# Patient Record
Sex: FEMALE | Race: WHITE | Employment: UNEMPLOYED | URBAN - METROPOLITAN AREA
[De-identification: names, ages, dates, MRNs, and addresses within clinical notes are randomized per-mention and may not be internally consistent; named-entity substitution may affect disease eponyms.]

---

## 2018-05-26 PROBLEM — K52.9 COLITIS: Status: ACTIVE | Noted: 2018-05-26

## 2019-09-09 ENCOUNTER — HOSPITAL ENCOUNTER (EMERGENCY)
Age: 84
Discharge: HOME | End: 2019-09-09
Attending: EMERGENCY MEDICINE
Payer: MEDICARE

## 2019-09-09 ENCOUNTER — APPOINTMENT (OUTPATIENT)
Dept: CT IMAGING | Age: 84
End: 2019-09-09
Payer: MEDICARE

## 2019-09-09 VITALS
OXYGEN SATURATION: 95 % | WEIGHT: 128 LBS | HEART RATE: 70 BPM | TEMPERATURE: 97.8 F | DIASTOLIC BLOOD PRESSURE: 62 MMHG | HEIGHT: 60 IN | RESPIRATION RATE: 16 BRPM | BODY MASS INDEX: 25.13 KG/M2 | SYSTOLIC BLOOD PRESSURE: 145 MMHG

## 2019-09-09 DIAGNOSIS — R51.9 NONINTRACTABLE HEADACHE, UNSPECIFIED CHRONICITY PATTERN, UNSPECIFIED HEADACHE TYPE: Primary | ICD-10-CM

## 2019-09-09 DIAGNOSIS — N18.9 CHRONIC RENAL IMPAIRMENT, UNSPECIFIED CKD STAGE: ICD-10-CM

## 2019-09-09 LAB
A/G RATIO: 1.2 (ref 1.1–2.2)
ALBUMIN SERPL-MCNC: 3.8 G/DL (ref 3.4–5)
ALP BLD-CCNC: 76 U/L (ref 40–129)
ALT SERPL-CCNC: 22 U/L (ref 10–40)
ANION GAP SERPL CALCULATED.3IONS-SCNC: 12 MMOL/L (ref 3–16)
AST SERPL-CCNC: 24 U/L (ref 15–37)
BASOPHILS ABSOLUTE: 0.1 K/UL (ref 0–0.2)
BASOPHILS RELATIVE PERCENT: 0.6 %
BILIRUB SERPL-MCNC: 0.3 MG/DL (ref 0–1)
BILIRUBIN URINE: NEGATIVE
BLOOD, URINE: NEGATIVE
BUN BLDV-MCNC: 42 MG/DL (ref 7–20)
CALCIUM SERPL-MCNC: 8.6 MG/DL (ref 8.3–10.6)
CHLORIDE BLD-SCNC: 94 MMOL/L (ref 99–110)
CLARITY: CLEAR
CO2: 22 MMOL/L (ref 21–32)
COLOR: YELLOW
CREAT SERPL-MCNC: 1.4 MG/DL (ref 0.6–1.2)
EOSINOPHILS ABSOLUTE: 0.1 K/UL (ref 0–0.6)
EOSINOPHILS RELATIVE PERCENT: 0.6 %
EPITHELIAL CELLS, UA: NORMAL /HPF
GFR AFRICAN AMERICAN: 42
GFR NON-AFRICAN AMERICAN: 35
GLOBULIN: 3.2 G/DL
GLUCOSE BLD-MCNC: 122 MG/DL (ref 70–99)
GLUCOSE URINE: NEGATIVE MG/DL
HCT VFR BLD CALC: 35.5 % (ref 36–48)
HEMOGLOBIN: 12.1 G/DL (ref 12–16)
INR BLD: 0.99 (ref 0.86–1.14)
KETONES, URINE: NEGATIVE MG/DL
LEUKOCYTE ESTERASE, URINE: ABNORMAL
LYMPHOCYTES ABSOLUTE: 1.7 K/UL (ref 1–5.1)
LYMPHOCYTES RELATIVE PERCENT: 17.3 %
MCH RBC QN AUTO: 32 PG (ref 26–34)
MCHC RBC AUTO-ENTMCNC: 34 G/DL (ref 31–36)
MCV RBC AUTO: 94.2 FL (ref 80–100)
MICROSCOPIC EXAMINATION: YES
MONOCYTES ABSOLUTE: 0.4 K/UL (ref 0–1.3)
MONOCYTES RELATIVE PERCENT: 4.5 %
NEUTROPHILS ABSOLUTE: 7.4 K/UL (ref 1.7–7.7)
NEUTROPHILS RELATIVE PERCENT: 77 %
NITRITE, URINE: NEGATIVE
PDW BLD-RTO: 12.7 % (ref 12.4–15.4)
PH UA: 5.5 (ref 5–8)
PLATELET # BLD: 169 K/UL (ref 135–450)
PMV BLD AUTO: 8 FL (ref 5–10.5)
POTASSIUM SERPL-SCNC: 4.9 MMOL/L (ref 3.5–5.1)
PROTEIN UA: NEGATIVE MG/DL
PROTHROMBIN TIME: 11.3 SEC (ref 9.8–13)
RBC # BLD: 3.77 M/UL (ref 4–5.2)
RBC UA: NORMAL /HPF (ref 0–2)
SODIUM BLD-SCNC: 128 MMOL/L (ref 136–145)
SPECIFIC GRAVITY UA: 1.01 (ref 1–1.03)
TOTAL PROTEIN: 7 G/DL (ref 6.4–8.2)
URINE TYPE: ABNORMAL
UROBILINOGEN, URINE: 0.2 E.U./DL
WBC # BLD: 9.7 K/UL (ref 4–11)
WBC UA: NORMAL /HPF (ref 0–5)

## 2019-09-09 PROCEDURE — 81001 URINALYSIS AUTO W/SCOPE: CPT

## 2019-09-09 PROCEDURE — 6360000002 HC RX W HCPCS: Performed by: EMERGENCY MEDICINE

## 2019-09-09 PROCEDURE — 99284 EMERGENCY DEPT VISIT MOD MDM: CPT

## 2019-09-09 PROCEDURE — 96374 THER/PROPH/DIAG INJ IV PUSH: CPT

## 2019-09-09 PROCEDURE — 96361 HYDRATE IV INFUSION ADD-ON: CPT

## 2019-09-09 PROCEDURE — 80053 COMPREHEN METABOLIC PANEL: CPT

## 2019-09-09 PROCEDURE — 2580000003 HC RX 258: Performed by: EMERGENCY MEDICINE

## 2019-09-09 PROCEDURE — 96375 TX/PRO/DX INJ NEW DRUG ADDON: CPT

## 2019-09-09 PROCEDURE — 70450 CT HEAD/BRAIN W/O DYE: CPT

## 2019-09-09 PROCEDURE — 85025 COMPLETE CBC W/AUTO DIFF WBC: CPT

## 2019-09-09 PROCEDURE — 85610 PROTHROMBIN TIME: CPT

## 2019-09-09 RX ORDER — METOCLOPRAMIDE HYDROCHLORIDE 5 MG/ML
5 INJECTION INTRAMUSCULAR; INTRAVENOUS ONCE
Status: COMPLETED | OUTPATIENT
Start: 2019-09-09 | End: 2019-09-09

## 2019-09-09 RX ORDER — 0.9 % SODIUM CHLORIDE 0.9 %
1000 INTRAVENOUS SOLUTION INTRAVENOUS ONCE
Status: COMPLETED | OUTPATIENT
Start: 2019-09-09 | End: 2019-09-09

## 2019-09-09 RX ORDER — BROMFENAC SODIUM 0.7 MG/ML
SOLUTION/ DROPS OPHTHALMIC
Refills: 4 | COMMUNITY
Start: 2019-08-30 | End: 2019-10-30

## 2019-09-09 RX ADMIN — HYDROMORPHONE HYDROCHLORIDE 0.5 MG: 1 INJECTION, SOLUTION INTRAMUSCULAR; INTRAVENOUS; SUBCUTANEOUS at 15:04

## 2019-09-09 RX ADMIN — SODIUM CHLORIDE 1000 ML: 9 INJECTION, SOLUTION INTRAVENOUS at 15:04

## 2019-09-09 RX ADMIN — METOCLOPRAMIDE 5 MG: 5 INJECTION, SOLUTION INTRAMUSCULAR; INTRAVENOUS at 15:04

## 2019-09-09 ASSESSMENT — PAIN DESCRIPTION - PAIN TYPE
TYPE: ACUTE PAIN
TYPE: ACUTE PAIN

## 2019-09-09 ASSESSMENT — PAIN DESCRIPTION - DESCRIPTORS: DESCRIPTORS: PRESSURE

## 2019-09-09 ASSESSMENT — PAIN DESCRIPTION - FREQUENCY: FREQUENCY: CONTINUOUS

## 2019-09-09 ASSESSMENT — PAIN SCALES - GENERAL
PAINLEVEL_OUTOF10: 1
PAINLEVEL_OUTOF10: 10

## 2019-09-09 ASSESSMENT — PAIN DESCRIPTION - LOCATION
LOCATION: HEAD
LOCATION: HEAD

## 2019-09-09 NOTE — ED PROVIDER NOTES
GASTROINTESTINAL ENDOSCOPY  02/11/1998    chronic gastritis    UPPER GASTROINTESTINAL ENDOSCOPY  09/10/1999    chronic gastritis    UPPER GASTROINTESTINAL ENDOSCOPY  07/17/2001    chronic gastritis w intestinal metaplasia    UPPER GASTROINTESTINAL ENDOSCOPY  02/03/2004    chronic gastritis w intestinal metaplasia    UPPER GASTROINTESTINAL ENDOSCOPY  11/29/2007    gastritis     History reviewed. No pertinent family history. Social History     Socioeconomic History    Marital status:      Spouse name: Not on file    Number of children: Not on file    Years of education: Not on file    Highest education level: Not on file   Occupational History    Not on file   Social Needs    Financial resource strain: Not on file    Food insecurity:     Worry: Not on file     Inability: Not on file    Transportation needs:     Medical: Not on file     Non-medical: Not on file   Tobacco Use    Smoking status: Never Smoker    Smokeless tobacco: Never Used   Substance and Sexual Activity    Alcohol use: No    Drug use: No    Sexual activity: Never   Lifestyle    Physical activity:     Days per week: Not on file     Minutes per session: Not on file    Stress: Not on file   Relationships    Social connections:     Talks on phone: Not on file     Gets together: Not on file     Attends [employer]ical service: Not on file     Active member of club or organization: Not on file     Attends meetings of clubs or organizations: Not on file     Relationship status: Not on file    Intimate partner violence:     Fear of current or ex partner: Not on file     Emotionally abused: Not on file     Physically abused: Not on file     Forced sexual activity: Not on file   Other Topics Concern    Not on file   Social History Narrative    Not on file     No current facility-administered medications for this encounter.       Current Outpatient Medications   Medication Sig Dispense Refill    atenolol (TENORMIN) 25 MG tablet Take 1 cerebral atrophy with evidence of chronic periventricular small vessel ischemic disease. ED COURSE/MDM  Patient seen and evaluated. Old records reviewed. Labs and imaging reviewed and results discussed with patient. This is a 20-year-old female presenting to the ER with a headache. She says she has had a headache now for 3 weeks. I believe the likelihood of meningitis would be extremely low with 3 weeks of a headache. There is no evidence of intracranial hemorrhage on head CT. She has been seen by ENT and her primary doctor. She did obtain significant relief here in the ER with medications and is feeling better and wants to go home. CLINICAL IMPRESSION  1. Nonintractable headache, unspecified chronicity pattern, unspecified headache type    2. Chronic renal impairment, unspecified CKD stage        Blood pressure (!) 145/62, pulse 70, temperature 97.8 °F (36.6 °C), temperature source Oral, resp. rate 16, height 5' (1.524 m), weight 128 lb (58.1 kg), SpO2 95 %. DISPOSITION  Rosalina Dawn was discharged to home in stable condition.          Penny Coburn DO  09/09/19 7032

## 2019-10-30 ENCOUNTER — APPOINTMENT (OUTPATIENT)
Dept: GENERAL RADIOLOGY | Age: 84
DRG: 193 | End: 2019-10-30
Payer: MEDICARE

## 2019-10-30 ENCOUNTER — HOSPITAL ENCOUNTER (INPATIENT)
Age: 84
LOS: 7 days | Discharge: HOME | DRG: 193 | End: 2019-11-06
Attending: EMERGENCY MEDICINE | Admitting: INTERNAL MEDICINE
Payer: MEDICARE

## 2019-10-30 DIAGNOSIS — N18.9 CHRONIC KIDNEY DISEASE, UNSPECIFIED CKD STAGE: ICD-10-CM

## 2019-10-30 DIAGNOSIS — R09.02 HYPOXIA: ICD-10-CM

## 2019-10-30 DIAGNOSIS — J18.9 COMMUNITY ACQUIRED PNEUMONIA OF LEFT LOWER LOBE OF LUNG: Primary | ICD-10-CM

## 2019-10-30 PROBLEM — J96.01 ACUTE RESPIRATORY FAILURE WITH HYPOXIA (HCC): Status: ACTIVE | Noted: 2019-10-30

## 2019-10-30 LAB
A/G RATIO: 1.3 (ref 1.1–2.2)
ALBUMIN SERPL-MCNC: 4.2 G/DL (ref 3.4–5)
ALP BLD-CCNC: 87 U/L (ref 40–129)
ALT SERPL-CCNC: 24 U/L (ref 10–40)
ANION GAP SERPL CALCULATED.3IONS-SCNC: 15 MMOL/L (ref 3–16)
AST SERPL-CCNC: 38 U/L (ref 15–37)
BASE EXCESS VENOUS: -1.3 MMOL/L (ref -3–3)
BASOPHILS ABSOLUTE: 0.1 K/UL (ref 0–0.2)
BASOPHILS RELATIVE PERCENT: 0.5 %
BILIRUB SERPL-MCNC: 0.5 MG/DL (ref 0–1)
BUN BLDV-MCNC: 43 MG/DL (ref 7–20)
CALCIUM SERPL-MCNC: 9.1 MG/DL (ref 8.3–10.6)
CARBOXYHEMOGLOBIN: 1.3 % (ref 0–1.5)
CHLORIDE BLD-SCNC: 100 MMOL/L (ref 99–110)
CO2: 23 MMOL/L (ref 21–32)
CREAT SERPL-MCNC: 1.4 MG/DL (ref 0.6–1.2)
EOSINOPHILS ABSOLUTE: 0 K/UL (ref 0–0.6)
EOSINOPHILS RELATIVE PERCENT: 0 %
GFR AFRICAN AMERICAN: 42
GFR NON-AFRICAN AMERICAN: 35
GLOBULIN: 3.3 G/DL
GLUCOSE BLD-MCNC: 131 MG/DL (ref 70–99)
HCO3 VENOUS: 22.8 MMOL/L (ref 23–29)
HCT VFR BLD CALC: 34.5 % (ref 36–48)
HEMOGLOBIN: 11.8 G/DL (ref 12–16)
LACTIC ACID: 1.4 MMOL/L (ref 0.4–2)
LIPASE: 15 U/L (ref 13–60)
LYMPHOCYTES ABSOLUTE: 1 K/UL (ref 1–5.1)
LYMPHOCYTES RELATIVE PERCENT: 8.6 %
MCH RBC QN AUTO: 32.1 PG (ref 26–34)
MCHC RBC AUTO-ENTMCNC: 34.1 G/DL (ref 31–36)
MCV RBC AUTO: 94 FL (ref 80–100)
METHEMOGLOBIN VENOUS: 0.4 %
MONOCYTES ABSOLUTE: 0.6 K/UL (ref 0–1.3)
MONOCYTES RELATIVE PERCENT: 5.2 %
NEUTROPHILS ABSOLUTE: 9.9 K/UL (ref 1.7–7.7)
NEUTROPHILS RELATIVE PERCENT: 85.7 %
O2 CONTENT, VEN: 17 VOL %
O2 SAT, VEN: 97 %
O2 THERAPY: ABNORMAL
PCO2, VEN: 36 MMHG (ref 40–50)
PDW BLD-RTO: 13.1 % (ref 12.4–15.4)
PH VENOUS: 7.42 (ref 7.35–7.45)
PLATELET # BLD: 174 K/UL (ref 135–450)
PMV BLD AUTO: 8.6 FL (ref 5–10.5)
PO2, VEN: 102.4 MMHG (ref 25–40)
POTASSIUM SERPL-SCNC: 4.7 MMOL/L (ref 3.5–5.1)
PRO-BNP: 362 PG/ML (ref 0–449)
RAPID INFLUENZA  B AGN: NEGATIVE
RAPID INFLUENZA A AGN: NEGATIVE
RBC # BLD: 3.67 M/UL (ref 4–5.2)
SODIUM BLD-SCNC: 138 MMOL/L (ref 136–145)
TCO2 CALC VENOUS: 24 MMOL/L
TOTAL PROTEIN: 7.5 G/DL (ref 6.4–8.2)
TROPONIN: <0.01 NG/ML
WBC # BLD: 11.5 K/UL (ref 4–11)

## 2019-10-30 PROCEDURE — 85025 COMPLETE CBC W/AUTO DIFF WBC: CPT

## 2019-10-30 PROCEDURE — 87040 BLOOD CULTURE FOR BACTERIA: CPT

## 2019-10-30 PROCEDURE — 83880 ASSAY OF NATRIURETIC PEPTIDE: CPT

## 2019-10-30 PROCEDURE — 83605 ASSAY OF LACTIC ACID: CPT

## 2019-10-30 PROCEDURE — 6370000000 HC RX 637 (ALT 250 FOR IP): Performed by: PHYSICIAN ASSISTANT

## 2019-10-30 PROCEDURE — 6360000002 HC RX W HCPCS: Performed by: PHYSICIAN ASSISTANT

## 2019-10-30 PROCEDURE — 87804 INFLUENZA ASSAY W/OPTIC: CPT

## 2019-10-30 PROCEDURE — 94640 AIRWAY INHALATION TREATMENT: CPT

## 2019-10-30 PROCEDURE — 99284 EMERGENCY DEPT VISIT MOD MDM: CPT

## 2019-10-30 PROCEDURE — 1200000000 HC SEMI PRIVATE

## 2019-10-30 PROCEDURE — 93005 ELECTROCARDIOGRAM TRACING: CPT | Performed by: PHYSICIAN ASSISTANT

## 2019-10-30 PROCEDURE — 94150 VITAL CAPACITY TEST: CPT

## 2019-10-30 PROCEDURE — 6360000002 HC RX W HCPCS: Performed by: INTERNAL MEDICINE

## 2019-10-30 PROCEDURE — 84484 ASSAY OF TROPONIN QUANT: CPT

## 2019-10-30 PROCEDURE — 96365 THER/PROPH/DIAG IV INF INIT: CPT

## 2019-10-30 PROCEDURE — 2700000000 HC OXYGEN THERAPY PER DAY

## 2019-10-30 PROCEDURE — 6370000000 HC RX 637 (ALT 250 FOR IP): Performed by: INTERNAL MEDICINE

## 2019-10-30 PROCEDURE — 2580000003 HC RX 258: Performed by: INTERNAL MEDICINE

## 2019-10-30 PROCEDURE — 94761 N-INVAS EAR/PLS OXIMETRY MLT: CPT

## 2019-10-30 PROCEDURE — 83690 ASSAY OF LIPASE: CPT

## 2019-10-30 PROCEDURE — 71046 X-RAY EXAM CHEST 2 VIEWS: CPT

## 2019-10-30 PROCEDURE — 80053 COMPREHEN METABOLIC PANEL: CPT

## 2019-10-30 PROCEDURE — 2580000003 HC RX 258: Performed by: PHYSICIAN ASSISTANT

## 2019-10-30 PROCEDURE — 82803 BLOOD GASES ANY COMBINATION: CPT

## 2019-10-30 RX ORDER — MECLIZINE HCL 12.5 MG/1
25 TABLET ORAL 3 TIMES DAILY PRN
Status: DISCONTINUED | OUTPATIENT
Start: 2019-10-30 | End: 2019-11-06 | Stop reason: HOSPADM

## 2019-10-30 RX ORDER — ONDANSETRON 2 MG/ML
4 INJECTION INTRAMUSCULAR; INTRAVENOUS EVERY 6 HOURS PRN
Status: DISCONTINUED | OUTPATIENT
Start: 2019-10-30 | End: 2019-11-06 | Stop reason: HOSPADM

## 2019-10-30 RX ORDER — AMLODIPINE BESYLATE 5 MG/1
10 TABLET ORAL DAILY
Status: DISCONTINUED | OUTPATIENT
Start: 2019-10-30 | End: 2019-11-06 | Stop reason: HOSPADM

## 2019-10-30 RX ORDER — LEVOTHYROXINE SODIUM 0.1 MG/1
100 TABLET ORAL DAILY
Status: DISCONTINUED | OUTPATIENT
Start: 2019-10-30 | End: 2019-11-06 | Stop reason: HOSPADM

## 2019-10-30 RX ORDER — SIMVASTATIN 10 MG
20 TABLET ORAL NIGHTLY
Status: DISCONTINUED | OUTPATIENT
Start: 2019-10-30 | End: 2019-11-06 | Stop reason: HOSPADM

## 2019-10-30 RX ORDER — ASPIRIN 81 MG/1
81 TABLET ORAL DAILY
Status: DISCONTINUED | OUTPATIENT
Start: 2019-10-30 | End: 2019-11-06 | Stop reason: HOSPADM

## 2019-10-30 RX ORDER — SODIUM CHLORIDE 0.9 % (FLUSH) 0.9 %
10 SYRINGE (ML) INJECTION EVERY 12 HOURS SCHEDULED
Status: DISCONTINUED | OUTPATIENT
Start: 2019-10-30 | End: 2019-11-05

## 2019-10-30 RX ORDER — IPRATROPIUM BROMIDE AND ALBUTEROL SULFATE 2.5; .5 MG/3ML; MG/3ML
1 SOLUTION RESPIRATORY (INHALATION) ONCE
Status: COMPLETED | OUTPATIENT
Start: 2019-10-30 | End: 2019-10-30

## 2019-10-30 RX ORDER — ATENOLOL 25 MG/1
25 TABLET ORAL DAILY
Status: DISCONTINUED | OUTPATIENT
Start: 2019-10-30 | End: 2019-11-06 | Stop reason: HOSPADM

## 2019-10-30 RX ORDER — FLUTICASONE PROPIONATE 50 MCG
1 SPRAY, SUSPENSION (ML) NASAL DAILY
Status: DISCONTINUED | OUTPATIENT
Start: 2019-10-30 | End: 2019-11-06 | Stop reason: HOSPADM

## 2019-10-30 RX ORDER — ALBUTEROL SULFATE 2.5 MG/3ML
2.5 SOLUTION RESPIRATORY (INHALATION) EVERY 4 HOURS PRN
Status: DISCONTINUED | OUTPATIENT
Start: 2019-10-30 | End: 2019-11-01

## 2019-10-30 RX ORDER — ALBUTEROL SULFATE 2.5 MG/3ML
2.5 SOLUTION RESPIRATORY (INHALATION)
Status: DISCONTINUED | OUTPATIENT
Start: 2019-10-30 | End: 2019-10-30

## 2019-10-30 RX ORDER — PANTOPRAZOLE SODIUM 40 MG/1
40 TABLET, DELAYED RELEASE ORAL
Status: DISCONTINUED | OUTPATIENT
Start: 2019-10-31 | End: 2019-11-06 | Stop reason: HOSPADM

## 2019-10-30 RX ORDER — ACETAMINOPHEN 325 MG/1
650 TABLET ORAL ONCE
Status: COMPLETED | OUTPATIENT
Start: 2019-10-30 | End: 2019-10-30

## 2019-10-30 RX ORDER — SODIUM CHLORIDE 0.9 % (FLUSH) 0.9 %
10 SYRINGE (ML) INJECTION PRN
Status: DISCONTINUED | OUTPATIENT
Start: 2019-10-30 | End: 2019-11-06 | Stop reason: HOSPADM

## 2019-10-30 RX ORDER — 0.9 % SODIUM CHLORIDE 0.9 %
1000 INTRAVENOUS SOLUTION INTRAVENOUS ONCE
Status: COMPLETED | OUTPATIENT
Start: 2019-10-30 | End: 2019-10-30

## 2019-10-30 RX ADMIN — CEFTRIAXONE SODIUM 1 G: 1 INJECTION, POWDER, FOR SOLUTION INTRAMUSCULAR; INTRAVENOUS at 14:20

## 2019-10-30 RX ADMIN — SIMVASTATIN 20 MG: 10 TABLET, FILM COATED ORAL at 20:52

## 2019-10-30 RX ADMIN — SODIUM CHLORIDE 1000 ML: 9 INJECTION, SOLUTION INTRAVENOUS at 12:06

## 2019-10-30 RX ADMIN — ATENOLOL 25 MG: 25 TABLET ORAL at 16:17

## 2019-10-30 RX ADMIN — ACETAMINOPHEN 650 MG: 325 TABLET ORAL at 11:35

## 2019-10-30 RX ADMIN — AMLODIPINE BESYLATE 10 MG: 5 TABLET ORAL at 16:17

## 2019-10-30 RX ADMIN — ASPIRIN 81 MG: 81 TABLET ORAL at 16:17

## 2019-10-30 RX ADMIN — Medication 10 ML: at 20:53

## 2019-10-30 RX ADMIN — AZITHROMYCIN MONOHYDRATE 500 MG: 500 INJECTION, POWDER, LYOPHILIZED, FOR SOLUTION INTRAVENOUS at 16:39

## 2019-10-30 RX ADMIN — IPRATROPIUM BROMIDE AND ALBUTEROL SULFATE 1 AMPULE: .5; 3 SOLUTION RESPIRATORY (INHALATION) at 11:18

## 2019-10-30 RX ADMIN — VANCOMYCIN HYDROCHLORIDE 1000 MG: 1 INJECTION, POWDER, LYOPHILIZED, FOR SOLUTION INTRAVENOUS at 12:06

## 2019-10-30 ASSESSMENT — PAIN SCALES - GENERAL
PAINLEVEL_OUTOF10: 0

## 2019-10-31 LAB
ANION GAP SERPL CALCULATED.3IONS-SCNC: 12 MMOL/L (ref 3–16)
BASOPHILS ABSOLUTE: 0.1 K/UL (ref 0–0.2)
BASOPHILS RELATIVE PERCENT: 0.9 %
BILIRUBIN URINE: NEGATIVE
BLOOD, URINE: NEGATIVE
BUN BLDV-MCNC: 29 MG/DL (ref 7–20)
CALCIUM SERPL-MCNC: 8.8 MG/DL (ref 8.3–10.6)
CHLORIDE BLD-SCNC: 102 MMOL/L (ref 99–110)
CLARITY: CLEAR
CO2: 26 MMOL/L (ref 21–32)
COLOR: NORMAL
CREAT SERPL-MCNC: 1.3 MG/DL (ref 0.6–1.2)
EKG ATRIAL RATE: 78 BPM
EKG DIAGNOSIS: NORMAL
EKG P AXIS: 41 DEGREES
EKG P-R INTERVAL: 156 MS
EKG Q-T INTERVAL: 360 MS
EKG QRS DURATION: 74 MS
EKG QTC CALCULATION (BAZETT): 410 MS
EKG R AXIS: 17 DEGREES
EKG T AXIS: 62 DEGREES
EKG VENTRICULAR RATE: 78 BPM
EOSINOPHILS ABSOLUTE: 0.2 K/UL (ref 0–0.6)
EOSINOPHILS RELATIVE PERCENT: 2.8 %
GFR AFRICAN AMERICAN: 46
GFR NON-AFRICAN AMERICAN: 38
GLUCOSE BLD-MCNC: 100 MG/DL (ref 70–99)
GLUCOSE URINE: NEGATIVE MG/DL
HCT VFR BLD CALC: 35.3 % (ref 36–48)
HEMOGLOBIN: 12 G/DL (ref 12–16)
KETONES, URINE: NEGATIVE MG/DL
LEUKOCYTE ESTERASE, URINE: NEGATIVE
LYMPHOCYTES ABSOLUTE: 1.1 K/UL (ref 1–5.1)
LYMPHOCYTES RELATIVE PERCENT: 15.4 %
MCH RBC QN AUTO: 32.1 PG (ref 26–34)
MCHC RBC AUTO-ENTMCNC: 34.1 G/DL (ref 31–36)
MCV RBC AUTO: 94.1 FL (ref 80–100)
MICROSCOPIC EXAMINATION: NORMAL
MONOCYTES ABSOLUTE: 0.4 K/UL (ref 0–1.3)
MONOCYTES RELATIVE PERCENT: 6.3 %
NEUTROPHILS ABSOLUTE: 5.3 K/UL (ref 1.7–7.7)
NEUTROPHILS RELATIVE PERCENT: 74.6 %
NITRITE, URINE: NEGATIVE
PDW BLD-RTO: 13.4 % (ref 12.4–15.4)
PH UA: 6.5 (ref 5–8)
PLATELET # BLD: 191 K/UL (ref 135–450)
PMV BLD AUTO: 8.7 FL (ref 5–10.5)
POTASSIUM REFLEX MAGNESIUM: 4.2 MMOL/L (ref 3.5–5.1)
PROTEIN UA: NEGATIVE MG/DL
RBC # BLD: 3.75 M/UL (ref 4–5.2)
SODIUM BLD-SCNC: 140 MMOL/L (ref 136–145)
SPECIFIC GRAVITY UA: 1.01 (ref 1–1.03)
URINE TYPE: NORMAL
UROBILINOGEN, URINE: 0.2 E.U./DL
WBC # BLD: 7.1 K/UL (ref 4–11)

## 2019-10-31 PROCEDURE — 80048 BASIC METABOLIC PNL TOTAL CA: CPT

## 2019-10-31 PROCEDURE — 6360000002 HC RX W HCPCS: Performed by: INTERNAL MEDICINE

## 2019-10-31 PROCEDURE — 6370000000 HC RX 637 (ALT 250 FOR IP): Performed by: INTERNAL MEDICINE

## 2019-10-31 PROCEDURE — 97161 PT EVAL LOW COMPLEX 20 MIN: CPT

## 2019-10-31 PROCEDURE — 2580000003 HC RX 258: Performed by: INTERNAL MEDICINE

## 2019-10-31 PROCEDURE — 97166 OT EVAL MOD COMPLEX 45 MIN: CPT

## 2019-10-31 PROCEDURE — 81003 URINALYSIS AUTO W/O SCOPE: CPT

## 2019-10-31 PROCEDURE — 36415 COLL VENOUS BLD VENIPUNCTURE: CPT

## 2019-10-31 PROCEDURE — 97116 GAIT TRAINING THERAPY: CPT

## 2019-10-31 PROCEDURE — 85025 COMPLETE CBC W/AUTO DIFF WBC: CPT

## 2019-10-31 PROCEDURE — 1200000000 HC SEMI PRIVATE

## 2019-10-31 PROCEDURE — 97530 THERAPEUTIC ACTIVITIES: CPT

## 2019-10-31 PROCEDURE — 93010 ELECTROCARDIOGRAM REPORT: CPT | Performed by: INTERNAL MEDICINE

## 2019-10-31 PROCEDURE — 97535 SELF CARE MNGMENT TRAINING: CPT

## 2019-10-31 RX ORDER — GUAIFENESIN/DEXTROMETHORPHAN 100-10MG/5
5 SYRUP ORAL EVERY 4 HOURS PRN
Status: DISCONTINUED | OUTPATIENT
Start: 2019-10-31 | End: 2019-11-06 | Stop reason: HOSPADM

## 2019-10-31 RX ADMIN — GUAIFENESIN AND DEXTROMETHORPHAN 5 ML: 100; 10 SYRUP ORAL at 15:45

## 2019-10-31 RX ADMIN — PANTOPRAZOLE SODIUM 40 MG: 40 TABLET, DELAYED RELEASE ORAL at 06:11

## 2019-10-31 RX ADMIN — SIMVASTATIN 20 MG: 10 TABLET, FILM COATED ORAL at 21:08

## 2019-10-31 RX ADMIN — ATENOLOL 25 MG: 25 TABLET ORAL at 08:56

## 2019-10-31 RX ADMIN — ENOXAPARIN SODIUM 40 MG: 40 INJECTION SUBCUTANEOUS at 08:56

## 2019-10-31 RX ADMIN — CEFTRIAXONE SODIUM 1 G: 1 INJECTION, POWDER, FOR SOLUTION INTRAMUSCULAR; INTRAVENOUS at 14:43

## 2019-10-31 RX ADMIN — Medication 10 ML: at 08:57

## 2019-10-31 RX ADMIN — AMLODIPINE BESYLATE 10 MG: 5 TABLET ORAL at 08:56

## 2019-10-31 RX ADMIN — AZITHROMYCIN MONOHYDRATE 500 MG: 500 INJECTION, POWDER, LYOPHILIZED, FOR SOLUTION INTRAVENOUS at 15:45

## 2019-10-31 RX ADMIN — ASPIRIN 81 MG: 81 TABLET ORAL at 08:57

## 2019-10-31 RX ADMIN — LEVOTHYROXINE SODIUM 100 MCG: 100 TABLET ORAL at 06:11

## 2019-10-31 RX ADMIN — Medication 10 ML: at 21:11

## 2019-10-31 ASSESSMENT — PAIN SCALES - GENERAL
PAINLEVEL_OUTOF10: 0

## 2019-11-01 PROCEDURE — 6360000002 HC RX W HCPCS: Performed by: INTERNAL MEDICINE

## 2019-11-01 PROCEDURE — 94640 AIRWAY INHALATION TREATMENT: CPT

## 2019-11-01 PROCEDURE — 1200000000 HC SEMI PRIVATE

## 2019-11-01 PROCEDURE — 6370000000 HC RX 637 (ALT 250 FOR IP): Performed by: INTERNAL MEDICINE

## 2019-11-01 PROCEDURE — 2580000003 HC RX 258: Performed by: INTERNAL MEDICINE

## 2019-11-01 RX ORDER — ALBUTEROL SULFATE 2.5 MG/3ML
2.5 SOLUTION RESPIRATORY (INHALATION) EVERY 4 HOURS PRN
Status: DISCONTINUED | OUTPATIENT
Start: 2019-11-01 | End: 2019-11-05

## 2019-11-01 RX ORDER — ALBUTEROL SULFATE 2.5 MG/3ML
2.5 SOLUTION RESPIRATORY (INHALATION) 4 TIMES DAILY
Status: DISCONTINUED | OUTPATIENT
Start: 2019-11-01 | End: 2019-11-01

## 2019-11-01 RX ADMIN — ASPIRIN 81 MG: 81 TABLET ORAL at 08:42

## 2019-11-01 RX ADMIN — SIMVASTATIN 20 MG: 10 TABLET, FILM COATED ORAL at 22:17

## 2019-11-01 RX ADMIN — Medication 10 ML: at 22:17

## 2019-11-01 RX ADMIN — PANTOPRAZOLE SODIUM 40 MG: 40 TABLET, DELAYED RELEASE ORAL at 06:12

## 2019-11-01 RX ADMIN — GUAIFENESIN AND DEXTROMETHORPHAN 5 ML: 100; 10 SYRUP ORAL at 08:42

## 2019-11-01 RX ADMIN — GUAIFENESIN AND DEXTROMETHORPHAN 5 ML: 100; 10 SYRUP ORAL at 14:03

## 2019-11-01 RX ADMIN — AMLODIPINE BESYLATE 10 MG: 5 TABLET ORAL at 08:42

## 2019-11-01 RX ADMIN — ATENOLOL 25 MG: 25 TABLET ORAL at 08:42

## 2019-11-01 RX ADMIN — ALBUTEROL SULFATE 2.5 MG: 2.5 SOLUTION RESPIRATORY (INHALATION) at 14:46

## 2019-11-01 RX ADMIN — FLUTICASONE PROPIONATE 1 SPRAY: 50 SPRAY, METERED NASAL at 09:49

## 2019-11-01 RX ADMIN — Medication 10 ML: at 08:42

## 2019-11-01 RX ADMIN — ENOXAPARIN SODIUM 30 MG: 30 INJECTION SUBCUTANEOUS at 08:42

## 2019-11-01 RX ADMIN — LEVOTHYROXINE SODIUM 100 MCG: 100 TABLET ORAL at 06:12

## 2019-11-01 RX ADMIN — AZITHROMYCIN MONOHYDRATE 500 MG: 500 INJECTION, POWDER, LYOPHILIZED, FOR SOLUTION INTRAVENOUS at 15:31

## 2019-11-01 RX ADMIN — GUAIFENESIN AND DEXTROMETHORPHAN 5 ML: 100; 10 SYRUP ORAL at 22:17

## 2019-11-01 RX ADMIN — CEFTRIAXONE SODIUM 1 G: 1 INJECTION, POWDER, FOR SOLUTION INTRAMUSCULAR; INTRAVENOUS at 13:56

## 2019-11-01 ASSESSMENT — PAIN SCALES - GENERAL
PAINLEVEL_OUTOF10: 0

## 2019-11-02 ENCOUNTER — APPOINTMENT (OUTPATIENT)
Dept: CT IMAGING | Age: 84
DRG: 193 | End: 2019-11-02
Payer: MEDICARE

## 2019-11-02 PROCEDURE — 99223 1ST HOSP IP/OBS HIGH 75: CPT | Performed by: INTERNAL MEDICINE

## 2019-11-02 PROCEDURE — 6370000000 HC RX 637 (ALT 250 FOR IP): Performed by: INTERNAL MEDICINE

## 2019-11-02 PROCEDURE — 71250 CT THORAX DX C-: CPT

## 2019-11-02 PROCEDURE — 94640 AIRWAY INHALATION TREATMENT: CPT

## 2019-11-02 PROCEDURE — 2580000003 HC RX 258: Performed by: INTERNAL MEDICINE

## 2019-11-02 PROCEDURE — 97116 GAIT TRAINING THERAPY: CPT

## 2019-11-02 PROCEDURE — 1200000000 HC SEMI PRIVATE

## 2019-11-02 PROCEDURE — 97162 PT EVAL MOD COMPLEX 30 MIN: CPT

## 2019-11-02 PROCEDURE — 6360000002 HC RX W HCPCS: Performed by: INTERNAL MEDICINE

## 2019-11-02 RX ORDER — IPRATROPIUM BROMIDE AND ALBUTEROL SULFATE 2.5; .5 MG/3ML; MG/3ML
1 SOLUTION RESPIRATORY (INHALATION) 4 TIMES DAILY
Status: DISCONTINUED | OUTPATIENT
Start: 2019-11-02 | End: 2019-11-05

## 2019-11-02 RX ADMIN — IPRATROPIUM BROMIDE AND ALBUTEROL SULFATE 1 AMPULE: .5; 3 SOLUTION RESPIRATORY (INHALATION) at 19:13

## 2019-11-02 RX ADMIN — ATENOLOL 25 MG: 25 TABLET ORAL at 10:05

## 2019-11-02 RX ADMIN — AZITHROMYCIN MONOHYDRATE 500 MG: 500 INJECTION, POWDER, LYOPHILIZED, FOR SOLUTION INTRAVENOUS at 16:11

## 2019-11-02 RX ADMIN — GUAIFENESIN AND DEXTROMETHORPHAN 5 ML: 100; 10 SYRUP ORAL at 11:16

## 2019-11-02 RX ADMIN — ENOXAPARIN SODIUM 30 MG: 30 INJECTION SUBCUTANEOUS at 10:05

## 2019-11-02 RX ADMIN — GUAIFENESIN AND DEXTROMETHORPHAN 5 ML: 100; 10 SYRUP ORAL at 21:27

## 2019-11-02 RX ADMIN — PANTOPRAZOLE SODIUM 40 MG: 40 TABLET, DELAYED RELEASE ORAL at 06:07

## 2019-11-02 RX ADMIN — CEFTRIAXONE SODIUM 1 G: 1 INJECTION, POWDER, FOR SOLUTION INTRAMUSCULAR; INTRAVENOUS at 13:37

## 2019-11-02 RX ADMIN — ASPIRIN 81 MG: 81 TABLET ORAL at 10:05

## 2019-11-02 RX ADMIN — FLUTICASONE PROPIONATE 1 SPRAY: 50 SPRAY, METERED NASAL at 10:05

## 2019-11-02 RX ADMIN — AMLODIPINE BESYLATE 10 MG: 5 TABLET ORAL at 10:05

## 2019-11-02 RX ADMIN — SIMVASTATIN 20 MG: 10 TABLET, FILM COATED ORAL at 21:27

## 2019-11-02 RX ADMIN — LEVOTHYROXINE SODIUM 100 MCG: 100 TABLET ORAL at 06:07

## 2019-11-02 RX ADMIN — GUAIFENESIN AND DEXTROMETHORPHAN 5 ML: 100; 10 SYRUP ORAL at 17:18

## 2019-11-02 RX ADMIN — Medication 10 ML: at 21:27

## 2019-11-02 RX ADMIN — Medication 10 ML: at 10:05

## 2019-11-02 ASSESSMENT — PAIN SCALES - GENERAL
PAINLEVEL_OUTOF10: 0

## 2019-11-02 ASSESSMENT — ENCOUNTER SYMPTOMS
EYE ITCHING: 0
DIARRHEA: 0
EYE PAIN: 0
CHOKING: 0
CONSTIPATION: 0
ABDOMINAL PAIN: 0
COUGH: 1
EYE DISCHARGE: 0
STRIDOR: 0
VOICE CHANGE: 0
SORE THROAT: 0
CHEST TIGHTNESS: 0

## 2019-11-03 LAB
ANION GAP SERPL CALCULATED.3IONS-SCNC: 13 MMOL/L (ref 3–16)
BASOPHILS ABSOLUTE: 0 K/UL (ref 0–0.2)
BASOPHILS RELATIVE PERCENT: 0.8 %
BUN BLDV-MCNC: 19 MG/DL (ref 7–20)
CALCIUM SERPL-MCNC: 8.1 MG/DL (ref 8.3–10.6)
CHLORIDE BLD-SCNC: 101 MMOL/L (ref 99–110)
CO2: 22 MMOL/L (ref 21–32)
CREAT SERPL-MCNC: 1.2 MG/DL (ref 0.6–1.2)
EOSINOPHILS ABSOLUTE: 0.1 K/UL (ref 0–0.6)
EOSINOPHILS RELATIVE PERCENT: 2.3 %
GFR AFRICAN AMERICAN: 51
GFR NON-AFRICAN AMERICAN: 42
GLUCOSE BLD-MCNC: 133 MG/DL (ref 70–99)
GLUCOSE BLD-MCNC: 216 MG/DL (ref 70–99)
HCT VFR BLD CALC: 32.8 % (ref 36–48)
HEMOGLOBIN: 11.2 G/DL (ref 12–16)
LYMPHOCYTES ABSOLUTE: 0.9 K/UL (ref 1–5.1)
LYMPHOCYTES RELATIVE PERCENT: 15 %
MCH RBC QN AUTO: 32.3 PG (ref 26–34)
MCHC RBC AUTO-ENTMCNC: 34.1 G/DL (ref 31–36)
MCV RBC AUTO: 94.7 FL (ref 80–100)
MONOCYTES ABSOLUTE: 0.6 K/UL (ref 0–1.3)
MONOCYTES RELATIVE PERCENT: 10.2 %
NEUTROPHILS ABSOLUTE: 4.4 K/UL (ref 1.7–7.7)
NEUTROPHILS RELATIVE PERCENT: 71.7 %
PDW BLD-RTO: 12.9 % (ref 12.4–15.4)
PERFORMED ON: ABNORMAL
PLATELET # BLD: 183 K/UL (ref 135–450)
PMV BLD AUTO: 8.3 FL (ref 5–10.5)
POTASSIUM SERPL-SCNC: 3.7 MMOL/L (ref 3.5–5.1)
RBC # BLD: 3.46 M/UL (ref 4–5.2)
SODIUM BLD-SCNC: 136 MMOL/L (ref 136–145)
WBC # BLD: 6.1 K/UL (ref 4–11)

## 2019-11-03 PROCEDURE — 94640 AIRWAY INHALATION TREATMENT: CPT

## 2019-11-03 PROCEDURE — 6370000000 HC RX 637 (ALT 250 FOR IP): Performed by: NURSE PRACTITIONER

## 2019-11-03 PROCEDURE — 6370000000 HC RX 637 (ALT 250 FOR IP): Performed by: INTERNAL MEDICINE

## 2019-11-03 PROCEDURE — 85025 COMPLETE CBC W/AUTO DIFF WBC: CPT

## 2019-11-03 PROCEDURE — 80048 BASIC METABOLIC PNL TOTAL CA: CPT

## 2019-11-03 PROCEDURE — 2580000003 HC RX 258: Performed by: INTERNAL MEDICINE

## 2019-11-03 PROCEDURE — 6360000002 HC RX W HCPCS: Performed by: INTERNAL MEDICINE

## 2019-11-03 PROCEDURE — 1200000000 HC SEMI PRIVATE

## 2019-11-03 PROCEDURE — 36415 COLL VENOUS BLD VENIPUNCTURE: CPT

## 2019-11-03 PROCEDURE — 99233 SBSQ HOSP IP/OBS HIGH 50: CPT | Performed by: INTERNAL MEDICINE

## 2019-11-03 RX ORDER — SODIUM CHLORIDE FOR INHALATION 0.9 %
3 VIAL, NEBULIZER (ML) INHALATION 3 TIMES DAILY
Status: ACTIVE | OUTPATIENT
Start: 2019-11-03 | End: 2019-11-04

## 2019-11-03 RX ORDER — ACETAMINOPHEN 325 MG/1
650 TABLET ORAL EVERY 4 HOURS PRN
Status: DISCONTINUED | OUTPATIENT
Start: 2019-11-03 | End: 2019-11-06 | Stop reason: HOSPADM

## 2019-11-03 RX ORDER — LIDOCAINE 4 G/G
1 PATCH TOPICAL DAILY
Status: DISCONTINUED | OUTPATIENT
Start: 2019-11-03 | End: 2019-11-06 | Stop reason: HOSPADM

## 2019-11-03 RX ADMIN — ISODIUM CHLORIDE 3 ML: 0.03 SOLUTION RESPIRATORY (INHALATION) at 19:25

## 2019-11-03 RX ADMIN — AZITHROMYCIN MONOHYDRATE 500 MG: 500 INJECTION, POWDER, LYOPHILIZED, FOR SOLUTION INTRAVENOUS at 15:39

## 2019-11-03 RX ADMIN — Medication 10 ML: at 08:59

## 2019-11-03 RX ADMIN — IPRATROPIUM BROMIDE AND ALBUTEROL SULFATE 1 AMPULE: .5; 3 SOLUTION RESPIRATORY (INHALATION) at 07:28

## 2019-11-03 RX ADMIN — ATENOLOL 25 MG: 25 TABLET ORAL at 08:59

## 2019-11-03 RX ADMIN — GUAIFENESIN AND DEXTROMETHORPHAN 5 ML: 100; 10 SYRUP ORAL at 10:55

## 2019-11-03 RX ADMIN — ACETAMINOPHEN 650 MG: 325 TABLET ORAL at 22:14

## 2019-11-03 RX ADMIN — PANTOPRAZOLE SODIUM 40 MG: 40 TABLET, DELAYED RELEASE ORAL at 06:50

## 2019-11-03 RX ADMIN — GUAIFENESIN AND DEXTROMETHORPHAN 5 ML: 100; 10 SYRUP ORAL at 21:48

## 2019-11-03 RX ADMIN — SIMVASTATIN 20 MG: 10 TABLET, FILM COATED ORAL at 21:47

## 2019-11-03 RX ADMIN — LEVOTHYROXINE SODIUM 100 MCG: 100 TABLET ORAL at 06:50

## 2019-11-03 RX ADMIN — Medication 10 ML: at 21:47

## 2019-11-03 RX ADMIN — AMLODIPINE BESYLATE 10 MG: 5 TABLET ORAL at 08:59

## 2019-11-03 RX ADMIN — ASPIRIN 81 MG: 81 TABLET ORAL at 08:59

## 2019-11-03 RX ADMIN — ENOXAPARIN SODIUM 30 MG: 30 INJECTION SUBCUTANEOUS at 08:59

## 2019-11-03 RX ADMIN — IPRATROPIUM BROMIDE AND ALBUTEROL SULFATE 1 AMPULE: .5; 3 SOLUTION RESPIRATORY (INHALATION) at 11:43

## 2019-11-03 RX ADMIN — IPRATROPIUM BROMIDE AND ALBUTEROL SULFATE 1 AMPULE: .5; 3 SOLUTION RESPIRATORY (INHALATION) at 15:19

## 2019-11-03 RX ADMIN — IPRATROPIUM BROMIDE AND ALBUTEROL SULFATE 1 AMPULE: .5; 3 SOLUTION RESPIRATORY (INHALATION) at 19:25

## 2019-11-03 RX ADMIN — CEFTRIAXONE SODIUM 1 G: 1 INJECTION, POWDER, FOR SOLUTION INTRAMUSCULAR; INTRAVENOUS at 14:21

## 2019-11-03 ASSESSMENT — PAIN SCALES - GENERAL
PAINLEVEL_OUTOF10: 0
PAINLEVEL_OUTOF10: 10
PAINLEVEL_OUTOF10: 0
PAINLEVEL_OUTOF10: 4
PAINLEVEL_OUTOF10: 0

## 2019-11-03 ASSESSMENT — PAIN DESCRIPTION - ORIENTATION
ORIENTATION: LOWER
ORIENTATION: LOWER

## 2019-11-03 ASSESSMENT — PAIN - FUNCTIONAL ASSESSMENT
PAIN_FUNCTIONAL_ASSESSMENT: ACTIVITIES ARE NOT PREVENTED
PAIN_FUNCTIONAL_ASSESSMENT: ACTIVITIES ARE NOT PREVENTED

## 2019-11-03 ASSESSMENT — PAIN DESCRIPTION - PAIN TYPE
TYPE: ACUTE PAIN
TYPE: ACUTE PAIN

## 2019-11-03 ASSESSMENT — PAIN DESCRIPTION - FREQUENCY
FREQUENCY: CONTINUOUS
FREQUENCY: CONTINUOUS

## 2019-11-03 ASSESSMENT — PAIN DESCRIPTION - LOCATION
LOCATION: BACK
LOCATION: BACK

## 2019-11-03 ASSESSMENT — PAIN DESCRIPTION - PROGRESSION
CLINICAL_PROGRESSION: GRADUALLY IMPROVING
CLINICAL_PROGRESSION: NOT CHANGED

## 2019-11-03 ASSESSMENT — PAIN DESCRIPTION - DESCRIPTORS
DESCRIPTORS: ACHING
DESCRIPTORS: DISCOMFORT;ACHING

## 2019-11-03 ASSESSMENT — PAIN DESCRIPTION - ONSET
ONSET: ON-GOING
ONSET: ON-GOING

## 2019-11-04 PROBLEM — R09.89 CHEST CONGESTION: Status: ACTIVE | Noted: 2019-11-04

## 2019-11-04 PROBLEM — I51.89 DIASTOLIC DYSFUNCTION: Status: ACTIVE | Noted: 2019-11-04

## 2019-11-04 PROBLEM — J98.4 RESTRICTIVE LUNG DISEASE: Status: ACTIVE | Noted: 2019-11-04

## 2019-11-04 PROBLEM — K44.9 HH (HIATUS HERNIA): Status: ACTIVE | Noted: 2019-11-04

## 2019-11-04 PROBLEM — R91.8 PULMONARY INFILTRATES: Status: ACTIVE | Noted: 2019-11-04

## 2019-11-04 LAB
BLOOD CULTURE, ROUTINE: NORMAL
CULTURE, BLOOD 2: NORMAL

## 2019-11-04 PROCEDURE — 31645 BRNCHSC W/THER ASPIR 1ST: CPT | Performed by: INTERNAL MEDICINE

## 2019-11-04 PROCEDURE — 99233 SBSQ HOSP IP/OBS HIGH 50: CPT | Performed by: INTERNAL MEDICINE

## 2019-11-04 PROCEDURE — 87070 CULTURE OTHR SPECIMN AEROBIC: CPT

## 2019-11-04 PROCEDURE — 6370000000 HC RX 637 (ALT 250 FOR IP): Performed by: INTERNAL MEDICINE

## 2019-11-04 PROCEDURE — 87116 MYCOBACTERIA CULTURE: CPT

## 2019-11-04 PROCEDURE — 87102 FUNGUS ISOLATION CULTURE: CPT

## 2019-11-04 PROCEDURE — 87015 SPECIMEN INFECT AGNT CONCNTJ: CPT

## 2019-11-04 PROCEDURE — 99152 MOD SED SAME PHYS/QHP 5/>YRS: CPT | Performed by: INTERNAL MEDICINE

## 2019-11-04 PROCEDURE — 94761 N-INVAS EAR/PLS OXIMETRY MLT: CPT

## 2019-11-04 PROCEDURE — 7100000011 HC PHASE II RECOVERY - ADDTL 15 MIN: Performed by: INTERNAL MEDICINE

## 2019-11-04 PROCEDURE — 3609010900 HC BRONCHOSCOPY THERAPUTIC ASPIRATION INITIAL: Performed by: INTERNAL MEDICINE

## 2019-11-04 PROCEDURE — 1200000000 HC SEMI PRIVATE

## 2019-11-04 PROCEDURE — 0BC58ZZ EXTIRPATION OF MATTER FROM RIGHT MIDDLE LOBE BRONCHUS, VIA NATURAL OR ARTIFICIAL OPENING ENDOSCOPIC: ICD-10-PCS | Performed by: INTERNAL MEDICINE

## 2019-11-04 PROCEDURE — 6370000000 HC RX 637 (ALT 250 FOR IP): Performed by: NURSE PRACTITIONER

## 2019-11-04 PROCEDURE — 87206 SMEAR FLUORESCENT/ACID STAI: CPT

## 2019-11-04 PROCEDURE — 2580000003 HC RX 258

## 2019-11-04 PROCEDURE — 6360000002 HC RX W HCPCS: Performed by: INTERNAL MEDICINE

## 2019-11-04 PROCEDURE — 2709999900 HC NON-CHARGEABLE SUPPLY: Performed by: INTERNAL MEDICINE

## 2019-11-04 PROCEDURE — 31624 DX BRONCHOSCOPE/LAVAGE: CPT | Performed by: INTERNAL MEDICINE

## 2019-11-04 PROCEDURE — 87205 SMEAR GRAM STAIN: CPT

## 2019-11-04 PROCEDURE — 2580000003 HC RX 258: Performed by: INTERNAL MEDICINE

## 2019-11-04 PROCEDURE — 0B9J8ZX DRAINAGE OF LEFT LOWER LUNG LOBE, VIA NATURAL OR ARTIFICIAL OPENING ENDOSCOPIC, DIAGNOSTIC: ICD-10-PCS | Performed by: INTERNAL MEDICINE

## 2019-11-04 PROCEDURE — 88305 TISSUE EXAM BY PATHOLOGIST: CPT

## 2019-11-04 PROCEDURE — 2700000000 HC OXYGEN THERAPY PER DAY

## 2019-11-04 PROCEDURE — 87631 RESP VIRUS 3-5 TARGETS: CPT

## 2019-11-04 PROCEDURE — 88112 CYTOPATH CELL ENHANCE TECH: CPT

## 2019-11-04 PROCEDURE — 94640 AIRWAY INHALATION TREATMENT: CPT

## 2019-11-04 PROCEDURE — 0BC68ZZ EXTIRPATION OF MATTER FROM RIGHT LOWER LOBE BRONCHUS, VIA NATURAL OR ARTIFICIAL OPENING ENDOSCOPIC: ICD-10-PCS | Performed by: INTERNAL MEDICINE

## 2019-11-04 PROCEDURE — 7100000010 HC PHASE II RECOVERY - FIRST 15 MIN: Performed by: INTERNAL MEDICINE

## 2019-11-04 PROCEDURE — 3609010800 HC BRONCHOSCOPY ALVEOLAR LAVAGE: Performed by: INTERNAL MEDICINE

## 2019-11-04 RX ORDER — ACETYLCYSTEINE 200 MG/ML
SOLUTION ORAL; RESPIRATORY (INHALATION) PRN
Status: DISCONTINUED | OUTPATIENT
Start: 2019-11-04 | End: 2019-11-04 | Stop reason: HOSPADM

## 2019-11-04 RX ORDER — FENTANYL CITRATE 50 UG/ML
INJECTION, SOLUTION INTRAMUSCULAR; INTRAVENOUS PRN
Status: DISCONTINUED | OUTPATIENT
Start: 2019-11-04 | End: 2019-11-04 | Stop reason: HOSPADM

## 2019-11-04 RX ORDER — SODIUM CHLORIDE FOR INHALATION 3 %
3 VIAL, NEBULIZER (ML) INHALATION 2 TIMES DAILY
Status: DISCONTINUED | OUTPATIENT
Start: 2019-11-04 | End: 2019-11-05

## 2019-11-04 RX ORDER — MIDAZOLAM HYDROCHLORIDE 5 MG/ML
INJECTION INTRAMUSCULAR; INTRAVENOUS PRN
Status: DISCONTINUED | OUTPATIENT
Start: 2019-11-04 | End: 2019-11-04 | Stop reason: HOSPADM

## 2019-11-04 RX ADMIN — AMLODIPINE BESYLATE 10 MG: 5 TABLET ORAL at 08:18

## 2019-11-04 RX ADMIN — CEFTRIAXONE SODIUM 1 G: 1 INJECTION, POWDER, FOR SOLUTION INTRAMUSCULAR; INTRAVENOUS at 14:48

## 2019-11-04 RX ADMIN — IPRATROPIUM BROMIDE AND ALBUTEROL SULFATE 1 AMPULE: .5; 3 SOLUTION RESPIRATORY (INHALATION) at 19:21

## 2019-11-04 RX ADMIN — ISODIUM CHLORIDE 3 ML: 0.03 SOLUTION RESPIRATORY (INHALATION) at 07:48

## 2019-11-04 RX ADMIN — ACETAMINOPHEN 650 MG: 325 TABLET ORAL at 08:18

## 2019-11-04 RX ADMIN — PANTOPRAZOLE SODIUM 40 MG: 40 TABLET, DELAYED RELEASE ORAL at 06:59

## 2019-11-04 RX ADMIN — AZITHROMYCIN MONOHYDRATE 500 MG: 500 INJECTION, POWDER, LYOPHILIZED, FOR SOLUTION INTRAVENOUS at 17:20

## 2019-11-04 RX ADMIN — ATENOLOL 25 MG: 25 TABLET ORAL at 08:18

## 2019-11-04 RX ADMIN — SIMVASTATIN 20 MG: 10 TABLET, FILM COATED ORAL at 20:52

## 2019-11-04 RX ADMIN — SODIUM CHLORIDE SOLN NEBU 3% 3 ML: 3 NEBU SOLN at 19:21

## 2019-11-04 RX ADMIN — Medication 10 ML: at 20:52

## 2019-11-04 RX ADMIN — IPRATROPIUM BROMIDE AND ALBUTEROL SULFATE 1 AMPULE: .5; 3 SOLUTION RESPIRATORY (INHALATION) at 07:47

## 2019-11-04 RX ADMIN — LEVOTHYROXINE SODIUM 100 MCG: 100 TABLET ORAL at 06:59

## 2019-11-04 RX ADMIN — Medication 10 ML: at 08:18

## 2019-11-04 ASSESSMENT — PAIN SCALES - GENERAL
PAINLEVEL_OUTOF10: 3
PAINLEVEL_OUTOF10: 0
PAINLEVEL_OUTOF10: 3
PAINLEVEL_OUTOF10: 0
PAINLEVEL_OUTOF10: 3
PAINLEVEL_OUTOF10: 0
PAINLEVEL_OUTOF10: 0

## 2019-11-04 ASSESSMENT — PAIN DESCRIPTION - LOCATION
LOCATION: BACK
LOCATION: BACK

## 2019-11-04 ASSESSMENT — PAIN DESCRIPTION - ORIENTATION
ORIENTATION: LOWER
ORIENTATION: LOWER

## 2019-11-04 ASSESSMENT — PAIN DESCRIPTION - FREQUENCY: FREQUENCY: CONTINUOUS

## 2019-11-04 ASSESSMENT — PAIN DESCRIPTION - DESCRIPTORS
DESCRIPTORS: ACHING
DESCRIPTORS: ACHING

## 2019-11-04 ASSESSMENT — PAIN DESCRIPTION - PROGRESSION
CLINICAL_PROGRESSION: NOT CHANGED
CLINICAL_PROGRESSION: RESOLVED
CLINICAL_PROGRESSION: RESOLVED

## 2019-11-04 ASSESSMENT — PAIN DESCRIPTION - PAIN TYPE
TYPE: ACUTE PAIN
TYPE: ACUTE PAIN

## 2019-11-04 ASSESSMENT — PAIN DESCRIPTION - ONSET: ONSET: ON-GOING

## 2019-11-05 PROCEDURE — 99232 SBSQ HOSP IP/OBS MODERATE 35: CPT | Performed by: INTERNAL MEDICINE

## 2019-11-05 PROCEDURE — 94640 AIRWAY INHALATION TREATMENT: CPT

## 2019-11-05 PROCEDURE — 97116 GAIT TRAINING THERAPY: CPT

## 2019-11-05 PROCEDURE — 6370000000 HC RX 637 (ALT 250 FOR IP): Performed by: INTERNAL MEDICINE

## 2019-11-05 PROCEDURE — 6360000002 HC RX W HCPCS: Performed by: INTERNAL MEDICINE

## 2019-11-05 PROCEDURE — 1200000000 HC SEMI PRIVATE

## 2019-11-05 PROCEDURE — 2580000003 HC RX 258: Performed by: INTERNAL MEDICINE

## 2019-11-05 RX ORDER — CEFUROXIME AXETIL 250 MG/1
500 TABLET ORAL EVERY 12 HOURS SCHEDULED
Status: DISCONTINUED | OUTPATIENT
Start: 2019-11-05 | End: 2019-11-06 | Stop reason: HOSPADM

## 2019-11-05 RX ORDER — AZITHROMYCIN 250 MG/1
250 TABLET, FILM COATED ORAL DAILY
Status: DISCONTINUED | OUTPATIENT
Start: 2019-11-05 | End: 2019-11-06 | Stop reason: HOSPADM

## 2019-11-05 RX ORDER — IPRATROPIUM BROMIDE AND ALBUTEROL SULFATE 2.5; .5 MG/3ML; MG/3ML
1 SOLUTION RESPIRATORY (INHALATION) EVERY 4 HOURS PRN
Status: DISCONTINUED | OUTPATIENT
Start: 2019-11-05 | End: 2019-11-06 | Stop reason: HOSPADM

## 2019-11-05 RX ADMIN — ATENOLOL 25 MG: 25 TABLET ORAL at 10:24

## 2019-11-05 RX ADMIN — AZITHROMYCIN 250 MG: 250 TABLET, FILM COATED ORAL at 16:42

## 2019-11-05 RX ADMIN — CEFUROXIME AXETIL 500 MG: 250 TABLET ORAL at 22:34

## 2019-11-05 RX ADMIN — PANTOPRAZOLE SODIUM 40 MG: 40 TABLET, DELAYED RELEASE ORAL at 06:53

## 2019-11-05 RX ADMIN — Medication 10 ML: at 10:24

## 2019-11-05 RX ADMIN — ENOXAPARIN SODIUM 30 MG: 30 INJECTION SUBCUTANEOUS at 10:24

## 2019-11-05 RX ADMIN — IPRATROPIUM BROMIDE AND ALBUTEROL SULFATE 1 AMPULE: .5; 3 SOLUTION RESPIRATORY (INHALATION) at 15:50

## 2019-11-05 RX ADMIN — IPRATROPIUM BROMIDE AND ALBUTEROL SULFATE 1 AMPULE: .5; 3 SOLUTION RESPIRATORY (INHALATION) at 11:54

## 2019-11-05 RX ADMIN — SIMVASTATIN 20 MG: 10 TABLET, FILM COATED ORAL at 22:34

## 2019-11-05 RX ADMIN — GUAIFENESIN AND DEXTROMETHORPHAN 5 ML: 100; 10 SYRUP ORAL at 22:37

## 2019-11-05 RX ADMIN — AMLODIPINE BESYLATE 10 MG: 5 TABLET ORAL at 10:24

## 2019-11-05 RX ADMIN — GUAIFENESIN AND DEXTROMETHORPHAN 5 ML: 100; 10 SYRUP ORAL at 10:24

## 2019-11-05 RX ADMIN — ASPIRIN 81 MG: 81 TABLET ORAL at 10:24

## 2019-11-05 RX ADMIN — LEVOTHYROXINE SODIUM 100 MCG: 100 TABLET ORAL at 06:53

## 2019-11-05 ASSESSMENT — PAIN SCALES - GENERAL
PAINLEVEL_OUTOF10: 0

## 2019-11-06 VITALS
RESPIRATION RATE: 20 BRPM | OXYGEN SATURATION: 91 % | DIASTOLIC BLOOD PRESSURE: 84 MMHG | WEIGHT: 128 LBS | TEMPERATURE: 99 F | SYSTOLIC BLOOD PRESSURE: 159 MMHG | HEART RATE: 75 BPM | BODY MASS INDEX: 25.13 KG/M2 | HEIGHT: 60 IN

## 2019-11-06 LAB — MISCELLANEOUS LAB TEST ORDER: NORMAL

## 2019-11-06 PROCEDURE — 6370000000 HC RX 637 (ALT 250 FOR IP): Performed by: INTERNAL MEDICINE

## 2019-11-06 PROCEDURE — 99232 SBSQ HOSP IP/OBS MODERATE 35: CPT | Performed by: INTERNAL MEDICINE

## 2019-11-06 RX ORDER — CEFUROXIME AXETIL 500 MG/1
500 TABLET ORAL EVERY 12 HOURS SCHEDULED
Qty: 14 TABLET | Refills: 0 | Status: SHIPPED | OUTPATIENT
Start: 2019-11-06 | End: 2019-11-13

## 2019-11-06 RX ORDER — AZITHROMYCIN 250 MG/1
250 TABLET, FILM COATED ORAL DAILY
Qty: 3 TABLET | Refills: 0 | Status: SHIPPED | OUTPATIENT
Start: 2019-11-07 | End: 2019-11-10

## 2019-11-06 RX ADMIN — LEVOTHYROXINE SODIUM 100 MCG: 100 TABLET ORAL at 06:43

## 2019-11-06 RX ADMIN — ATENOLOL 25 MG: 25 TABLET ORAL at 10:35

## 2019-11-06 RX ADMIN — AMLODIPINE BESYLATE 10 MG: 5 TABLET ORAL at 10:35

## 2019-11-06 RX ADMIN — ASPIRIN 81 MG: 81 TABLET ORAL at 10:35

## 2019-11-06 RX ADMIN — PANTOPRAZOLE SODIUM 40 MG: 40 TABLET, DELAYED RELEASE ORAL at 06:43

## 2019-11-06 RX ADMIN — CEFUROXIME AXETIL 500 MG: 250 TABLET ORAL at 10:35

## 2019-11-06 RX ADMIN — AZITHROMYCIN 250 MG: 250 TABLET, FILM COATED ORAL at 10:35

## 2019-11-07 LAB
CULTURE, RESPIRATORY: NORMAL
GRAM STAIN RESULT: NORMAL

## 2019-11-08 ENCOUNTER — CARE COORDINATION (OUTPATIENT)
Dept: CASE MANAGEMENT | Age: 84
End: 2019-11-08

## 2019-12-09 LAB
FUNGUS (MYCOLOGY) CULTURE: NORMAL
FUNGUS STAIN: NORMAL

## 2019-12-24 LAB
AFB CULTURE (MYCOBACTERIA): NORMAL
AFB SMEAR: NORMAL

## 2020-01-30 ENCOUNTER — CARE COORDINATION (OUTPATIENT)
Dept: CASE MANAGEMENT | Age: 85
End: 2020-01-30

## 2020-01-30 NOTE — CARE COORDINATION
[facility] Transitions Follow Up Call    2020    Patient: Anatoly Augustine  Patient : 1926   MRN: <[id number]>  Reason for Admission:   Discharge Date: 19 RARS: Readmission Risk Score: 15         Spoke with: Attempted to contact patient for follow up BPCI-A transition call. No answer - unable to leave message. This is the Final B[facility] Call (Patient has been followed for 90 days). Care Transitions Subsequent and Final Call    Subsequent and Final Calls  Care Transitions Interventions                          Other Interventions: Follow Up  No future appointments.     López Fink LPN

## 2021-07-13 ENCOUNTER — APPOINTMENT (OUTPATIENT)
Dept: GENERAL RADIOLOGY | Age: 86
End: 2021-07-13
Payer: MEDICARE

## 2021-07-13 ENCOUNTER — APPOINTMENT (OUTPATIENT)
Dept: CT IMAGING | Age: 86
End: 2021-07-13
Payer: MEDICARE

## 2021-07-13 ENCOUNTER — HOSPITAL ENCOUNTER (EMERGENCY)
Age: 86
Discharge: HOME | End: 2021-07-13
Payer: MEDICARE

## 2021-07-13 VITALS
WEIGHT: 128 LBS | RESPIRATION RATE: 18 BRPM | HEART RATE: 80 BPM | TEMPERATURE: 98.6 F | OXYGEN SATURATION: 93 % | SYSTOLIC BLOOD PRESSURE: 134 MMHG | DIASTOLIC BLOOD PRESSURE: 67 MMHG | HEIGHT: 60 IN | BODY MASS INDEX: 25.13 KG/M2

## 2021-07-13 DIAGNOSIS — R19.7 NAUSEA VOMITING AND DIARRHEA: Primary | ICD-10-CM

## 2021-07-13 DIAGNOSIS — R11.2 NAUSEA VOMITING AND DIARRHEA: Primary | ICD-10-CM

## 2021-07-13 DIAGNOSIS — N28.9 RENAL INSUFFICIENCY: ICD-10-CM

## 2021-07-13 DIAGNOSIS — K52.9 ENTERITIS: ICD-10-CM

## 2021-07-13 LAB
A/G RATIO: 1 (ref 1.1–2.2)
ALBUMIN SERPL-MCNC: 3.9 G/DL (ref 3.4–5)
ALP BLD-CCNC: 98 U/L (ref 40–129)
ALT SERPL-CCNC: 18 U/L (ref 10–40)
ANION GAP SERPL CALCULATED.3IONS-SCNC: 17 MMOL/L (ref 3–16)
AST SERPL-CCNC: 35 U/L (ref 15–37)
BASOPHILS ABSOLUTE: 0 K/UL (ref 0–0.2)
BASOPHILS RELATIVE PERCENT: 0.2 %
BILIRUB SERPL-MCNC: 0.3 MG/DL (ref 0–1)
BILIRUBIN URINE: NEGATIVE
BLOOD, URINE: ABNORMAL
BUN BLDV-MCNC: 39 MG/DL (ref 7–20)
CALCIUM SERPL-MCNC: 8.9 MG/DL (ref 8.3–10.6)
CHLORIDE BLD-SCNC: 99 MMOL/L (ref 99–110)
CLARITY: CLEAR
CO2: 17 MMOL/L (ref 21–32)
COLOR: YELLOW
CREAT SERPL-MCNC: 1.6 MG/DL (ref 0.6–1.2)
EKG ATRIAL RATE: 78 BPM
EKG DIAGNOSIS: NORMAL
EKG P AXIS: -3 DEGREES
EKG P-R INTERVAL: 144 MS
EKG Q-T INTERVAL: 364 MS
EKG QRS DURATION: 76 MS
EKG QTC CALCULATION (BAZETT): 414 MS
EKG R AXIS: 9 DEGREES
EKG T AXIS: 69 DEGREES
EKG VENTRICULAR RATE: 78 BPM
EOSINOPHILS ABSOLUTE: 0 K/UL (ref 0–0.6)
EOSINOPHILS RELATIVE PERCENT: 0.2 %
EPITHELIAL CELLS, UA: ABNORMAL /HPF (ref 0–5)
GFR AFRICAN AMERICAN: 36
GFR NON-AFRICAN AMERICAN: 30
GLOBULIN: 3.8 G/DL
GLUCOSE BLD-MCNC: 136 MG/DL (ref 70–99)
GLUCOSE URINE: NEGATIVE MG/DL
HCT VFR BLD CALC: 37.1 % (ref 36–48)
HEMOGLOBIN: 12.8 G/DL (ref 12–16)
KETONES, URINE: NEGATIVE MG/DL
LEUKOCYTE ESTERASE, URINE: NEGATIVE
LIPASE: 39 U/L (ref 13–60)
LYMPHOCYTES ABSOLUTE: 0.3 K/UL (ref 1–5.1)
LYMPHOCYTES RELATIVE PERCENT: 5 %
MCH RBC QN AUTO: 31.8 PG (ref 26–34)
MCHC RBC AUTO-ENTMCNC: 34.6 G/DL (ref 31–36)
MCV RBC AUTO: 91.8 FL (ref 80–100)
MICROSCOPIC EXAMINATION: YES
MONOCYTES ABSOLUTE: 0.6 K/UL (ref 0–1.3)
MONOCYTES RELATIVE PERCENT: 9 %
NEUTROPHILS ABSOLUTE: 5.7 K/UL (ref 1.7–7.7)
NEUTROPHILS RELATIVE PERCENT: 85.6 %
NITRITE, URINE: NEGATIVE
PDW BLD-RTO: 13.1 % (ref 12.4–15.4)
PH UA: 5.5 (ref 5–8)
PLATELET # BLD: 191 K/UL (ref 135–450)
PMV BLD AUTO: 8.6 FL (ref 5–10.5)
POTASSIUM SERPL-SCNC: 5.3 MMOL/L (ref 3.5–5.1)
PRO-BNP: 508 PG/ML (ref 0–449)
PROTEIN UA: NEGATIVE MG/DL
RBC # BLD: 4.04 M/UL (ref 4–5.2)
RBC UA: ABNORMAL /HPF (ref 0–4)
SODIUM BLD-SCNC: 133 MMOL/L (ref 136–145)
SPECIFIC GRAVITY UA: 1.02 (ref 1–1.03)
TOTAL PROTEIN: 7.7 G/DL (ref 6.4–8.2)
TROPONIN: <0.01 NG/ML
URINE TYPE: ABNORMAL
UROBILINOGEN, URINE: 0.2 E.U./DL
WBC # BLD: 6.7 K/UL (ref 4–11)
WBC UA: ABNORMAL /HPF (ref 0–5)

## 2021-07-13 PROCEDURE — 93010 ELECTROCARDIOGRAM REPORT: CPT | Performed by: INTERNAL MEDICINE

## 2021-07-13 PROCEDURE — 99285 EMERGENCY DEPT VISIT HI MDM: CPT

## 2021-07-13 PROCEDURE — 85025 COMPLETE CBC W/AUTO DIFF WBC: CPT

## 2021-07-13 PROCEDURE — 6360000002 HC RX W HCPCS: Performed by: PHYSICIAN ASSISTANT

## 2021-07-13 PROCEDURE — 84484 ASSAY OF TROPONIN QUANT: CPT

## 2021-07-13 PROCEDURE — 96361 HYDRATE IV INFUSION ADD-ON: CPT

## 2021-07-13 PROCEDURE — 80053 COMPREHEN METABOLIC PANEL: CPT

## 2021-07-13 PROCEDURE — 71045 X-RAY EXAM CHEST 1 VIEW: CPT

## 2021-07-13 PROCEDURE — 81001 URINALYSIS AUTO W/SCOPE: CPT

## 2021-07-13 PROCEDURE — 93005 ELECTROCARDIOGRAM TRACING: CPT | Performed by: PHYSICIAN ASSISTANT

## 2021-07-13 PROCEDURE — 83690 ASSAY OF LIPASE: CPT

## 2021-07-13 PROCEDURE — 74176 CT ABD & PELVIS W/O CONTRAST: CPT

## 2021-07-13 PROCEDURE — 2580000003 HC RX 258: Performed by: PHYSICIAN ASSISTANT

## 2021-07-13 PROCEDURE — 83880 ASSAY OF NATRIURETIC PEPTIDE: CPT

## 2021-07-13 PROCEDURE — 96374 THER/PROPH/DIAG INJ IV PUSH: CPT

## 2021-07-13 RX ORDER — ONDANSETRON 4 MG/1
4 TABLET, ORALLY DISINTEGRATING ORAL EVERY 8 HOURS PRN
Qty: 8 TABLET | Refills: 0 | Status: SHIPPED | OUTPATIENT
Start: 2021-07-13

## 2021-07-13 RX ORDER — ONDANSETRON 2 MG/ML
4 INJECTION INTRAMUSCULAR; INTRAVENOUS ONCE
Status: COMPLETED | OUTPATIENT
Start: 2021-07-13 | End: 2021-07-13

## 2021-07-13 RX ORDER — SACCHAROMYCES BOULARDII 250 MG
250 CAPSULE ORAL 2 TIMES DAILY
Qty: 14 CAPSULE | Refills: 0 | Status: SHIPPED | OUTPATIENT
Start: 2021-07-13 | End: 2021-07-20

## 2021-07-13 RX ORDER — 0.9 % SODIUM CHLORIDE 0.9 %
1000 INTRAVENOUS SOLUTION INTRAVENOUS ONCE
Status: COMPLETED | OUTPATIENT
Start: 2021-07-13 | End: 2021-07-13

## 2021-07-13 RX ADMIN — ONDANSETRON 4 MG: 2 INJECTION INTRAMUSCULAR; INTRAVENOUS at 17:07

## 2021-07-13 RX ADMIN — SODIUM CHLORIDE 1000 ML: 9 INJECTION, SOLUTION INTRAVENOUS at 17:07

## 2021-07-13 NOTE — ED NOTES
Patient identified as a positive fall risk on the ED triage fall screening. Patient placed in fall precautions which includes:  yellow fall risk bracelet on wrist,patient wearing shoes, \"Be Safe\" sign placed on patient's door, and bed alarm placed under patient/alarm turned on. Patient instructed on importance of not getting out of bed or ambulating without assistance for safety.              Brooks Patel RN  07/13/21 1900

## 2021-07-13 NOTE — ED NOTES
Bed: G-01  Expected date:   Expected time:   Means of arrival: [facility]  Comments:  [address], RN  07/13/21 4774

## 2021-07-13 NOTE — ED TRIAGE NOTES
Patient flagged as a fall risk and will be placed in fall precautions upon placement in ED room Given her sxs of palpitations, tachycardia, and CP  -f/u CTA chest Given her sxs of palpitations, tachycardia, and CP  -CTA chest. negative for PE

## 2021-07-13 NOTE — ED PROVIDER NOTES
[facility]  ED  EMERGENCY DEPARTMENT ENCOUNTER        Pt Name: Danish Franco  MRN: [id number]  [place] 4/21/1926  Date of evaluation: 7/13/2021  Provider: Nita Kong PA-C  PCP: Moody Higuera MD  Note Started: 4:51 PM EDT       GEGE. I have evaluated this patient. My supervising physician was available for consultation. Jenna Galvez MD      CHIEF COMPLAINT       Chief Complaint   Patient presents with    Emesis     per squad report patient c/o emesis and diarrhea that started after eating x 1 hour ago.  Diarrhea       HISTORY OF PRESENT ILLNESS   (Location, Timing/Onset, Context/Setting, Quality, Duration, Modifying Factors, Severity, Associated Signs and Symptoms)  Note limiting factors. Chief Complaint: Nausea, vomiting, diarrhea    Danish Franco is a 80 y.o. female who presents with complaint of mild abdominal discomfort with associated nausea, vomiting, diarrhea. She indicates to me that she had some pimento cheese and crackers which she is seen previously along with some chicken rice soup about noon. At 1 PM she began to experience some intestinal cramping with associated nausea. She has had vomiting and diarrhea she states several times. No blood or mucus noted in the emesis or the diarrhea. She indicates she has not had previous similar. She has had previous cholecystectomy. She previously treated with Pepto-Bismol which resulted in emesis. She indicates no urinary complaints. She reports no chest pain or shortness of breath. The patient with history of HLD, HTN, right renal neoplasm with partial nephrectomy. She has had previous EGD and colonoscopy. She does have history of glaucoma, GERD, osteoporosis, IBS. Current medications include atenolol 25, amlodipine 10, simvastatin 20, omeprazole 20, levothyroxine 100 and aspirin 81 mg daily. The patient does live in [address] [place] [address] with her daughter. She indicates no obvious ill exposure.   Daughter in good health. Nursing Notes were all reviewed and agreed with or any disagreements were addressed in the HPI. REVIEW OF SYSTEMS    (2-9 systems for level 4, 10 or more for level 5)     Review of Systems    Positives and Pertinent negatives as per HPI. Except as noted above in the ROS, all other systems were reviewed and negative.        PAST MEDICAL HISTORY     Past Medical History:   Diagnosis Date    Amaurosis fugax of left eye 3/15/2011    Benign neoplasm     colon    Cancer of kidney ([address] 11/03/2010    Right    Colonic polyp 1999    Cyst of ovary, left 9/2/2012    Dehydration 2/9/2011    Diverticulosis of colon 1999    Dyslipidemia 2008    GERD (gastroesophageal reflux disease)     Glaucoma     Hyperlipidemia     Hypertension 1960    IBS (irritable bowel syndrome)     Osteoporosis     Other specified gastritis without mention of hemorrhage 1998    Pericardial effusion 8/23/2011         SURGICAL HISTORY     Past Surgical History:   Procedure Laterality Date    BRONCHOSCOPY N/A 11/04/2019    BRONCHOSCOPY N/A 11/4/2019    BRONCHOSCOPY ALVEOLAR LAVAGE performed by Fady William MD at [address]  11/4/2019    BRONCHOSCOPY THERAPUTIC ASPIRATION INITIAL [facility]  02/11/[facility]    COLONOSCOPY  08/24/2009    TA & Diverticulosis    COLONOSCOPY  07/17/2001    diverticulosis    COLONOSCOPY  08/29/2006    diverticulosis    PARTIAL NEPHRECTOMY  11/03/2010    Right    UPPER GASTROINTESTINAL ENDOSCOPY  02/11/1998    chronic gastritis    UPPER GASTROINTESTINAL ENDOSCOPY  09/10/1999    chronic gastritis    UPPER GASTROINTESTINAL ENDOSCOPY  07/17/2001    chronic gastritis w intestinal metaplasia    UPPER GASTROINTESTINAL ENDOSCOPY  02/03/2004    chronic gastritis w intestinal metaplasia    UPPER GASTROINTESTINAL ENDOSCOPY  11/29/2007    gastritis         CURRENTMEDICATIONS       Discharge Medication List as of 7/13/2021  8:31 PM      CONTINUE these medications which have NOT CHANGED    Details   atenolol (TENORMIN) 25 MG tablet Take 1 tablet by mouth daily. , Disp-30 tablet, R-3      amLODIPine (NORVASC) 10 MG tablet Take 10 mg by mouth daily. simvastatin (ZOCOR) 20 MG tablet Take 20 mg by mouth nightly. NASAL SALINE NA by Nasal route as needed       meclizine (ANTIVERT) 25 MG tablet Take 25 mg by mouth 3 times daily as needed. fluticasone (FLONASE) 50 MCG/ACT nasal spray 1 spray by Nasal route as needed       omeprazole (PRILOSEC) 20 MG capsule Take 20 mg by mouth daily. levothyroxine (SYNTHROID) 100 MCG tablet Take 100 mcg by mouth daily. aspirin 81 MG EC tablet Take 81 mg by mouth daily. ALLERGIES     Avelox [moxifloxacin hydrochloride], Benadryl [diphenhydramine hcl], Biaxin [clarithromycin], Cefuroxime, Codeine, Doxycycline, Morphine, Norgesic [orphenadrine-aspirin-caffeine], Opium, Penicillins, Promethazine, Propoxyphene, Sulfa antibiotics, Tetracyclines & related, and Tramadol    FAMILYHISTORY     History reviewed. No pertinent family history. SOCIAL HISTORY       Social History     Tobacco Use    Smoking status: Never Smoker    Smokeless tobacco: Never Used   Vaping Use    Vaping Use: Never assessed   Substance Use Topics    Alcohol use: No    Drug use: No       SCREENINGS             PHYSICAL EXAM    (up to 7 for level 4, 8 or more for level 5)     ED Triage Vitals [07/13/21 1533]   BP Temp Temp src Pulse Resp SpO2 Height Weight   -- -- -- -- -- -- 5' (1.524 m) 128 lb (58.1 kg)       Physical Exam  Vitals and nursing note reviewed. Constitutional:       Appearance: Normal appearance. She is well-developed and normal weight. HENT:      Head: Normocephalic and atraumatic. Right Ear: External ear normal.      Left Ear: External ear normal.      Mouth/Throat:      Mouth: Mucous membranes are moist.      Pharynx: Oropharynx is clear.    Eyes: URINALYSIS - Abnormal; Notable for the following components:    Blood, Urine SMALL (*)     All other components within normal limits    Narrative:     Performed at:  [address], [facility] [employer]   Phone [phone]   BRAIN NATRIURETIC PEPTIDE - Abnormal; Notable for the following components:    Pro- (*)     All other components within normal limits    Narrative:     Performed at:  [place] [address] [employer]   Phone [phone]   MICROSCOPIC URINALYSIS - Abnormal; Notable for the following components:    Epithelial Cells, UA 11-20 (*)     All other components within normal limits    Narrative:     Performed at:  [address], [facility] [employer]   Phone [phone]   LIPASE    Narrative:     Performed at:  [place] [address] [employer]   Phone [phone]   TROPONIN    Narrative:     Performed at:  [place] [address] [employer]   Phone [phone]       When ordered only abnormal lab results are displayed. All other labs were within normal range or not returned as of this dictation. EKG: When ordered, EKG's are interpreted by the Emergency Department Physician in the absence of a cardiologist.  Please see their note for interpretation of EKG. RADIOLOGY:   Non-plain film images such as CT, Ultrasound and MRI are read by the radiologist. Plain radiographic images are visualized and preliminarily interpreted by the ED Provider with the below findings:        Interpretation per the Radiologist below, if available at the time of this note:    CT ABDOMEN PELVIS WO CONTRAST Additional Contrast? None   Final Result   Minimal dilation of proximal small bowel loops, without focal area of   transition.   Enteritis is considered most likely. Small to moderate-sized pericardial effusion, increased compared with 2018. XR CHEST PORTABLE   Final Result   No acute cardiopulmonary disease. Cardiomegaly without overt failure. No results found. PROCEDURES   Unless otherwise noted below, none     Procedures    CRITICAL CARE TIME   N/A    CONSULTS:  None      EMERGENCY DEPARTMENT COURSE and DIFFERENTIAL DIAGNOSIS/MDM:   Vitals:    Vitals:    07/13/21 1657 07/13/21 1751 07/13/21 1917 07/13/21 2038   BP: (!) 151/69 (!) 147/73 (!) 142/71 134/67   Pulse: 76 78 83 80   Resp: 16 20 18 18   Temp:   98.8 °F (37.1 °C) 98.6 °F (37 °C)   TempSrc:   Oral Oral   SpO2: 94% 94% 93% 93%   Weight:       Height:           Patient was given the following medications:  Medications   0.9 % sodium chloride bolus (0 mLs Intravenous Stopped 7/13/21 2041)   ondansetron (ZOFRAN) injection 4 mg (4 mg Intravenous Given 7/13/21 1707)           Patient presenting with her daughter with complaint of nausea, vomiting and diarrhea. This began at 1 PM this afternoon. She had no emesis here in the emergency room nor any diarrhea in the emergency room. I did obtain CT scan showing evidence of an enteritis. I believe this patient has a gastroenterocolitis. Antibiotics not indicated. Laboratory studies taken showing no evidence UTI. WBC 6.7 hemoglobin 12.8. The patient CMP shows a BUN 39, creatinine 1.6 and GFR 30. This is slightly worse than previous studies. She did receive 1 L of fluid. Recommend strong oral hydration. I have also gave patient outpatient order for BMP. She is to obtain a study on Thursday. She is to see her healthcare provider on Friday for reevaluation. The patient and daughter both expressed understanding of diagnosis and the treatment plan. ED treatment 1 L saline and Zofran 4 mg IV x1. I discharge I did prescribe for the patient Zofran and Florastor. FINAL IMPRESSION      1. Nausea vomiting and diarrhea    2. Enteritis    3. Renal insufficiency          DISPOSITION/PLAN   DISPOSITION Decision To Discharge 07/13/2021 08:29:09 PM      PATIENT REFERRED TO:  No follow-up provider specified. DISCHARGE MEDICATIONS:  Discharge Medication List as of 7/13/2021  8:31 PM      START taking these medications    Details   saccharomyces boulardii (FLORASTOR) 250 MG capsule Take 1 capsule by mouth 2 times daily for 7 days, Disp-14 capsule, R-0Print      ondansetron (ZOFRAN ODT) 4 MG disintegrating tablet Take 1 tablet by mouth every 8 hours as needed for Nausea or Vomiting, Disp-8 tablet, R-0Print             DISCONTINUED MEDICATIONS:  Discharge Medication List as of 7/13/2021  8:31 PM                 (Please note that portions of this note were completed with a voice recognition program.  Efforts were made to edit the dictations but occasionally words are mis-transcribed. )    Rachel Farris PA-C (electronically signed)           Rachel Farris PA-C  07/14/21 0002

## 2021-07-14 NOTE — ED PROVIDER NOTES
Reviewed ECG completed for Jackqulyn Bloch. I did not see this patient and was not involved in their care. ECG  The Ekg interpreted by me shows  normal sinus rhythm with a rate of 78  Axis is   Normal  QTc is  within an acceptable range  Intervals and Durations are unremarkable.       ST Segments: no acute change  No significant change from prior EKG dated 10/30/19        Yoselin Zaldivar MD  07/13/21 4989

## 2021-07-14 NOTE — ED NOTES
Removed PIV, reviewed discharge information including prescriptions.   Pt left with all personal belongings in wheelchair     Johanna Masters RN  07/13/21 2047

## 2021-11-01 ENCOUNTER — APPOINTMENT (OUTPATIENT)
Dept: GENERAL RADIOLOGY | Age: 86
End: 2021-11-01
Payer: MEDICARE

## 2021-11-01 ENCOUNTER — HOSPITAL ENCOUNTER (EMERGENCY)
Age: 86
Discharge: HOME | End: 2021-11-01
Payer: MEDICARE

## 2021-11-01 VITALS
HEIGHT: 60 IN | OXYGEN SATURATION: 96 % | WEIGHT: 128 LBS | TEMPERATURE: 98.1 F | DIASTOLIC BLOOD PRESSURE: 64 MMHG | BODY MASS INDEX: 25.13 KG/M2 | HEART RATE: 74 BPM | SYSTOLIC BLOOD PRESSURE: 150 MMHG | RESPIRATION RATE: 20 BRPM

## 2021-11-01 DIAGNOSIS — J18.9 PNEUMONIA OF LEFT LUNG DUE TO INFECTIOUS ORGANISM, UNSPECIFIED PART OF LUNG: Primary | ICD-10-CM

## 2021-11-01 DIAGNOSIS — R68.83 CHILLS: ICD-10-CM

## 2021-11-01 LAB
A/G RATIO: 1.2 (ref 1.1–2.2)
ALBUMIN SERPL-MCNC: 4.3 G/DL (ref 3.4–5)
ALP BLD-CCNC: 100 U/L (ref 40–129)
ALT SERPL-CCNC: 16 U/L (ref 10–40)
ANION GAP SERPL CALCULATED.3IONS-SCNC: 13 MMOL/L (ref 3–16)
AST SERPL-CCNC: 21 U/L (ref 15–37)
BASOPHILS ABSOLUTE: 0.1 K/UL (ref 0–0.2)
BASOPHILS RELATIVE PERCENT: 1 %
BILIRUB SERPL-MCNC: 0.4 MG/DL (ref 0–1)
BUN BLDV-MCNC: 29 MG/DL (ref 7–20)
CALCIUM SERPL-MCNC: 10.4 MG/DL (ref 8.3–10.6)
CHLORIDE BLD-SCNC: 95 MMOL/L (ref 99–110)
CO2: 27 MMOL/L (ref 21–32)
CREAT SERPL-MCNC: 1.3 MG/DL (ref 0.6–1.2)
EOSINOPHILS ABSOLUTE: 0 K/UL (ref 0–0.6)
EOSINOPHILS RELATIVE PERCENT: 0.7 %
GFR AFRICAN AMERICAN: 46
GFR NON-AFRICAN AMERICAN: 38
GLUCOSE BLD-MCNC: 113 MG/DL (ref 70–99)
HCT VFR BLD CALC: 37.8 % (ref 36–48)
HEMOGLOBIN: 13 G/DL (ref 12–16)
LYMPHOCYTES ABSOLUTE: 0.9 K/UL (ref 1–5.1)
LYMPHOCYTES RELATIVE PERCENT: 17.2 %
MCH RBC QN AUTO: 31.8 PG (ref 26–34)
MCHC RBC AUTO-ENTMCNC: 34.3 G/DL (ref 31–36)
MCV RBC AUTO: 92.8 FL (ref 80–100)
MONOCYTES ABSOLUTE: 0.5 K/UL (ref 0–1.3)
MONOCYTES RELATIVE PERCENT: 9.1 %
NEUTROPHILS ABSOLUTE: 3.9 K/UL (ref 1.7–7.7)
NEUTROPHILS RELATIVE PERCENT: 72 %
PDW BLD-RTO: 13.4 % (ref 12.4–15.4)
PLATELET # BLD: 189 K/UL (ref 135–450)
PMV BLD AUTO: 7.9 FL (ref 5–10.5)
POTASSIUM REFLEX MAGNESIUM: 4.3 MMOL/L (ref 3.5–5.1)
RAPID INFLUENZA  B AGN: NEGATIVE
RAPID INFLUENZA A AGN: NEGATIVE
RBC # BLD: 4.08 M/UL (ref 4–5.2)
SARS-COV-2, NAAT: NOT DETECTED
SODIUM BLD-SCNC: 135 MMOL/L (ref 136–145)
TOTAL PROTEIN: 7.8 G/DL (ref 6.4–8.2)
WBC # BLD: 5.4 K/UL (ref 4–11)

## 2021-11-01 PROCEDURE — 6370000000 HC RX 637 (ALT 250 FOR IP): Performed by: PHYSICIAN ASSISTANT

## 2021-11-01 PROCEDURE — 87635 SARS-COV-2 COVID-19 AMP PRB: CPT

## 2021-11-01 PROCEDURE — 80053 COMPREHEN METABOLIC PANEL: CPT

## 2021-11-01 PROCEDURE — 87804 INFLUENZA ASSAY W/OPTIC: CPT

## 2021-11-01 PROCEDURE — 85025 COMPLETE CBC W/AUTO DIFF WBC: CPT

## 2021-11-01 PROCEDURE — 71045 X-RAY EXAM CHEST 1 VIEW: CPT

## 2021-11-01 PROCEDURE — 99284 EMERGENCY DEPT VISIT MOD MDM: CPT

## 2021-11-01 RX ORDER — LEVOFLOXACIN 750 MG/1
750 TABLET ORAL ONCE
Status: DISCONTINUED | OUTPATIENT
Start: 2021-11-01 | End: 2021-11-01

## 2021-11-01 RX ORDER — LEVOFLOXACIN 750 MG/1
750 TABLET ORAL DAILY
Qty: 5 TABLET | Refills: 0 | Status: SHIPPED | OUTPATIENT
Start: 2021-11-01 | End: 2021-11-01 | Stop reason: CLARIF

## 2021-11-01 RX ORDER — AZITHROMYCIN 250 MG/1
TABLET, FILM COATED ORAL
Qty: 1 PACKET | Refills: 0 | Status: ON HOLD | OUTPATIENT
Start: 2021-11-01 | End: 2021-11-05 | Stop reason: HOSPADM

## 2021-11-01 RX ORDER — AZITHROMYCIN 250 MG/1
500 TABLET, FILM COATED ORAL ONCE
Status: COMPLETED | OUTPATIENT
Start: 2021-11-01 | End: 2021-11-01

## 2021-11-01 RX ORDER — ACETAMINOPHEN 500 MG
1000 TABLET ORAL ONCE
Status: COMPLETED | OUTPATIENT
Start: 2021-11-01 | End: 2021-11-01

## 2021-11-01 RX ADMIN — ACETAMINOPHEN 1000 MG: 500 TABLET ORAL at 10:42

## 2021-11-01 RX ADMIN — AZITHROMYCIN 500 MG: 250 TABLET, FILM COATED ORAL at 11:38

## 2021-11-01 ASSESSMENT — PAIN SCALES - GENERAL
PAINLEVEL_OUTOF10: 0
PAINLEVEL_OUTOF10: 0

## 2021-11-01 ASSESSMENT — ENCOUNTER SYMPTOMS
BACK PAIN: 0
ABDOMINAL PAIN: 0
EYES NEGATIVE: 1
VOMITING: 0
COUGH: 1
COLOR CHANGE: 0
NAUSEA: 0
SHORTNESS OF BREATH: 0

## 2021-11-01 NOTE — ED PROVIDER NOTES
[address] [facility]  ED  EMERGENCY DEPARTMENT ENCOUNTER        Pt Name: Deborah Walton  MRN: [id number]  [place] 4/21/1926  Date of evaluation: 11/1/2021  Provider: Nikki Olivo PA-C  PCP: Tay Gibbons MD  ED Attending: Dilip Stewart MD      This patient was not seen by the attending provider      History provided by the patient    CHIEF COMPLAINT:     Chief Complaint   Patient presents with    Cough     pt c/o cough and chills x 1 week. was tested for covid with a negative result. HISTORY OF PRESENT ILLNESS:      Deborah Walton is a 80 y.o. female who arrives to the ED by private vehicle. The patient lives with her daughter and her daughter brought her in. Patient states she has been sick for approximately 1 week. She reports fevers, chills and a dry cough. She denies being in any pain. She denies being short of breath. She was tested for Covid which was negative. She is vaccinated x2. Patient is here expressing concern for possible pneumonia. She cannot identify exacerbating or alleviating factors to her symptoms. Nursing Notes were reviewed     REVIEW OF SYSTEMS:     Review of Systems   Constitutional: Positive for activity change, appetite change, chills, fatigue and fever. HENT: Positive for congestion. Eyes: Negative. Respiratory: Positive for cough. Negative for shortness of breath. Cardiovascular: Negative for chest pain. Gastrointestinal: Negative for abdominal pain, nausea and vomiting. Genitourinary: Negative. Musculoskeletal: Negative for back pain, gait problem and neck pain. Skin: Negative for color change. Neurological: Negative for dizziness and headaches. All other systems reviewed and are negative. Except as noted above in the ROS, all other systems were reviewed and negative.          PAST MEDICAL HISTORY:     Past Medical History:   Diagnosis Date    Amaurosis fugax of left eye 3/15/2011    Benign neoplasm     colon    Cancer of kidney ([address] 11/03/2010    Right    Colonic polyp 1999    Cyst of ovary, left 9/2/2012    Dehydration 2/9/2011    Diverticulosis of colon 1999    Dyslipidemia 2008    GERD (gastroesophageal reflux disease)     Glaucoma     Hyperlipidemia     Hypertension 1960    IBS (irritable bowel syndrome)     Osteoporosis     Other specified gastritis without mention of hemorrhage 1998    Pericardial effusion 8/23/2011         SURGICAL HISTORY:      Past Surgical History:   Procedure Laterality Date    BRONCHOSCOPY N/A 11/04/2019    BRONCHOSCOPY N/A 11/4/2019    BRONCHOSCOPY ALVEOLAR LAVAGE performed by Del Styles MD at [address]  11/4/2019    BRONCHOSCOPY THERAPUTIC ASPIRATION INITIAL performed by Del Styles MD at [address], LAPAROSCOPIC  02/11/1998    COLONOSCOPY  08/24/2009    TA & Diverticulosis    COLONOSCOPY  07/17/2001    diverticulosis    COLONOSCOPY  08/29/2006    diverticulosis    PARTIAL NEPHRECTOMY  11/03/2010    Right    UPPER GASTROINTESTINAL ENDOSCOPY  02/11/1998    chronic gastritis    UPPER GASTROINTESTINAL ENDOSCOPY  09/10/1999    chronic gastritis    UPPER GASTROINTESTINAL ENDOSCOPY  07/17/2001    chronic gastritis w intestinal metaplasia    UPPER GASTROINTESTINAL ENDOSCOPY  02/03/2004    chronic gastritis w intestinal metaplasia    UPPER GASTROINTESTINAL ENDOSCOPY  11/29/2007    gastritis         CURRENT MEDICATIONS:       Discharge Medication List as of 11/1/2021 11:34 AM      CONTINUE these medications which have NOT CHANGED    Details   ondansetron (ZOFRAN ODT) 4 MG disintegrating tablet Take 1 tablet by mouth every 8 hours as needed for Nausea or Vomiting, Disp-8 tablet, R-0Print      atenolol (TENORMIN) 25 MG tablet Take 1 tablet by mouth daily. , Disp-30 tablet, R-3      amLODIPine (NORVASC) 10 MG tablet Take 10 mg by mouth daily. simvastatin (ZOCOR) 20 MG tablet Take 20 mg by mouth nightly.       NASAL SALINE NA by Nasal route as needed       meclizine (ANTIVERT) 25 MG tablet Take 25 mg by mouth 3 times daily as needed. fluticasone (FLONASE) 50 MCG/ACT nasal spray 1 spray by Nasal route as needed       omeprazole (PRILOSEC) 20 MG capsule Take 20 mg by mouth daily. levothyroxine (SYNTHROID) 100 MCG tablet Take 100 mcg by mouth daily. aspirin 81 MG EC tablet Take 81 mg by mouth daily. ALLERGIES:    Avelox [moxifloxacin hydrochloride], Benadryl [diphenhydramine hcl], Biaxin [clarithromycin], Cefuroxime, Codeine, Doxycycline, Morphine, Norgesic [orphenadrine-aspirin-caffeine], Opium, Penicillins, Promethazine, Propoxyphene, Sulfa antibiotics, Tetracyclines & related, and Tramadol    FAMILY HISTORY:     History reviewed. No pertinent family history. SOCIAL HISTORY:       Social History     Socioeconomic History    Marital status:      Spouse name: None    Number of children: None    Years of education: None    Highest education level: None   Occupational History    None   Tobacco Use    Smoking status: Never Smoker    Smokeless tobacco: Never Used   Vaping Use    Vaping Use: Never assessed   Substance and Sexual Activity    Alcohol use: No    Drug use: No    Sexual activity: Never   Other Topics Concern    None   Social History Narrative    None     Social Determinants of Health     Financial Resource Strain:     Difficulty of Paying Living Expenses:    Food Insecurity:     Worried About Running Out of Food in the Last Year:     Ran Out of Food in the Last Year:    Transportation Needs:     Lack of Transportation (Medical):      Lack of Transportation (Non-Medical):    Physical Activity:     Days of Exercise per Week:     Minutes of Exercise per Session:    Stress:     Feeling of Stress :    Social Connections:     Frequency of Communication with Friends and Family:     Frequency of Social Gatherings with Friends and Family:     Attends Taoist Services:     Active Member of Clubs or Organizations:     Attends Club or Organization Meetings:     Marital Status:    Intimate Partner Violence:     Fear of Current or Ex-Partner:     Emotionally Abused:     Physically Abused:     Sexually Abused:        SCREENINGS:             PHYSICAL EXAM:       ED Triage Vitals   BP Temp Temp Source Pulse Resp SpO2 Height Weight   11/01/21 1013 11/01/21 1013 11/01/21 1013 11/01/21 1013 11/01/21 1013 11/01/21 1013 11/01/21 1010 11/01/21 1010   (!) 150/64 98.1 °F (36.7 °C) Oral 74 20 96 % 5' (1.524 m) 128 lb (58.1 kg)       Physical Exam    CONSTITUTIONAL: Awake and alert. Cooperative. Well-developed. Well-nourished. Non-toxic. No acute distress. HENT: Normocephalic. Atraumatic. External ears normal, without discharge. No nasal discharge. Oropharynx clear. Mucous membranes moist.  EYES: Conjunctiva non-injected. No scleral icterus. PERRL. EOM's grossly intact. NECK: Supple. Normal ROM. CARDIOVASCULAR: RRR. No Murmer. Intact distal pulses. PULMONARY/CHEST WALL: Effort normal. No tachypnea. Lungs clear to ausculation. ABDOMEN: Normal BS. Soft. Nondistended. No tenderness to palpate. No guarding. /ANORECTAL: Not assessed  MUSKULOSKELETAL: Normal ROM. No acute deformities. No edema. No tenderness to palpate. SKIN: Warm and dry. No rash. NEUROLOGICAL: Alert and oriented x 3. GCS 15. CN II-XII grossly intact. Strength is 5/5 in all extremities and sensation is intact. Normal gait.    PSYCHIATRIC: Normal affect        DIAGNOSTICRESULTS:     LABS:    Results for orders placed or performed during the hospital encounter of 11/01/21   SARS-CoV-2 NAAT (Rapid)    Specimen: Nasopharyngeal Swab; Throat   Result Value Ref Range    SARS-CoV-2, NAAT Not Detected Not Detected   Rapid influenza A/B antigens    Specimen: Nasopharyngeal; Nose   Result Value Ref Range    Rapid Influenza A Ag Negative Negative    Rapid Influenza B Ag Negative Negative   CBC Auto Differential   Result Value Ref stable throughout her time here. She looks well. Again she received Tylenol orally and based on her work-up I will start her on an antibiotic given the read of possible left basilar opacity. She unfortunately has approximately 15 drug allergies including allergies to nearly every class of antibiotics. I reviewed records and when she was hospitalized about 2 years ago with pneumonia, she was given Zithromax at that time. For that reason, this is what we will order today. She received a 500 mg dose orally in the ED and was observed for short period of time. She had no adverse reaction. She will be discharged on Zithromax. I recommended she call her PCP back. Apparently she called them earlier today, it sounded like they were going to have availability to see her this afternoon, but she came here instead. I recommended she follow-up with primary care before the end of the weekend did tell her to come back if her symptoms worsened. No indication for admission at this time. I estimate there is LOW risk for SEPSIS, ACUTE RESPIRATORY FAILURE, PNEUMOTHORAX, PULMONARY EMBOLISM, ACUTE CORONARY SYNDROME, OR THORACIC AORTIC DISSECTION, thus I consider the discharge disposition reasonable. Kandice Cleary and I have discussed the diagnosis and risks, and we agree with discharging home to follow-up with their primary doctor. We also discussed returning to the Emergency Department immediately if new or worsening symptoms occur. We have discussed the symptoms which are most concerning (e.g., bloody sputum, fever, worsening pain or shortness of breath, vomiting) that necessitate immediate return. FINAL IMPRESSION:      1. Pneumonia of left lung due to infectious organism, unspecified part of lung    2.  Chills          DISPOSITION/PLAN:   DISPOSITION     DISCHARGE    PATIENT REFERRED TO:  Tere Aldrich MD  [address] [place] [id number]  [phone]    Call today  Schedule follow-up for later this

## 2021-11-04 ENCOUNTER — APPOINTMENT (OUTPATIENT)
Dept: GENERAL RADIOLOGY | Age: 86
End: 2021-11-04
Payer: MEDICARE

## 2021-11-04 ENCOUNTER — HOSPITAL ENCOUNTER (OUTPATIENT)
Age: 86
Setting detail: OBSERVATION
Discharge: HOME | End: 2021-11-05
Attending: EMERGENCY MEDICINE | Admitting: INTERNAL MEDICINE
Payer: MEDICARE

## 2021-11-04 ENCOUNTER — APPOINTMENT (OUTPATIENT)
Dept: CT IMAGING | Age: 86
End: 2021-11-04
Payer: MEDICARE

## 2021-11-04 DIAGNOSIS — R68.83 CHILLS: ICD-10-CM

## 2021-11-04 DIAGNOSIS — R53.1 GENERALIZED WEAKNESS: Primary | ICD-10-CM

## 2021-11-04 DIAGNOSIS — E87.1 HYPONATREMIA: ICD-10-CM

## 2021-11-04 DIAGNOSIS — D64.9 ANEMIA, UNSPECIFIED TYPE: ICD-10-CM

## 2021-11-04 DIAGNOSIS — N30.00 ACUTE CYSTITIS WITHOUT HEMATURIA: ICD-10-CM

## 2021-11-04 DIAGNOSIS — B37.31 YEAST INFECTION OF THE VAGINA: ICD-10-CM

## 2021-11-04 LAB
A/G RATIO: 1.3 (ref 1.1–2.2)
ALBUMIN SERPL-MCNC: 4.2 G/DL (ref 3.4–5)
ALP BLD-CCNC: 93 U/L (ref 40–129)
ALT SERPL-CCNC: 14 U/L (ref 10–40)
ANION GAP SERPL CALCULATED.3IONS-SCNC: 12 MMOL/L (ref 3–16)
AST SERPL-CCNC: 22 U/L (ref 15–37)
BACTERIA: ABNORMAL /HPF
BASOPHILS ABSOLUTE: 0.1 K/UL (ref 0–0.2)
BASOPHILS RELATIVE PERCENT: 1.2 %
BILIRUB SERPL-MCNC: <0.2 MG/DL (ref 0–1)
BILIRUBIN URINE: NEGATIVE
BLOOD, URINE: NEGATIVE
BUN BLDV-MCNC: 25 MG/DL (ref 7–20)
CALCIUM SERPL-MCNC: 8.9 MG/DL (ref 8.3–10.6)
CHLORIDE BLD-SCNC: 94 MMOL/L (ref 99–110)
CLARITY: CLEAR
CO2: 24 MMOL/L (ref 21–32)
COLOR: ABNORMAL
CREAT SERPL-MCNC: 1.1 MG/DL (ref 0.6–1.2)
EOSINOPHILS ABSOLUTE: 0.1 K/UL (ref 0–0.6)
EOSINOPHILS RELATIVE PERCENT: 1.3 %
EPITHELIAL CELLS, UA: ABNORMAL /HPF (ref 0–5)
GFR AFRICAN AMERICAN: 56
GFR NON-AFRICAN AMERICAN: 46
GLUCOSE BLD-MCNC: 106 MG/DL (ref 70–99)
GLUCOSE URINE: NEGATIVE MG/DL
HCT VFR BLD CALC: 28.2 % (ref 36–48)
HEMOGLOBIN: 10 G/DL (ref 12–16)
KETONES, URINE: NEGATIVE MG/DL
LACTIC ACID: 1.4 MMOL/L (ref 0.4–2)
LEUKOCYTE ESTERASE, URINE: ABNORMAL
LYMPHOCYTES ABSOLUTE: 0.9 K/UL (ref 1–5.1)
LYMPHOCYTES RELATIVE PERCENT: 19 %
MCH RBC QN AUTO: 32.5 PG (ref 26–34)
MCHC RBC AUTO-ENTMCNC: 35.4 G/DL (ref 31–36)
MCV RBC AUTO: 91.9 FL (ref 80–100)
MICROSCOPIC EXAMINATION: YES
MONOCYTES ABSOLUTE: 0.4 K/UL (ref 0–1.3)
MONOCYTES RELATIVE PERCENT: 8.1 %
NEUTROPHILS ABSOLUTE: 3.3 K/UL (ref 1.7–7.7)
NEUTROPHILS RELATIVE PERCENT: 70.4 %
NITRITE, URINE: NEGATIVE
OCCULT BLOOD SCREENING: NORMAL
PDW BLD-RTO: 13.1 % (ref 12.4–15.4)
PH UA: 6 (ref 5–8)
PLATELET # BLD: 161 K/UL (ref 135–450)
PMV BLD AUTO: 8 FL (ref 5–10.5)
POTASSIUM REFLEX MAGNESIUM: 3.7 MMOL/L (ref 3.5–5.1)
PROTEIN UA: NEGATIVE MG/DL
RAPID INFLUENZA  B AGN: NEGATIVE
RAPID INFLUENZA A AGN: NEGATIVE
RBC # BLD: 3.07 M/UL (ref 4–5.2)
RBC UA: ABNORMAL /HPF (ref 0–4)
SARS-COV-2, NAAT: NOT DETECTED
SODIUM BLD-SCNC: 130 MMOL/L (ref 136–145)
SPECIFIC GRAVITY UA: 1.01 (ref 1–1.03)
TOTAL PROTEIN: 7.4 G/DL (ref 6.4–8.2)
TROPONIN: <0.01 NG/ML
URINE REFLEX TO CULTURE: ABNORMAL
URINE TYPE: ABNORMAL
UROBILINOGEN, URINE: 0.2 E.U./DL
WBC # BLD: 4.6 K/UL (ref 4–11)
WBC UA: ABNORMAL /HPF (ref 0–5)
YEAST: PRESENT /HPF

## 2021-11-04 PROCEDURE — 87040 BLOOD CULTURE FOR BACTERIA: CPT

## 2021-11-04 PROCEDURE — 2580000003 HC RX 258: Performed by: NURSE PRACTITIONER

## 2021-11-04 PROCEDURE — 99285 EMERGENCY DEPT VISIT HI MDM: CPT

## 2021-11-04 PROCEDURE — 87804 INFLUENZA ASSAY W/OPTIC: CPT

## 2021-11-04 PROCEDURE — 87635 SARS-COV-2 COVID-19 AMP PRB: CPT

## 2021-11-04 PROCEDURE — 80053 COMPREHEN METABOLIC PANEL: CPT

## 2021-11-04 PROCEDURE — 81001 URINALYSIS AUTO W/SCOPE: CPT

## 2021-11-04 PROCEDURE — 82270 OCCULT BLOOD FECES: CPT

## 2021-11-04 PROCEDURE — 6370000000 HC RX 637 (ALT 250 FOR IP): Performed by: NURSE PRACTITIONER

## 2021-11-04 PROCEDURE — 83605 ASSAY OF LACTIC ACID: CPT

## 2021-11-04 PROCEDURE — 6360000004 HC RX CONTRAST MEDICATION: Performed by: NURSE PRACTITIONER

## 2021-11-04 PROCEDURE — 36415 COLL VENOUS BLD VENIPUNCTURE: CPT

## 2021-11-04 PROCEDURE — 85025 COMPLETE CBC W/AUTO DIFF WBC: CPT

## 2021-11-04 PROCEDURE — 96360 HYDRATION IV INFUSION INIT: CPT

## 2021-11-04 PROCEDURE — 71045 X-RAY EXAM CHEST 1 VIEW: CPT

## 2021-11-04 PROCEDURE — 93005 ELECTROCARDIOGRAM TRACING: CPT | Performed by: NURSE PRACTITIONER

## 2021-11-04 PROCEDURE — 84484 ASSAY OF TROPONIN QUANT: CPT

## 2021-11-04 PROCEDURE — 84145 PROCALCITONIN (PCT): CPT

## 2021-11-04 PROCEDURE — 71260 CT THORAX DX C+: CPT

## 2021-11-04 PROCEDURE — 87086 URINE CULTURE/COLONY COUNT: CPT

## 2021-11-04 RX ORDER — 0.9 % SODIUM CHLORIDE 0.9 %
1000 INTRAVENOUS SOLUTION INTRAVENOUS ONCE
Status: COMPLETED | OUTPATIENT
Start: 2021-11-04 | End: 2021-11-04

## 2021-11-04 RX ORDER — FLUCONAZOLE 100 MG/1
200 TABLET ORAL ONCE
Status: COMPLETED | OUTPATIENT
Start: 2021-11-04 | End: 2021-11-04

## 2021-11-04 RX ORDER — ACETAMINOPHEN 500 MG
1000 TABLET ORAL ONCE
Status: COMPLETED | OUTPATIENT
Start: 2021-11-04 | End: 2021-11-04

## 2021-11-04 RX ADMIN — FLUCONAZOLE 200 MG: 100 TABLET ORAL at 21:05

## 2021-11-04 RX ADMIN — IOPAMIDOL 75 ML: 755 INJECTION, SOLUTION INTRAVENOUS at 19:19

## 2021-11-04 RX ADMIN — ACETAMINOPHEN 1000 MG: 500 TABLET ORAL at 18:22

## 2021-11-04 RX ADMIN — SODIUM CHLORIDE 1000 ML: 9 INJECTION, SOLUTION INTRAVENOUS at 18:22

## 2021-11-04 ASSESSMENT — PAIN SCALES - GENERAL
PAINLEVEL_OUTOF10: 4
PAINLEVEL_OUTOF10: 3

## 2021-11-04 NOTE — ED PROVIDER NOTES
[facility] Emergency Department    CHIEF COMPLAINT  Chills (x 3 weeks, pt evaluated monday, has pneumonia, is not feeling better)      HISTORY OF PRESENT ILLNESS  Debbi Bhandari is a 80 y.o. female who presents to the ED complaining of generalized weakness, fatigue, body aches, chills, cough for approximately 1 week. Patient reports she was evaluated in the emergency department on Monday and diagnosed with pneumonia she has been taking azithromycin as prescribed and reports that she is not feeling any better. Patient reports today she was so weak and \"shaky\" she was scared that she might \"pass out. \"  Patient denies dizziness, syncope, palpitations, numbness, tingling, extremity weakness, abdominal pain, nausea, vomiting, diarrhea, urinary complaints, flank pain, leg swelling or calf pain. Patient did take Tylenol earlier today for her symptoms with little to no relief. Patient denies measurable fever at home. No other complaints, modifying factors or associated symptoms. Nursing notes reviewed.    Past Medical History:   Diagnosis Date    Amaurosis fugax of left eye 3/15/2011    Benign neoplasm     colon    Cancer of kidney ([facility] [address] 11/03/2010    Right    Colonic polyp 1999    Cyst of ovary, left 9/2/2012    Dehydration 2/9/2011    Diverticulosis of colon 1999    Dyslipidemia 2008    GERD (gastroesophageal reflux disease)     Glaucoma     Hyperlipidemia     Hypertension 1960    IBS (irritable bowel syndrome)     Osteoporosis     Other specified gastritis without mention of hemorrhage 1998    Pericardial effusion 8/23/2011     Past Surgical History:   Procedure Laterality Date    BRONCHOSCOPY N/A 11/04/2019    BRONCHOSCOPY N/A 11/4/2019    BRONCHOSCOPY ALVEOLAR LAVAGE performed by Karlos Kay MD at [address] [place] [address]  11/4/2019    BRONCHOSCOPY THERAPUTIC ASPIRATION INITIAL performed by Karlos Kay MD at [facility] CHOLECYSTECTOMY, LAPAROSCOPIC  02/11/1998    COLONOSCOPY  08/24/2009    TA & Diverticulosis    COLONOSCOPY  07/17/2001    diverticulosis    COLONOSCOPY  08/29/2006    diverticulosis    PARTIAL NEPHRECTOMY  11/03/2010    Right    UPPER GASTROINTESTINAL ENDOSCOPY  02/11/1998    chronic gastritis    UPPER GASTROINTESTINAL ENDOSCOPY  09/10/1999    chronic gastritis    UPPER GASTROINTESTINAL ENDOSCOPY  07/17/2001    chronic gastritis w intestinal metaplasia    UPPER GASTROINTESTINAL ENDOSCOPY  02/03/2004    chronic gastritis w intestinal metaplasia    UPPER GASTROINTESTINAL ENDOSCOPY  11/29/2007    gastritis     No family history on file. Social History     Socioeconomic History    Marital status:      Spouse name: Not on file    Number of children: Not on file    Years of education: Not on file    Highest education level: Not on file   Occupational History    Not on file   Tobacco Use    Smoking status: Never Smoker    Smokeless tobacco: Never Used   Vaping Use    Vaping Use: Never assessed   Substance and Sexual Activity    Alcohol use: No    Drug use: No    Sexual activity: Never   Other Topics Concern    Not on file   Social History Narrative    Not on file     Social Determinants of Health     Financial Resource Strain:     Difficulty of Paying Living Expenses:    Food Insecurity:     Worried About Running Out of Food in the Last Year:     [address] in the Last Year:    Transportation Needs:     Lack of Transportation (Medical):      Lack of Transportation (Non-Medical):    Physical Activity:     Days of Exercise per Week:     Minutes of Exercise per Session:    Stress:     Feeling of Stress :    Social Connections:     Frequency of Communication with Friends and Family:     Frequency of Social Gatherings with Friends and Family:     Attends Muslim Services:     Active Member of Clubs or Organizations:     Attends Club or Organization Meetings:     Marital Status: Intimate Partner Violence:     Fear of Current or Ex-Partner:     Emotionally Abused:     Physically Abused:     Sexually Abused:      Current Facility-Administered Medications   Medication Dose Route Frequency Provider Last Rate Last Admin    0.9 % sodium chloride bolus  1,000 mL IntraVENous Once GERRY Adan CNP 1,000 mL/hr at 11/04/21 1822 1,000 mL at 11/04/21 1822     Current Outpatient Medications   Medication Sig Dispense Refill    azithromycin (ZITHROMAX Z-HEMANT) 250 MG tablet See admin instructions 1 packet 0    ondansetron (ZOFRAN ODT) 4 MG disintegrating tablet Take 1 tablet by mouth every 8 hours as needed for Nausea or Vomiting 8 tablet 0    atenolol (TENORMIN) 25 MG tablet Take 1 tablet by mouth daily. 30 tablet 3    amLODIPine (NORVASC) 10 MG tablet Take 10 mg by mouth daily.  simvastatin (ZOCOR) 20 MG tablet Take 20 mg by mouth nightly.  NASAL SALINE NA by Nasal route as needed       meclizine (ANTIVERT) 25 MG tablet Take 25 mg by mouth 3 times daily as needed.  fluticasone (FLONASE) 50 MCG/ACT nasal spray 1 spray by Nasal route as needed       omeprazole (PRILOSEC) 20 MG capsule Take 20 mg by mouth daily.  levothyroxine (SYNTHROID) 100 MCG tablet Take 100 mcg by mouth daily.  aspirin 81 MG EC tablet Take 81 mg by mouth daily.        Allergies   Allergen Reactions    Avelox [Moxifloxacin Hydrochloride]     Benadryl [Diphenhydramine Hcl]     Biaxin [Clarithromycin]     Cefuroxime     Codeine     Doxycycline     Morphine     Norgesic [Orphenadrine-Aspirin-Caffeine]     Opium     Penicillins     Promethazine     Propoxyphene     Sulfa Antibiotics     Tetracyclines & Related     Tramadol        REVIEW OF SYSTEMS  10 systems reviewed, pertinent positives per HPI otherwise noted to be negative    PHYSICAL EXAM  BP (!) 159/88   Pulse 75   Temp 97.8 °F (36.6 °C) (Oral)   Resp 17   Wt 126 lb (57.2 kg)   SpO2 95%   BMI 24.61 kg/m²   GENERAL APPEARANCE: Awake and alert. Cooperative. No acute distress. Vital signs are stable. Well appearing and non toxic. HEAD: Normocephalic. Atraumatic. EYES: PERRL. EOM's grossly intact. ENT: Mucous membranes are moist.   NECK: Supple. Normal ROM. HEART: RRR. Distal pulses are equal and intact. Cap refill less than 2 seconds. LUNGS: Respirations unlabored. Lung sounds rhonchorous in the left lung fields. No expiratory wheezing or stridor. Good air exchange. Speaking comfortably in full sentences. ABDOMEN: Soft. Non-distended. Non-tender. No guarding or rebound. No rigidity. Bowel sounds are present. Negative shane's. Negative McBurney's point. Negative CVA tenderness. EXTREMITIES: No peripheral edema. Moves all extremities equally. All extremities neurovascularly intact. SKIN: Warm and dry. No acute rashes. NEUROLOGICAL: Alert and oriented. No gross facial drooping. Strength 5/5, sensation intact. PSYCHIATRIC: Normal mood and affect. SCREENINGS       RADIOLOGY  XR CHEST PORTABLE    Result Date: 11/4/2021  EXAMINATION: ONE XRAY VIEW OF THE CHEST 11/4/2021 5:51 pm COMPARISON: 11/01/2021 HISTORY: ORDERING SYSTEM PROVIDED HISTORY: pneumonia, symptoms unchaged after abx started monday TECHNOLOGIST PROVIDED HISTORY: Reason for exam:->pneumonia, symptoms unchaged after abx started monday Reason for Exam: f/u pnemonia Acuity: Acute Type of Exam: Initial FINDINGS: The patient is rotated. The cardiac silhouette is enlarged. Pulmonary vessels are normal in caliber. There is scalloping of the right hemidiaphragm. Calcified granuloma present. There is mild fibrosis in the upper lungs. There is mild linear/patchy opacity in the left base. Bilateral moderate osteoarthritis of the shoulders. Mild left basilar atelectasis versus airspace disease, not significantly changed.      CT CHEST ABDOMEN PELVIS W CONTRAST    Result Date: 11/4/2021  EXAMINATION: CT OF THE CHEST, ABDOMEN, AND PELVIS WITH CONTRAST 11/4/2021 6:51 pm TECHNIQUE: CT of the chest, abdomen and pelvis was performed with the administration of intravenous contrast. Multiplanar reformatted images are provided for review. Dose modulation, iterative reconstruction, and/or weight based adjustment of the mA/kV was utilized to reduce the radiation dose to as low as reasonably achievable. COMPARISON: CT abdomen pelvis, 07/13/2021 CT chest, 11/02/2019 HISTORY: ORDERING SYSTEM PROVIDED HISTORY: cough, chills, body aches, no appetite, weakness TECHNOLOGIST PROVIDED HISTORY: Reason for exam:->cough, chills, body aches, no appetite, weakness Additional Contrast?->None Decision Support Exception - unselect if not a suspected or confirmed emergency medical condition->Emergency Medical Condition (MA) Reason for Exam: pneumonia monday, now feeling even worse Acuity: Acute Type of Exam: Initial Relevant Medical/Surgical History: partial rt nephrectomy, gb FINDINGS: Chest: Mediastinum: There is a moderate-sized pericardial effusion, measuring 1.9 cm in thickness posteriorly. Cardiomegaly is noted. Thoracic aorta is tortuous with moderate calcification. There is no aneurysm. There is no mediastinal or hilar adenopathy. Lungs/pleura: There are small bilateral pleural effusions. There is stable pleuroparenchymal scarring in the apices, greater on the right. There is mild left basilar atelectasis. No consolidation is identified. Soft Tissues/Bones: No acute osseous abnormality is appreciated. The chest wall is unremarkable. Abdomen/Pelvis: Organs: The liver is homogeneous and normal in attenuation. There is post cholecystectomy prominence of the common duct. The pancreas, spleen and adrenal glands are unremarkable. There is a stable cortical defect in the posterior interpolar region of the kidney, consistent with previous intervention. No mass or hydronephrosis is identified. GI/Bowel: There is a small hiatal hernia. The stomach is nondistended.   No dilated loops of small bowel are identified. There is mild to moderate prominence of stool throughout the colon. There is diverticulosis of the sigmoid colon without acute inflammatory changes. There is normal enhancement of the mesenteric vasculature. Pelvis: Urinary bladder and uterus are unremarkable. Peritoneum/Retroperitoneum: There is severe aortoiliac calcification, without aneurysm. No adenopathy. Bones/Soft Tissues: No acute osseous abnormality. Abdominal wall is unremarkable. Chest- Moderate-sized pericardial effusion, persistent since 07/13/2021. Small bilateral pleural effusions. Cardiomegaly. Mild left basilar atelectasis. --- Abdomen pelvis- No acute abdominopelvic abnormality. Mild-to-moderate prominence of stool in the colon, which may correlate with constipation. ED COURSE/MDM  Patient seen and evaluated. Old records reviewed. Diagnostic testing reviewed and results discussed. I have seen this patient in collaboration with supervising physician Dr. Alexander Cedillo. We thoroughly discussed the history, physical exam, diagnostic testing and emergency department course. [facility] presented to the ED today with above noted complaints. Emergency department course; urinalysis does show trace amount of leukocytes with 6-10 WBCs and yeast present    CBC notable for a drop in hemoglobin and hematocrit compared to this previous Monday from 13 to 10. Occult stool obtained and is negative. Rapid Covid and rapid influenza were both negative. Kidney function at baseline. Mild hyponatremia at 130. EKG interpreted by my attending physician. Troponin less than 0.01    CT chest abdomen and pelvis shows pericardial effusion which is persistent since previous scans. Small bilateral pleural effusions. Cardiomegaly. Abdomen and pelvis shows no acute abdominal pelvic abnormality.     Given patient's persistent symptoms we did feel patient warranted hospital admission for further evaluation and management. While in ED patient received   Medications   0.9 % sodium chloride bolus (0 mLs IntraVENous Stopped 11/4/21 1931)   acetaminophen (TYLENOL) tablet 1,000 mg (1,000 mg Oral Given 11/4/21 1822)   iopamidol (ISOVUE-370) 76 % injection 75 mL (75 mLs IntraVENous Given 11/4/21 1919)   fluconazole (DIFLUCAN) tablet 200 mg (200 mg Oral Given 11/4/21 2105)         A discussion was had with the patient and/or their surrogate regarding diagnosis, diagnostic testing results, treatment/ plan of care. There was shared decision-making between myself as well as the patient and/or their surrogate and we are all in agreement with hospital admission. There was an opportunity for questions and all questions were answered to the best of my ability and to the satisfaction of the patient and/or patient family.      Results for orders placed or performed during the hospital encounter of 11/04/21   COVID-19, Rapid    Specimen: Nasopharyngeal Swab   Result Value Ref Range    SARS-CoV-2, NAAT Not Detected Not Detected   Rapid influenza A/B antigens    Specimen: Nasopharyngeal   Result Value Ref Range    Rapid Influenza A Ag Negative Negative    Rapid Influenza B Ag Negative Negative   CBC auto differential   Result Value Ref Range    WBC 4.6 4.0 - 11.0 K/uL    RBC 3.07 (L) 4.00 - 5.20 M/uL    Hemoglobin 10.0 (L) 12.0 - 16.0 g/dL    Hematocrit 28.2 (L) 36.0 - 48.0 %    MCV 91.9 80.0 - 100.0 fL    MCH 32.5 26.0 - 34.0 pg    MCHC 35.4 31.0 - 36.0 g/dL    RDW 13.1 12.4 - 15.4 %    Platelets 265 157 - 601 K/uL    MPV 8.0 5.0 - 10.5 fL    Neutrophils % 70.4 %    Lymphocytes % 19.0 %    Monocytes % 8.1 %    Eosinophils % 1.3 %    Basophils % 1.2 %    Neutrophils Absolute 3.3 1.7 - 7.7 K/uL    Lymphocytes Absolute 0.9 (L) 1.0 - 5.1 K/uL    Monocytes Absolute 0.4 0.0 - 1.3 K/uL    Eosinophils Absolute 0.1 0.0 - 0.6 K/uL    Basophils Absolute 0.1 0.0 - 0.2 K/uL   Comprehensive Metabolic Panel w/ Reflex to MG   Result Value Ref Range Sodium 130 (L) 136 - 145 mmol/L    Potassium reflex Magnesium 3.7 3.5 - 5.1 mmol/L    Chloride 94 (L) 99 - 110 mmol/L    CO2 24 21 - 32 mmol/L    Anion Gap 12 3 - 16    Glucose 106 (H) 70 - 99 mg/dL    BUN 25 (H) 7 - 20 mg/dL    CREATININE 1.1 0.6 - 1.2 mg/dL    GFR Non- 46 (A) >60    GFR  56 (A) >60    Calcium 8.9 8.3 - 10.6 mg/dL    Total Protein 7.4 6.4 - 8.2 g/dL    Albumin 4.2 3.4 - 5.0 g/dL    Albumin/Globulin Ratio 1.3 1.1 - 2.2    Total Bilirubin <0.2 0.0 - 1.0 mg/dL    Alkaline Phosphatase 93 40 - 129 U/L    ALT 14 10 - 40 U/L    AST 22 15 - 37 U/L   Troponin   Result Value Ref Range    Troponin <0.01 <0.01 ng/mL   Lactic acid, plasma   Result Value Ref Range    Lactic Acid 1.4 0.4 - 2.0 mmol/L   Urine, reflex to culture    Specimen: Urine, clean catch   Result Value Ref Range    Color, UA Straw Straw/Yellow    Clarity, UA Clear Clear    Glucose, Ur Negative Negative mg/dL    Bilirubin Urine Negative Negative    Ketones, Urine Negative Negative mg/dL    Specific Gravity, UA 1.010 1.005 - 1.030    Blood, Urine Negative Negative    pH, UA 6.0 5.0 - 8.0    Protein, UA Negative Negative mg/dL    Urobilinogen, Urine 0.2 <2.0 E.U./dL    Nitrite, Urine Negative Negative    Leukocyte Esterase, Urine TRACE (A) Negative    Microscopic Examination YES     Urine Type NotGiven     Urine Reflex to Culture Not Indicated    Microscopic Urinalysis   Result Value Ref Range    WBC, UA 6-9 (A) 0 - 5 /HPF    RBC, UA None seen 0 - 4 /HPF    Epithelial Cells, UA 6-10 (A) 0 - 5 /HPF    Bacteria, UA Rare (A) None Seen /HPF    Yeast, UA Present (A) None Seen /HPF   Blood Occult Stool Screen #1   Result Value Ref Range    Occult Blood Screening Result: Negative  Normal range: Negative      EKG 12 Lead   Result Value Ref Range    Ventricular Rate 70 BPM    Atrial Rate 70 BPM    P-R Interval 156 ms    QRS Duration 80 ms    Q-T Interval 380 ms    QTc Calculation (Bazett) 410 ms    P Axis 23 degrees    R

## 2021-11-05 VITALS
TEMPERATURE: 98.2 F | WEIGHT: 126 LBS | BODY MASS INDEX: 24.74 KG/M2 | HEART RATE: 69 BPM | OXYGEN SATURATION: 93 % | SYSTOLIC BLOOD PRESSURE: 144 MMHG | RESPIRATION RATE: 18 BRPM | DIASTOLIC BLOOD PRESSURE: 69 MMHG | HEIGHT: 60 IN

## 2021-11-05 PROBLEM — E03.9 HYPOTHYROIDISM: Status: ACTIVE | Noted: 2021-11-05

## 2021-11-05 LAB
ANION GAP SERPL CALCULATED.3IONS-SCNC: 11 MMOL/L (ref 3–16)
BASOPHILS ABSOLUTE: 0 K/UL (ref 0–0.2)
BASOPHILS RELATIVE PERCENT: 0.9 %
BUN BLDV-MCNC: 19 MG/DL (ref 7–20)
CALCIUM SERPL-MCNC: 8.8 MG/DL (ref 8.3–10.6)
CHLORIDE BLD-SCNC: 100 MMOL/L (ref 99–110)
CO2: 24 MMOL/L (ref 21–32)
CREAT SERPL-MCNC: 1 MG/DL (ref 0.6–1.2)
EKG ATRIAL RATE: 70 BPM
EKG DIAGNOSIS: NORMAL
EKG P AXIS: 23 DEGREES
EKG P-R INTERVAL: 156 MS
EKG Q-T INTERVAL: 380 MS
EKG QRS DURATION: 80 MS
EKG QTC CALCULATION (BAZETT): 410 MS
EKG R AXIS: 41 DEGREES
EKG T AXIS: 78 DEGREES
EKG VENTRICULAR RATE: 70 BPM
EOSINOPHILS ABSOLUTE: 0.1 K/UL (ref 0–0.6)
EOSINOPHILS RELATIVE PERCENT: 2 %
GFR AFRICAN AMERICAN: >60
GFR NON-AFRICAN AMERICAN: 51
GLUCOSE BLD-MCNC: 98 MG/DL (ref 70–99)
HCT VFR BLD CALC: 34.5 % (ref 36–48)
HEMOGLOBIN: 11.9 G/DL (ref 12–16)
LYMPHOCYTES ABSOLUTE: 1.1 K/UL (ref 1–5.1)
LYMPHOCYTES RELATIVE PERCENT: 22.4 %
MCH RBC QN AUTO: 31.7 PG (ref 26–34)
MCHC RBC AUTO-ENTMCNC: 34.4 G/DL (ref 31–36)
MCV RBC AUTO: 92.1 FL (ref 80–100)
MONOCYTES ABSOLUTE: 0.4 K/UL (ref 0–1.3)
MONOCYTES RELATIVE PERCENT: 7.7 %
NEUTROPHILS ABSOLUTE: 3.2 K/UL (ref 1.7–7.7)
NEUTROPHILS RELATIVE PERCENT: 67 %
PDW BLD-RTO: 13 % (ref 12.4–15.4)
PLATELET # BLD: 188 K/UL (ref 135–450)
PMV BLD AUTO: 7.7 FL (ref 5–10.5)
POTASSIUM SERPL-SCNC: 3.7 MMOL/L (ref 3.5–5.1)
PROCALCITONIN: 0.05 NG/ML (ref 0–0.15)
PROCALCITONIN: 0.06 NG/ML (ref 0–0.15)
RBC # BLD: 3.75 M/UL (ref 4–5.2)
SODIUM BLD-SCNC: 135 MMOL/L (ref 136–145)
TSH SERPL DL<=0.05 MIU/L-ACNC: 7.62 UIU/ML (ref 0.27–4.2)
WBC # BLD: 4.7 K/UL (ref 4–11)

## 2021-11-05 PROCEDURE — 36415 COLL VENOUS BLD VENIPUNCTURE: CPT

## 2021-11-05 PROCEDURE — 84443 ASSAY THYROID STIM HORMONE: CPT

## 2021-11-05 PROCEDURE — 6370000000 HC RX 637 (ALT 250 FOR IP): Performed by: INTERNAL MEDICINE

## 2021-11-05 PROCEDURE — 2580000003 HC RX 258: Performed by: INTERNAL MEDICINE

## 2021-11-05 PROCEDURE — 85025 COMPLETE CBC W/AUTO DIFF WBC: CPT

## 2021-11-05 PROCEDURE — 97530 THERAPEUTIC ACTIVITIES: CPT

## 2021-11-05 PROCEDURE — G0378 HOSPITAL OBSERVATION PER HR: HCPCS

## 2021-11-05 PROCEDURE — 93010 ELECTROCARDIOGRAM REPORT: CPT | Performed by: INTERNAL MEDICINE

## 2021-11-05 PROCEDURE — 97165 OT EVAL LOW COMPLEX 30 MIN: CPT

## 2021-11-05 PROCEDURE — 97161 PT EVAL LOW COMPLEX 20 MIN: CPT

## 2021-11-05 PROCEDURE — 84145 PROCALCITONIN (PCT): CPT

## 2021-11-05 PROCEDURE — 80048 BASIC METABOLIC PNL TOTAL CA: CPT

## 2021-11-05 PROCEDURE — 97535 SELF CARE MNGMENT TRAINING: CPT

## 2021-11-05 RX ORDER — ATENOLOL 25 MG/1
25 TABLET ORAL DAILY
Status: DISCONTINUED | OUTPATIENT
Start: 2021-11-05 | End: 2021-11-05 | Stop reason: HOSPADM

## 2021-11-05 RX ORDER — SODIUM CHLORIDE 0.9 % (FLUSH) 0.9 %
10 SYRINGE (ML) INJECTION PRN
Status: DISCONTINUED | OUTPATIENT
Start: 2021-11-05 | End: 2021-11-05 | Stop reason: HOSPADM

## 2021-11-05 RX ORDER — ONDANSETRON 2 MG/ML
4 INJECTION INTRAMUSCULAR; INTRAVENOUS EVERY 6 HOURS PRN
Status: DISCONTINUED | OUTPATIENT
Start: 2021-11-05 | End: 2021-11-05 | Stop reason: HOSPADM

## 2021-11-05 RX ORDER — POTASSIUM CHLORIDE 20 MEQ/1
40 TABLET, EXTENDED RELEASE ORAL PRN
Status: DISCONTINUED | OUTPATIENT
Start: 2021-11-05 | End: 2021-11-05

## 2021-11-05 RX ORDER — PANTOPRAZOLE SODIUM 40 MG/1
40 TABLET, DELAYED RELEASE ORAL
Status: DISCONTINUED | OUTPATIENT
Start: 2021-11-05 | End: 2021-11-05 | Stop reason: HOSPADM

## 2021-11-05 RX ORDER — ACETAMINOPHEN 325 MG/1
650 TABLET ORAL EVERY 6 HOURS PRN
Status: DISCONTINUED | OUTPATIENT
Start: 2021-11-05 | End: 2021-11-05 | Stop reason: HOSPADM

## 2021-11-05 RX ORDER — POTASSIUM CHLORIDE 7.45 MG/ML
10 INJECTION INTRAVENOUS PRN
Status: DISCONTINUED | OUTPATIENT
Start: 2021-11-05 | End: 2021-11-05

## 2021-11-05 RX ORDER — LEVOTHYROXINE SODIUM 0.12 MG/1
125 TABLET ORAL DAILY
Status: DISCONTINUED | OUTPATIENT
Start: 2021-11-05 | End: 2021-11-05 | Stop reason: HOSPADM

## 2021-11-05 RX ORDER — ONDANSETRON 4 MG/1
4 TABLET, ORALLY DISINTEGRATING ORAL EVERY 8 HOURS PRN
Status: DISCONTINUED | OUTPATIENT
Start: 2021-11-05 | End: 2021-11-05 | Stop reason: HOSPADM

## 2021-11-05 RX ORDER — ACETAMINOPHEN 650 MG/1
650 SUPPOSITORY RECTAL EVERY 6 HOURS PRN
Status: DISCONTINUED | OUTPATIENT
Start: 2021-11-05 | End: 2021-11-05 | Stop reason: HOSPADM

## 2021-11-05 RX ORDER — ASPIRIN 81 MG/1
81 TABLET ORAL DAILY
Status: DISCONTINUED | OUTPATIENT
Start: 2021-11-05 | End: 2021-11-05 | Stop reason: HOSPADM

## 2021-11-05 RX ORDER — SODIUM CHLORIDE 9 MG/ML
25 INJECTION, SOLUTION INTRAVENOUS PRN
Status: DISCONTINUED | OUTPATIENT
Start: 2021-11-05 | End: 2021-11-05 | Stop reason: HOSPADM

## 2021-11-05 RX ORDER — MAGNESIUM SULFATE 1 G/100ML
1000 INJECTION INTRAVENOUS PRN
Status: DISCONTINUED | OUTPATIENT
Start: 2021-11-05 | End: 2021-11-05

## 2021-11-05 RX ORDER — ATORVASTATIN CALCIUM 10 MG/1
10 TABLET, FILM COATED ORAL DAILY
Status: DISCONTINUED | OUTPATIENT
Start: 2021-11-05 | End: 2021-11-05 | Stop reason: HOSPADM

## 2021-11-05 RX ORDER — AMLODIPINE BESYLATE 5 MG/1
10 TABLET ORAL DAILY
Status: DISCONTINUED | OUTPATIENT
Start: 2021-11-05 | End: 2021-11-05 | Stop reason: HOSPADM

## 2021-11-05 RX ORDER — LANOLIN ALCOHOL/MO/W.PET/CERES
3 CREAM (GRAM) TOPICAL NIGHTLY PRN
Status: DISCONTINUED | OUTPATIENT
Start: 2021-11-05 | End: 2021-11-05 | Stop reason: HOSPADM

## 2021-11-05 ASSESSMENT — PAIN SCALES - GENERAL
PAINLEVEL_OUTOF10: 0

## 2021-11-05 NOTE — PROGRESS NOTES
NURSING ASSESSMENT: [address] [facility]    Patient:Sheyla Byrd     Rehab Dx/Hx: Chills [R68.83]  Hyponatremia [E87.1]  Yeast infection of the vagina [B37.3]  Generalized weakness [R53.1]  Acute cystitis without hematuria [N30.00]  Anemia, unspecified type [D64.9]   :1926  [facility]:[id number]  Date of Admit: 2021  Room #: 6078/0073-03    Subjective:   Patient admitted to room 353 from ED via W/C. Alert and oriented x4. Oriented to room and call light system. Oriented to rehab routine and therapy schedules. Informed about care conferences and ordering of meals with PCA. Drug / Medication Review:   Medications were reviewed by RN at time of admission  [x]  No potential or actual clinically significant medication issues were noted. []  Potential or actual clinically significant medication issues were found and MD was notified. 4 Eyes Skin Assessment   The patient is being assessed for: Admission     I agree that 2 RN's have performed a thorough Head to Toe Skin Assessment on the patient. ALL assessment sites listed below have been assessed. Areas assessed by both nurses:   [x]   Head, Face, and Ears   [x]   Shoulders, Back, and Chest, Abdomen  [x]   Arms, Elbows, and Hands   [x]   Coccyx, Sacrum, and Ischium  [x]   Legs, Feet, and Heel     Does the Patient have Skin Breakdown?   No         Anatoly Prevention initiated:  Yes  Wound Care Orders initiated:  Not Applicable      [facility] nurse consulted for Pressure Injury (Stage 3,4, Unstageable, DTI, NWPT, Complex wounds)and New or Established Ostomies:   Not Applicable    Primary Nurse eSignature: Cherylene Malta, RN  Co-signer eSignature:  Luli Faust RN

## 2021-11-05 NOTE — H&P
Hospital Medicine History & Physical      Patient:  Janine Martines  :   1926  MRN:   [id number]  Date of Service: 21    Chief Complaint   Patient presents with    Chills     x 3 weeks, pt evaluated monday, has pneumonia, is not feeling better       HISTORY OF PRESENT ILLNESS:    Janine Martines is a 80 y.o. female. She presents from home. She has felt generally ill for about a week. She relates having less energy than usual, subjective chills, sweats, and feeling weaker than usual.  All her complaints seem to be constitutional.  She has no focal complaints. She does relate that she fell striking the back of her head in her kitchen about a week ago. She did not lose consciousness. She also relates she had a kidney infection about 3 weeks ago. Review of Systems:  All pertinent positives and negatives are as noted in the HPI section. All other systems were reviewed and are negative.     Past Medical History:   Diagnosis Date    Amaurosis fugax of left eye 3/15/2011    Benign neoplasm     colon    Cancer of kidney ([facility] [address] 2010    Right    Colonic polyp     Cyst of ovary, left 2012    Dehydration 2011    Diverticulosis of colon     Dyslipidemia     GERD (gastroesophageal reflux disease)     Glaucoma     Hyperlipidemia     Hypertension 1960    IBS (irritable bowel syndrome)     Osteoporosis     Other specified gastritis without mention of hemorrhage     Pericardial effusion 2011       Past Surgical History:   Procedure Laterality Date    BRONCHOSCOPY N/A 2019    BRONCHOSCOPY N/A 2019    BRONCHOSCOPY ALVEOLAR LAVAGE performed by Nieves Morgan MD at [address]  2019    BRONCHOSCOPY THERAPUTIC ASPIRATION INITIAL performed by Nieves Morgan MD at [facility], [facility]  1998    COLONOSCOPY  2009    TA & Diverticulosis    COLONOSCOPY  2001 diverticulosis    COLONOSCOPY  08/29/2006    diverticulosis    PARTIAL NEPHRECTOMY  11/03/2010    Right    UPPER GASTROINTESTINAL ENDOSCOPY  02/11/1998    chronic gastritis    UPPER GASTROINTESTINAL ENDOSCOPY  09/10/1999    chronic gastritis    UPPER GASTROINTESTINAL ENDOSCOPY  07/17/2001    chronic gastritis w intestinal metaplasia    UPPER GASTROINTESTINAL ENDOSCOPY  02/03/2004    chronic gastritis w intestinal metaplasia    UPPER GASTROINTESTINAL ENDOSCOPY  11/29/2007    gastritis         Prior to Admission medications    Medication Sig Start Date End Date Taking? Authorizing Provider   azithromycin (ZITHROMAX Z-HEMANT) 250 MG tablet See admin instructions 11/1/21 11/11/21  DAVE Foster   ondansetron (ZOFRAN ODT) 4 MG disintegrating tablet Take 1 tablet by mouth every 8 hours as needed for Nausea or Vomiting 7/13/21   Chidi Chaudhry PA-C   atenolol (TENORMIN) 25 MG tablet Take 1 tablet by mouth daily. 8/7/14   Chel Singleton MD   amLODIPine (NORVASC) 10 MG tablet Take 10 mg by mouth daily. Historical Provider, MD   simvastatin (ZOCOR) 20 MG tablet Take 20 mg by mouth nightly. Historical Provider, MD   NASAL SALINE NA by Nasal route as needed     Historical Provider, MD   meclizine (ANTIVERT) 25 MG tablet Take 25 mg by mouth 3 times daily as needed. Historical Provider, MD   fluticasone (FLONASE) 50 MCG/ACT nasal spray 1 spray by Nasal route as needed     Historical Provider, MD   omeprazole (PRILOSEC) 20 MG capsule Take 20 mg by mouth daily. Historical Provider, MD   levothyroxine (SYNTHROID) 100 MCG tablet Take 100 mcg by mouth daily. Historical Provider, MD   aspirin 81 MG EC tablet Take 81 mg by mouth daily. Historical Provider, MD       Allergies:    Avelox [moxifloxacin hydrochloride], Benadryl [diphenhydramine hcl], Biaxin [clarithromycin], Cefuroxime, Codeine, Doxycycline, Morphine, Norgesic [orphenadrine-aspirin-caffeine], Opium, Penicillins, Promethazine, Propoxyphene, Sulfa antibiotics, Tetracyclines & related, and Tramadol    Social:   reports that she has never smoked. She has never used smokeless tobacco.   reports no history of alcohol use. Social History     Substance and Sexual Activity   Drug Use No       No family history on file. PHYSICAL EXAM:  I performed this physical examination. Vitals:  Patient Vitals for the past 24 hrs:   BP Temp Temp src Pulse Resp SpO2 Weight   11/04/21 2300 (!) 173/73   70 14 95 %    11/04/21 2200 (!) 156/66   65 13 94 %    11/04/21 2131 (!) 155/68   63 13 96 %    11/04/21 2100 (!) 144/66   66 13 94 %    11/04/21 2030 (!) 146/70   72 13 93 %    11/04/21 2000 (!) 143/68   73 12 94 %    11/04/21 1931 (!) 157/68   74 14 93 %    11/04/21 1906 (!) 137/99   77 15 97 %    11/04/21 1831 (!) 159/88   75 17 95 %    11/04/21 1808 (!) 147/66   74 14 95 %    11/04/21 1728 (!) 174/75 97.8 °F (36.6 °C) Oral 78 16 96 % 126 lb (57.2 kg)     Room air    GEN:  Appearance:  Age appropriate female in NAD. [place] . Level of Consciousness:  alert . Orientation:  full    HEENT: Sclera anicteric.  no conjunctival chemosis. moisto mucus membranes. no specific or diagnostic oral lesions. No scalp laceration or hematoma. NECK:  no signs of meningismus. Jugular veins not distended. Carotid pulses  2+.  no cervical lymphadenopathy. no thyromegaly. CV:  regular rhythm. normal S1 & S2.    no murmur. no rub.  no gallop. PULM:  Chest excursion is symmetric. Breath sounds are vesicular. Adventitious sounds:  none    AB:  Abdominal shape is normal.  Bowel sounds are active. Generally soft to palpation. no tenderness is present. no involuntary guarding. no rebound guarding. EXTR:  Skin is warm. Capillary refill brisk. no specific or pathognomic rash. no clubbing. no pitting edema. no active wound or ulcer.   Pulses 2+ x 4      LABS:  Lab Results   Component Value Date    WBC 4.6 11/04/2021 HGB 10.0 (L) 11/04/2021    HCT 28.2 (L) 11/04/2021    MCV 91.9 11/04/2021     11/04/2021     Lab Results   Component Value Date    CREATININE 1.1 11/04/2021    BUN 25 (H) 11/04/2021     (L) 11/04/2021    K 3.7 11/04/2021    CL 94 (L) 11/04/2021    CO2 24 11/04/2021     Lab Results   Component Value Date    ALT 14 11/04/2021    AST 22 11/04/2021    ALKPHOS 93 11/04/2021    BILITOT <0.2 11/04/2021     Lab Results   Component Value Date    CKTOTAL 58 08/23/2011    CKMB 1.04 08/23/2011    TROPONINI <0.01 11/04/2021     No results for input(s): PHART, SCK1ZRK, PO2ART in the last 72 hours. IMAGING:  XR CHEST PORTABLE    Result Date: 11/4/2021  EXAMINATION: ONE XRAY VIEW OF THE CHEST 11/4/2021 5:51 pm COMPARISON: 11/01/2021 HISTORY: ORDERING SYSTEM PROVIDED HISTORY: pneumonia, symptoms unchaged after abx started monday TECHNOLOGIST PROVIDED HISTORY: Reason for exam:->pneumonia, symptoms unchaged after abx started monday Reason for Exam: f/u pnemonia Acuity: Acute Type of Exam: Initial FINDINGS: The patient is rotated. The cardiac silhouette is enlarged. Pulmonary vessels are normal in caliber. There is scalloping of the right hemidiaphragm. Calcified granuloma present. There is mild fibrosis in the upper lungs. There is mild linear/patchy opacity in the left base. Bilateral moderate osteoarthritis of the shoulders. Mild left basilar atelectasis versus airspace disease, not significantly changed. CT CHEST ABDOMEN PELVIS W CONTRAST    Result Date: 11/4/2021  EXAMINATION: CT OF THE CHEST, ABDOMEN, AND PELVIS WITH CONTRAST 11/4/2021 6:51 pm TECHNIQUE: CT of the chest, abdomen and pelvis was performed with the administration of intravenous contrast. Multiplanar reformatted images are provided for review. Dose modulation, iterative reconstruction, and/or weight based adjustment of the mA/kV was utilized to reduce the radiation dose to as low as reasonably achievable.  COMPARISON: CT abdomen pelvis, 07/13/2021 CT chest, 11/02/2019 HISTORY: ORDERING SYSTEM PROVIDED HISTORY: cough, chills, body aches, no appetite, weakness TECHNOLOGIST PROVIDED HISTORY: Reason for exam:->cough, chills, body aches, no appetite, weakness Additional Contrast?->None Decision Support Exception - unselect if not a suspected or confirmed emergency medical condition->Emergency Medical Condition (MA) Reason for Exam: pneumonia monday, now feeling even worse Acuity: Acute Type of Exam: Initial Relevant Medical/Surgical History: partial rt nephrectomy, gb FINDINGS: Chest: Mediastinum: There is a moderate-sized pericardial effusion, measuring 1.9 cm in thickness posteriorly. Cardiomegaly is noted. Thoracic aorta is tortuous with moderate calcification. There is no aneurysm. There is no mediastinal or hilar adenopathy. Lungs/pleura: There are small bilateral pleural effusions. There is stable pleuroparenchymal scarring in the apices, greater on the right. There is mild left basilar atelectasis. No consolidation is identified. Soft Tissues/Bones: No acute osseous abnormality is appreciated. The chest wall is unremarkable. Abdomen/Pelvis: Organs: The liver is homogeneous and normal in attenuation. There is post cholecystectomy prominence of the common duct. The pancreas, spleen and adrenal glands are unremarkable. There is a stable cortical defect in the posterior interpolar region of the kidney, consistent with previous intervention. No mass or hydronephrosis is identified. GI/Bowel: There is a small hiatal hernia. The stomach is nondistended. No dilated loops of small bowel are identified. There is mild to moderate prominence of stool throughout the colon. There is diverticulosis of the sigmoid colon without acute inflammatory changes. There is normal enhancement of the mesenteric vasculature. Pelvis: Urinary bladder and uterus are unremarkable.  Peritoneum/Retroperitoneum: There is severe aortoiliac calcification, without aneurysm. No adenopathy. Bones/Soft Tissues: No acute osseous abnormality. Abdominal wall is unremarkable. Chest- Moderate-sized pericardial effusion, persistent since 07/13/2021. Small bilateral pleural effusions. Cardiomegaly. Mild left basilar atelectasis. --- Abdomen pelvis- No acute abdominopelvic abnormality. Mild-to-moderate prominence of stool in the colon, which may correlate with constipation. I directly reviewed all recent imaging studies as well as pertinent prior studies. Radiology reports may or may not be available at the time of my review. Note : pericardial effusion is chronic    EKG:  New and pertinent prior tracings were directly reviewed. My interpretation is as follows:  Sinus rhythm with occasional PVCs    Active Hospital Problems    Diagnosis Date Noted    Hypothyroidism [E03.9] 11/05/2021    Chills [R68.83] 11/04/2021    HTN (hypertension) [I10] 03/15/2011       ASSESSMENT & PLAN  Chills  -  No acute process is apparent. Patient was admitted for observation primarily because of her age and concern for her Hgb dropping from 13 to 10 g/dL since 11/1. She denies any symptoms of blood loss. Will monitor for fevers, e/o blood loss, and check procalcitonin both now and in the morning. HTN, HLD  -  Continue home amlodipine, atenolol, and statin. Hypothyroidism  -  Continue home levothyroxine and check TSH. DVT prophylaxis: SCDs, lovenox  Code Status:  Full  Disposition:  Observation anticipate d/c to home in 1-2 days.     Luis Alberto Jeffries MD MD

## 2021-11-05 NOTE — ED NOTES
Report given to floor rn.  Pt transferred via wheelchair     [facility]iel BritoG[facility]rie [facility]  11/05/21 [id number]

## 2021-11-05 NOTE — PLAN OF CARE
Problem: Musculor/Skeletal Functional Status  Intervention: OT Evaluation/treatment  Note: . Problem: Musculor/Skeletal Functional Status  Intervention: Fall precautions  Note: . Pt educated on role of OT. Please see OT evaluation & treatment notes for specific information re: plan of care, interventions provided and progress toward goals. Thank you.  Jennifer Murguia OTR/L

## 2021-11-05 NOTE — DISCHARGE SUMMARY
Hospital Medicine Discharge Summary    Patient ID: Michael Acevedo      Patient's PCP: Jim Villatoro MD    Admit Date: 11/4/2021     Discharge Date:  11/5/2021    Admitting Physician: Zahra Tan MD     Discharge Physician: Juve Lacey MD     Discharge Diagnoses: Active Hospital Problems    Diagnosis     Hypothyroidism [E03.9]     Chills [R68.83]     Generalized weakness [R53.1]     HTN (hypertension) [I10]        The patient was seen and examined on day of discharge and this discharge summary is in conjunction with any daily progress note from day of discharge. HPI from admission H&P :   Michael Acevedo is a 80 y.o. female. She presents from home. She has felt generally ill for about a week. She relates having less energy than usual, subjective chills, sweats, and feeling weaker than usual.  All her complaints seem to be constitutional.  She has no focal complaints. She does relate that she fell striking the back of her head in her kitchen about a week ago. She did not lose consciousness. She also relates she had a kidney infection about 3 weeks ago. She got admitted to the hospital for the management of following medical issues     Hospital Course: By Problem List   Chills  -  No acute process is apparent. Patient was admitted for observation primarily because of her age and concern for her Hgb dropping from 13 to 10 g/dL since 11/1. She denied any symptoms of blood loss. monitored for fevers (remained afebrile ) , e/o blood loss (none) , and checked  procalcitonin (0.05)    - infection ruled OUT       HTN, HLD  -  Continue home amlodipine, atenolol, and statin.     Hypothyroidism  -  Continue home levothyroxine and TSH pending . Patient evaluated by PT/OT and was recommended Home with assist PRN .  Patient discharged home in stable medical condition         Physical Exam Performed:   BP (!) 144/65   Pulse 69   Temp 98.2 °F (36.8 °C) (Oral)   Resp 18   Ht 5' (1.524 m) Wt 126 lb (57.2 kg)   SpO2 93%   BMI 24.61 kg/m²       General appearance:  No apparent distress, appears stated age and cooperative. HEENT:  Normal cephalic, atraumatic without obvious deformity. Pupils equal, round, and reactive to light. Extra ocular muscles intact. Conjunctivae/corneas clear. Neck: Supple, with full range of motion. No jugular venous distention. Trachea midline. Respiratory:  Normal respiratory effort. Clear to auscultation, bilaterally without Rales/Wheezes/Rhonchi. Cardiovascular:  Regular rate and rhythm with normal S1/S2 without murmurs, rubs or gallops. Abdomen: Soft, non-tender, non-distended with normal bowel sounds. Musculoskeletal:  No clubbing, cyanosis or edema bilaterally. Full range of motion without deformity. Skin: Skin color, texture, turgor normal.  No rashes or lesions. Neurologic:  Neurovascularly intact without any focal sensory/motor deficits. Cranial nerves: II-XII intact, grossly non-focal.  Psychiatric:  Alert and oriented, thought content appropriate, normal insight  Capillary Refill: Brisk,< 3 seconds   Peripheral Pulses: +2 palpable, equal bilaterally       Labs: For convenience and continuity at follow-up the following most recent labs are provided:      CBC:    Lab Results   Component Value Date    WBC 4.7 11/05/2021    HGB 11.9 11/05/2021    HCT 34.5 11/05/2021     11/05/2021       Renal:    Lab Results   Component Value Date     11/05/2021    K 3.7 11/05/2021    K 3.7 11/04/2021     11/05/2021    CO2 24 11/05/2021    BUN 19 11/05/2021    CREATININE 1.0 11/05/2021    CALCIUM 8.8 11/05/2021    PHOS 3.7 01/25/2015         Significant Diagnostic Studies    Radiology:   CT CHEST ABDOMEN PELVIS W CONTRAST   Final Result   Chest-      Moderate-sized pericardial effusion, persistent since 07/13/2021. Small bilateral pleural effusions. Cardiomegaly. Mild left basilar atelectasis.       ---      Abdomen pelvis-      No acute abdominopelvic abnormality. Mild-to-moderate prominence of stool in the colon, which may correlate with   constipation. XR CHEST PORTABLE   Final Result   Mild left basilar atelectasis versus airspace disease, not significantly   changed. Consults:   None    Disposition: Home    Condition at Discharge: Stable     Discharge Instructions/Follow-up: PCP as per your prior scheduled appointments    Code Status:  Full Code     Activity: activity as tolerated    Diet: Regular diet       Discharge Medications:  Current Discharge Medication List           Details   atenolol (TENORMIN) 25 MG tablet Take 1 tablet by mouth daily. Qty: 30 tablet, Refills: 3      amLODIPine (NORVASC) 10 MG tablet Take 10 mg by mouth daily. simvastatin (ZOCOR) 20 MG tablet Take 20 mg by mouth nightly. omeprazole (PRILOSEC) 20 MG capsule Take 20 mg by mouth daily. levothyroxine (SYNTHROID) 125 MCG tablet Take 125 mcg by mouth daily       aspirin 81 MG EC tablet Take 81 mg by mouth daily. ondansetron (ZOFRAN ODT) 4 MG disintegrating tablet Take 1 tablet by mouth every 8 hours as needed for Nausea or Vomiting  Qty: 8 tablet, Refills: 0      NASAL SALINE NA by Nasal route as needed       meclizine (ANTIVERT) 25 MG tablet Take 25 mg by mouth 3 times daily as needed. fluticasone (FLONASE) 50 MCG/ACT nasal spray 1 spray by Nasal route as needed              Time Spent on discharge is more than 30 minutes  in the examination, evaluation, counseling and review of medications and discharge plan. Signed:    Gerda Hand MD   11/5/2021      Thank you Antonio Garza MD for the opportunity to be involved in this patient's care. If you have any questions or concerns please feel free to contact me at [phone].

## 2021-11-05 NOTE — PROGRESS NOTES
Physical Therapy    Facility/Department: [place] [address] - MED SURG  Initial Assessment/DC summary     NAME: Barney Mayo  : 1926  MRN: [id number]    Date of Service: 2021    Discharge Recommendations:  Home with assist PRN   PT Equipment Recommendations  Equipment Needed: No    Assessment   Assessment: Pt functioning at baseline demonstrating mod I with SPC. Pt lives with her daughter in a condo, has no stairs to navigate. Pt denied dizziness or unsteadiness with mobility. No acute PT needs. Prognosis: Excellent  Decision Making: Low Complexity  PT Education: Goals; General Safety;Gait Training;PT Role;Plan of Care;Equipment; Functional Mobility Training  Patient Education: Pt expressed understanding on role of PT to assess mobility  Barriers to Learning: none  REQUIRES PT FOLLOW UP: No  Activity Tolerance  Activity Tolerance: Patient Tolerated treatment well  Activity Tolerance: 120/80, 97% SpO2, 87 HR       Patient Diagnosis(es): The primary encounter diagnosis was Generalized weakness. Diagnoses of Chills, Hyponatremia, Anemia, unspecified type, Yeast infection of the vagina, and Acute cystitis without hematuria were also pertinent to this visit. has a past medical history of Amaurosis fugax of left eye, Benign neoplasm, Cancer of kidney ([facility] [address] Colonic polyp, Cyst of ovary, left, Dehydration, Diverticulosis of colon, Dyslipidemia, GERD (gastroesophageal reflux disease), Glaucoma, Hyperlipidemia, Hypertension, IBS (irritable bowel syndrome), Osteoporosis, Other specified gastritis without mention of hemorrhage, and Pericardial effusion. has a past surgical history that includes Upper gastrointestinal endoscopy (1998); Colonoscopy (2009); Upper gastrointestinal endoscopy (09/10/1999); Colonoscopy (2001); Upper gastrointestinal endoscopy (2001); Upper gastrointestinal endoscopy (2004); Colonoscopy (2006);  Upper gastrointestinal endoscopy (2007); partial nephrectomy (11/03/2010); Cholecystectomy, laparoscopic (02/11/1998); bronchoscopy (N/A, 11/04/2019); bronchoscopy (N/A, 11/4/2019); and bronchoscopy (11/4/2019). Restrictions  Restrictions/Precautions  Restrictions/Precautions: Up as Tolerated  Vision/Hearing  Vision: Impaired  Vision Exceptions: Wears glasses for reading  Hearing: Exceptions to [facility]  Hearing Exceptions: Hard of hearing/hearing concerns;Bilateral hearing aid     Subjective  General  Chart Reviewed: Yes  Patient assessed for rehabilitation services?: Yes  Family / Caregiver Present: No  Referring Practitioner: Maty Black MD  Referral Date : 11/05/21  Diagnosis: chills  Follows Commands: Within Functional Limits  General Comment  Comments: cleared bynursing  Subjective  Subjective: pt resting in bed.   Pain Screening  Patient Currently in Pain: Denies  Vital Signs  Patient Currently in Pain: Denies       Orientation  Orientation  Overall Orientation Status: Within Normal Limits  Social/Functional History  Social/Functional History  Lives With: Daughter  Type of Home:  (condo)  Home Layout: One level  Home Access:  (chair lift)  Bathroom Shower/Tub: Tub/Shower unit, Walk-in shower  Bathroom Toilet: Handicap height  Bathroom Equipment: Grab bars in shower  Home Equipment: Cane  ADL Assistance: Independent  Homemaking Assistance: Independent  Homemaking Responsibilities: Yes  Ambulation Assistance: Independent (cane for out of the house or if not feeling well)  Transfer Assistance: Independent  Active : No  Occupation: Retired  Leisure & Hobbies: the [employer]  Additional Comments: 1 fall a week a go  Cognition        Objective          PROM RLE (degrees)  RLE PROM: WFL  AROM RLE (degrees)  RLE AROM: WFL  PROM LLE (degrees)  LLE PROM: WFL  AROM LLE (degrees)  LLE AROM : WFL  Strength RLE  Strength RLE: WFL  Strength LLE  Strength LLE: WFL  Tone RLE  RLE Tone: Normotonic  Tone LLE  LLE Tone: Normotonic  Motor Control  Gross Motor?:

## 2021-11-05 NOTE — ED NOTES
Pt assisted to restroom for urine sample. Sample obtained and sent to lab.    CT at bedside to take pt to [address] Stephen Gallegos RN  11/04/21 0779

## 2021-11-05 NOTE — PROGRESS NOTES
Occupational Therapy   Occupational Therapy Initial Assessment, Treatment, and Discharge  Date: 2021   Patient Name: Derrick Montes  MRN: [id number]     : 1926    Date of Service: 2021    Discharge Recommendations:  Home with Home health OT     Assessment   Performance deficits / Impairments: Decreased ADL status; Decreased balance  Assessment: Patient demonstrates mild balance deficits with ADLs in standing and reaching to the floor as evidence by sway, forward posturing, and reflexive movements to prevent herself from falling. Although she appears to be at baseline, she may benefit from additional therapy to increase her strength and balance for maintaining her status of living at home with her daughter. Prognosis: Fair  Decision Making: Low Complexity  OT Education: OT Role;Plan of Care;ADL Adaptive Strategies  Patient Education: disease specific: role of OT, fall risk precautions, reacher at home, importance of fer-care  No Skilled OT: Safe to return home  REQUIRES OT FOLLOW UP: No  Activity Tolerance  Activity Tolerance: Patient Tolerated treatment well  Activity Tolerance: Patient's blood pressure initally 112/65, HR 64, O2 95% at session start. At session end, blood pressure increased to 144/66, HR 62, O2 95%  Safety Devices  Safety Devices in place: Not Applicable (patient discharged)           Patient Diagnosis(es): The primary encounter diagnosis was Generalized weakness. Diagnoses of Chills, Hyponatremia, Anemia, unspecified type, Yeast infection of the vagina, and Acute cystitis without hematuria were also pertinent to this visit.      has a past medical history of Amaurosis fugax of left eye, Benign neoplasm, Cancer of kidney ([facility] [address] Colonic polyp, Cyst of ovary, left, Dehydration, Diverticulosis of colon, Dyslipidemia, GERD (gastroesophageal reflux disease), Glaucoma, Hyperlipidemia, Hypertension, IBS (irritable bowel syndrome), Osteoporosis, Other specified gastritis without mention of hemorrhage, and Pericardial effusion. has a past surgical history that includes Upper gastrointestinal endoscopy (02/11/1998); Colonoscopy (08/24/2009); Upper gastrointestinal endoscopy (09/10/1999); Colonoscopy (07/17/2001); Upper gastrointestinal endoscopy (07/17/2001); Upper gastrointestinal endoscopy (02/03/2004); Colonoscopy (08/29/2006); Upper gastrointestinal endoscopy (11/29/2007); partial nephrectomy (11/03/2010); Cholecystectomy, laparoscopic (02/11/1998); bronchoscopy (N/A, 11/04/2019); bronchoscopy (N/A, 11/4/2019); and bronchoscopy (11/4/2019).       Restrictions  Restrictions/Precautions  Restrictions/Precautions: Up as Tolerated    Subjective   General  Chart Reviewed: Yes  Patient assessed for rehabilitation services?: Yes  Family / Caregiver Present: No  Referring Practitioner: Karla Vo, 11/5  Diagnosis: Chills  Vital Signs  BP: (!) 144/69  BP Location: Right upper arm  Social/Functional History  Social/Functional History  Lives With: Daughter  Type of Home:  (condo)  Home Layout: One level  Home Access:  (chair lift)  Bathroom Shower/Tub: Tub/Shower unit, Walk-in shower  Bathroom Toilet: Handicap height  Bathroom Equipment: Grab bars in shower  Home Equipment: Cane, Reacher  ADL Assistance: Independent  Homemaking Assistance: Independent  Homemaking Responsibilities: Yes  Ambulation Assistance: Independent  Transfer Assistance: Independent  Active : No  Patient's  Info: daughter drives patient  Occupation: Retired  Leisure & Hobbies: [employer]  Additional Comments: 1 fall a week a go       Objective   Vision: Impaired  Vision Exceptions: Wears glasses for reading  Hearing: Exceptions to [facility]  Hearing Exceptions: Hard of hearing/hearing concerns;Bilateral hearing aid    Orientation  Overall Orientation Status: Within Normal Limits  Observation/Palpation  Posture: Fair  Observation: rounded shoulders  Balance  Sitting Balance: Modified independent   Standing Balance: Modified independent   Toilet Transfers  Toilet - Technique: Ambulating  Equipment Used: Standard toilet  Toilet Transfer: Supervision  ADL  Grooming: Modified independent   LE Dressing: Modified independent ; Increased time to complete  Toileting: Supervision;Stand by assistance (cues for wiping)  Tone RUE  RUE Tone: Normotonic  Tone LUE  LUE Tone: Normotonic  Coordination  Movements Are Fluid And Coordinated: No  Coordination and Movement description: Decreased speed;Decreased accuracy     Bed mobility  Supine to Sit: Unable to assess  Sit to Supine: Unable to assess  Transfers  Sit to stand: Independent  Stand to sit: Independent  Vision - Basic Assessment  Patient Visual Report: No visual complaint reported. Cognition  Overall Cognitive Status: Exceptions  Perception  Overall Perceptual Status: WFL     Sensation  Overall Sensation Status: WFL  Exercises  Other: unable to complete AROM exercises due to patient discharge     LUE AROM (degrees)  LUE AROM : WFL  RUE AROM (degrees)  RUE AROM : WFL  LUE Strength  LUE Strength Comment: WFL for ADLs, but not formally assessed due to discharge  RUE Strength  RUE Strength Comment: WFL for ADLs, but not formally assessed due to discharge     Plan   Plan  Times per week: 1x only      AM-PAC Score  AM-[facility] Inpatient Daily Activity Raw Score: 22 (11/05/21 1412)  AM-PAC Inpatient ADL T-Scale Score : 47.1 (11/05/21 1412)  ADL Inpatient CMS 0-100% Score: 25.8 (11/05/21 1412)  ADL Inpatient CMS G-Code Modifier : [place] (11/05/21 1412)    Goals  Short term goals  Time Frame for Short term goals: 1x only  Short term goal 1: Patient will verbalize understanding of recommendations for wiping after toileting to lessen potential for infection. - GOAL MET 11/5/21  Short term goal 2: Patient will verbalize understanding of recommendations for use of a reacher/grabber for improving safety in home environment. - GOAL MET 11/5/21  Patient Goals   Patient goals : \"I'm feeling better since I ate. I'm going home today\"       Therapy Time   Individual Concurrent Group Co-treatment   Time In 1430         Time Out 1453         Minutes 23         Timed Code Treatment Minutes: 25 Radha Gallegos, OTR/L

## 2021-11-05 NOTE — ED NOTES
Ambulated pt approx 50 feet on room air while monitoring pulse oximetry. Prior to ambulation, pt was resting comfortably with HR and SpO2 of 95% and 69 bpm. During ambulation, pt's SpO2 and HR were 94% and 78 bpm. Pt stated that she did not feel SOB, CP, dizziness or lightheadedness. After ambulation, pt was returned to bed SpO2 and HR were 97% and 72 bpm. Aurelia Denise NP made aware.         [facility]  11/04/21 1204       Ashish [facility]  11/04/21 1747

## 2021-11-06 LAB — URINE CULTURE, ROUTINE: NORMAL

## 2021-11-08 LAB
BLOOD CULTURE, ROUTINE: NORMAL
CULTURE, BLOOD 2: NORMAL

## 2021-11-11 NOTE — ED NOTES
Attending Supervisory Note/Shared Visit   I have personally performed a face to face diagnostic evaluation on this patient. I have reviewed the mid-levels findings and agree. History and Exam by me shows a well-appearing female no acute distress. Patient presented to the ED for evaluation of generalized weakness. She reports having near syncopal episodes. States that she has been treated for pneumonia recently but feels like is not getting any better despite multiple rounds of antibiotics. Denies focal neurological deficits or recent falls. Denies changes in bowel or urine function. She does report decreased appetite. Patient is a difficult historian and is very vague complaints. On exam cranial nerves II to XII are grossly intact. She has intact strength sensation all extremities. Patient is a regular rate and rhythm. Lungs are clear to auscultation without rhonchi or rales. Abdomen soft nontender nondistended. Patient is nontoxic-appearing. Vital signs within normal limits. Patient was initially seen and evaluated advanced practice provider. Laboratory work-up did demonstrate a hemoglobin had dropped from 13-10 within 3 days. Patient is normocytic. She is mildly hyponatremic. Due to the patient's hemoglobin drop she will be admitted to the hospital for further medical management. I agree with the [facility] plan of care. Please [facility] Note for full ED course and final disposition. EKG shows a sinus rhythm with a ventricular rate of 70 bpm.  NH interval and QTc interval within normal limits. Patient has a normal axis. There are no significant ST elevations or depressions EKG is nondiagnostic for ACS. Compared EKG from 7/13/2021 do not appreciate any significant changes      Disposition:  1. Generalized weakness    2. Chills    3. Hyponatremia    4. Anemia, unspecified type    5. Yeast infection of the vagina    6.  Acute cystitis without hematuria          Thao Cueva MD  Attending Emergency Physician       Laura Glynn MD  11/11/21 9067

## 2021-12-07 ENCOUNTER — APPOINTMENT (OUTPATIENT)
Dept: CT IMAGING | Age: 86
End: 2021-12-07
Payer: MEDICARE

## 2021-12-07 ENCOUNTER — HOSPITAL ENCOUNTER (EMERGENCY)
Age: 86
Discharge: HOME | End: 2021-12-07
Attending: EMERGENCY MEDICINE
Payer: MEDICARE

## 2021-12-07 VITALS
OXYGEN SATURATION: 96 % | TEMPERATURE: 98 F | SYSTOLIC BLOOD PRESSURE: 152 MMHG | HEART RATE: 67 BPM | RESPIRATION RATE: 18 BRPM | DIASTOLIC BLOOD PRESSURE: 65 MMHG | WEIGHT: 126 LBS | BODY MASS INDEX: 24.61 KG/M2
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
## 2021-12-07 DIAGNOSIS — S09.90XA CLOSED HEAD INJURY, INITIAL ENCOUNTER: Primary | ICD-10-CM

## 2021-12-07 DIAGNOSIS — S01.01XA LACERATION OF SCALP, INITIAL ENCOUNTER: ICD-10-CM

## 2021-12-07 PROCEDURE — 12001 RPR S/N/AX/GEN/TRNK 2.5CM/<: CPT

## 2021-12-07 PROCEDURE — 99283 EMERGENCY DEPT VISIT LOW MDM: CPT

## 2021-12-07 PROCEDURE — 70450 CT HEAD/BRAIN W/O DYE: CPT

## 2021-12-07 PROCEDURE — 90471 IMMUNIZATION ADMIN: CPT | Performed by: EMERGENCY MEDICINE

## 2021-12-07 PROCEDURE — 6360000002 HC RX W HCPCS: Performed by: EMERGENCY MEDICINE

## 2021-12-07 PROCEDURE — 12002 RPR S/N/AX/GEN/TRNK2.6-7.5CM: CPT

## 2021-12-07 PROCEDURE — 90715 TDAP VACCINE 7 YRS/> IM: CPT | Performed by: EMERGENCY MEDICINE

## 2021-12-07 RX ADMIN — TETANUS TOXOID, REDUCED DIPHTHERIA TOXOID AND ACELLULAR PERTUSSIS VACCINE, ADSORBED 0.5 ML: 5; 2.5; 8; 8; 2.5 SUSPENSION INTRAMUSCULAR at 18:59

## 2021-12-07 ASSESSMENT — PAIN SCALES - GENERAL: PAINLEVEL_OUTOF10: 8

## 2021-12-08 NOTE — ED PROVIDER NOTES
Emergency Physician Note        Note Open Time: 7:07 PM EST    Chief Complaint  Fall (pt was putting throw around shoulders and fell, hit head on cedar chest)       History of Present Illness  Fabby Gonsalez is a 80 y.o. female who presents to the ED for fall. Patient reports she was putting a blanket around her shoulders and accidentally went off balance and fell and hit her head on a cedar chest.  No loss of consciousness. No other injuries. No other pain complaints. Tetanus not up-to-date. Bleeding controlled prior to arrival.    10 systems reviewed, pertinent positives per HPI otherwise noted to be negative    I have reviewed the following from the nursing documentation:      Prior to Admission medications    Medication Sig Start Date End Date Taking? Authorizing Provider   ondansetron (ZOFRAN ODT) 4 MG disintegrating tablet Take 1 tablet by mouth every 8 hours as needed for Nausea or Vomiting 7/13/21   Jose Doe PA-C   atenolol (TENORMIN) 25 MG tablet Take 1 tablet by mouth daily. 8/7/14   Cherelle Estrella MD   amLODIPine (NORVASC) 10 MG tablet Take 10 mg by mouth daily. Historical Provider, MD   simvastatin (ZOCOR) 20 MG tablet Take 20 mg by mouth nightly. Historical Provider, MD   NASAL SALINE NA by Nasal route as needed     Historical Provider, MD   meclizine (ANTIVERT) 25 MG tablet Take 25 mg by mouth 3 times daily as needed. Historical Provider, MD   fluticasone (FLONASE) 50 MCG/ACT nasal spray 1 spray by Nasal route as needed     Historical Provider, MD   omeprazole (PRILOSEC) 20 MG capsule Take 20 mg by mouth daily. Historical Provider, MD   levothyroxine (SYNTHROID) 125 MCG tablet Take 125 mcg by mouth daily     Historical Provider, MD   aspirin 81 MG EC tablet Take 81 mg by mouth daily.     Historical Provider, MD       Allergies as of 12/07/2021 - Fully Reviewed 12/07/2021   Allergen Reaction Noted    Avelox [moxifloxacin hydrochloride]  09/01/2012    Benadryl [diphenhydramine hcl]  11/18/2010    Biaxin [clarithromycin]  09/01/2012    Cefuroxime  09/01/2012    Codeine  11/18/2010    Doxycycline  09/01/2012    Morphine  09/01/2012    Norgesic [orphenadrine-aspirin-caffeine]  09/01/2012    Opium  11/18/2010    Penicillins  11/18/2010    Promethazine  09/01/2012    Propoxyphene  11/18/2010    Sulfa antibiotics  11/18/2010    Tetracyclines & related  09/01/2012    Tramadol  11/18/2010       Past Medical History:   Diagnosis Date    Amaurosis fugax of left eye 3/15/2011    Benign neoplasm     colon    Cancer of kidney ([facility] [address] 11/03/2010    Right    Colonic polyp 1999    Cyst of ovary, left 9/2/2012    Dehydration 2/9/2011    Diverticulosis of colon 1999    Dyslipidemia 2008    GERD (gastroesophageal reflux disease)     Glaucoma     Hyperlipidemia     Hypertension 1960    IBS (irritable bowel syndrome)     Osteoporosis     Other specified gastritis without mention of hemorrhage 1998    Pericardial effusion 8/23/2011        Surgical History:   Past Surgical History:   Procedure Laterality Date    BRONCHOSCOPY N/A 11/04/2019    BRONCHOSCOPY N/A 11/4/2019    BRONCHOSCOPY ALVEOLAR LAVAGE performed by Esperanza Ingram MD at [address] [facility]  11/4/2019    BRONCHOSCOPY THERAPUTIC ASPIRATION INITIAL [facility]  02/11/[facility]    COLONOSCOPY  08/24/2009    TA & Diverticulosis    COLONOSCOPY  07/17/2001    diverticulosis    COLONOSCOPY  08/29/2006    diverticulosis    PARTIAL NEPHRECTOMY  11/03/2010    Right    UPPER GASTROINTESTINAL ENDOSCOPY  02/11/1998    chronic gastritis    UPPER GASTROINTESTINAL ENDOSCOPY  09/10/1999    chronic gastritis    UPPER GASTROINTESTINAL ENDOSCOPY  07/17/2001    chronic gastritis w intestinal metaplasia    UPPER GASTROINTESTINAL ENDOSCOPY  02/03/2004    chronic gastritis w intestinal metaplasia    UPPER GASTROINTESTINAL ENDOSCOPY 11/29/2007    gastritis        Family History:  History reviewed. No pertinent family history. Social History     Socioeconomic History    Marital status:      Spouse name: Not on file    Number of children: Not on file    Years of education: Not on file    Highest education level: Not on file   Occupational History    Not on file   Tobacco Use    Smoking status: Never Smoker    Smokeless tobacco: Never Used   Vaping Use    Vaping Use: Not on file   Substance and Sexual Activity    Alcohol use: No    Drug use: No    Sexual activity: Never   Other Topics Concern    Not on file   Social History Narrative    Not on file     Social Determinants of Health     Financial Resource Strain:     Difficulty of Paying Living Expenses: Not on file   Food Insecurity:     Worried About Running Out of Food in the Last Year: Not on file    Rebecca of Food in the Last Year: Not on file   Transportation Needs:     Lack of Transportation (Medical): Not on file    Lack of Transportation (Non-Medical):  Not on file   Physical Activity:     Days of Exercise per Week: Not on file    Minutes of Exercise per Session: Not on file   Stress:     Feeling of Stress : Not on file   Social Connections:     Frequency of Communication with Friends and Family: Not on file    Frequency of Social Gatherings with Friends and Family: Not on file    Attends Anabaptist Services: Not on file    Active Member of [address] [place] [address] or Organizations: Not on file    Attends Club or Organization Meetings: Not on file    Marital Status: Not on file   Intimate Partner Violence:     Fear of Current or Ex-Partner: Not on file    Emotionally Abused: Not on file    Physically Abused: Not on file    Sexually Abused: Not on file   Housing Stability:     Unable to Pay for Housing in the Last Year: Not on file    Number of [place] in the Last Year: Not on file    Unstable Housing in the Last Year: Not on file       Nursing notes reviewed. ED Triage Vitals [12/07/21 1718]   Enc Vitals Group      BP (!) 152/65      Pulse 67      Resp 18      Temp 98 °F (36.7 °C)      Temp Source Oral      SpO2 96 %      Weight 126 lb (57.2 kg)      Height       Head Circumference       Peak Flow       Pain Score       Pain Loc       Pain Edu? Excl. in [address]? GENERAL:  Awake, alert. Well developed, well nourished with no apparent distress. HENT:  Normocephalic, right parietal scalp laceration. No depressed skull fracture or hematoma, moist mucous membranes. EYES:  Pupils equal round and reactive to light, Conjunctiva normal, extraocular movements normal.  NECK:  No meningeal signs, Supple. No tenderness. CHEST:  Regular rate and rhythm, chest wall non-tender. LUNGS:  Clear to auscultation bilaterally. ABDOMEN:  Soft, non-tender, no rebound, rigidity or guarding, non-distended, normal bowel sounds. No costovertebral angle tenderness to palpation. BACK:  No tenderness. No step-offs, contusions or abrasions throughout the cervical, thoracic or lumbar spine. EXTREMITIES:  Normal range of motion, no edema, no bony tenderness, no deformity, distal pulses present. Remainder of axial and appendicular skeleton NT exc as noted. Full active ROM as well at all jts exc as noted. SKIN: Warm, dry and intact. NEUROLOGIC: Normal mental status. Moving all extremities to command. RADIOLOGY  X-RAYS:  I have reviewed radiologic plain film image(s). ALL OTHER NON-PLAIN FILM IMAGES SUCH AS CT, ULTRASOUND AND MRI HAVE BEEN READ BY THE RADIOLOGIST. CT Head WO Contrast   Final Result   No acute hemorrhage or midline shift. Other findings as described. PROCEDURES  PROCEDURE:  LACERATION REPAIR  Manuel Gilbert or their surrogate had an opportunity to ask questions, and the risks, benefits, and alternatives were discussed. The wound was prepped and draped to maintain a sterile field. It was copiously irrigated.  It was explored to its depth in a bloodless field with no sign of tendon, nerve, or vascular injury. No foreign bodies were identified. It was closed with 3 staples. Total length of laceration(s) is 3cm. There were no complications during the procedure. MEDICAL DECISION MAKING        I advised the patient that they are at increased risk for delayed bleeding given their blood thinner medication and to watch for signs of head injury and return if they have any of the signs such as headaches, nausea, dizziness or any change in mental status. No results found for this visit on 12/07/21. I estimate there is LOW risk for ABDOMINAL AORTIC ANEURYSM, CAUDA EQUINA or CENTRAL CORD SYNDROME, COMPARTMENT SYNDROME, EPIDURAL MASS LESION, HERNIATED DISK CAUSING SEVERE STENOSIS, INTRACRANIAL HEMORRHAGE, INTRA-ABDOMINAL INJURY, PERFORATED BOWEL, SUBDURAL HEMATOMA, TENDON or NEUROVASCULAR INJURY, or a THORACIC AORTIC DISSECTION, thus I consider the discharge disposition reasonable. Also, there is no evidence or peritonitis, sepsis, or toxicity. Kirti Tavarez and I have discussed the diagnosis and risks, and we agree with discharging home to follow-up with their primary doctor. We also discussed returning to the Emergency Department immediately if new or worsening symptoms occur. We have discussed the symptoms which are most concerning (e.g., bloody stool, fever, changing or worsening pain, vomiting) that necessitate immediate return. Final Impression    1. Closed head injury, initial encounter    2. Laceration of scalp, initial encounter    3. Fall from slip, trip, or stumble, initial encounter        Blood pressure (!) 152/65, pulse 67, temperature 98 °F (36.7 °C), temperature source Oral, resp. rate 18, weight 126 lb (57.2 kg), SpO2 96 %. Patient was given scripts for the following medications. I counseled patient how to take these medications.   New Prescriptions    No medications on file       Disposition  Pt is in good

## 2021-12-08 NOTE — ED NOTES
Completed wound car using sterile techniques to patients head. Used osman hex and saline with 4x4 gauze. Patient tolerated well.  at bedside and placed 3 staples. Bleeding controlled. Antibiotic ointment placed over staples. Patient verbalized understanding of care taking instructions.      Aaron Ramirez  12/07/21 1911

## 2022-08-16 LAB
A/G RATIO: 1.2 (ref 1.1–2.2)
ALBUMIN SERPL-MCNC: 4.1 G/DL (ref 3.4–5)
ALP BLD-CCNC: 115 U/L (ref 40–129)
ALT SERPL-CCNC: 44 U/L (ref 10–40)
ANION GAP SERPL CALCULATED.3IONS-SCNC: 14 MMOL/L (ref 3–16)
AST SERPL-CCNC: 33 U/L (ref 15–37)
BASOPHILS ABSOLUTE: 0 K/UL (ref 0–0.2)
BASOPHILS RELATIVE PERCENT: 0.2 %
BILIRUB SERPL-MCNC: <0.2 MG/DL (ref 0–1)
BUN BLDV-MCNC: 58 MG/DL (ref 7–20)
CALCIUM SERPL-MCNC: 9.5 MG/DL (ref 8.3–10.6)
CHLORIDE BLD-SCNC: 99 MMOL/L (ref 99–110)
CO2: 20 MMOL/L (ref 21–32)
CREAT SERPL-MCNC: 1.5 MG/DL (ref 0.6–1.2)
EOSINOPHILS ABSOLUTE: 0 K/UL (ref 0–0.6)
EOSINOPHILS RELATIVE PERCENT: 0.6 %
GFR AFRICAN AMERICAN: 39
GFR NON-AFRICAN AMERICAN: 32
GLUCOSE BLD-MCNC: 136 MG/DL (ref 70–99)
HCT VFR BLD CALC: 34.7 % (ref 36–48)
HEMOGLOBIN: 11.8 G/DL (ref 12–16)
LIPASE: 39 U/L (ref 13–60)
LYMPHOCYTES ABSOLUTE: 0.4 K/UL (ref 1–5.1)
LYMPHOCYTES RELATIVE PERCENT: 5.4 %
MCH RBC QN AUTO: 31.6 PG (ref 26–34)
MCHC RBC AUTO-ENTMCNC: 33.8 G/DL (ref 31–36)
MCV RBC AUTO: 93.6 FL (ref 80–100)
MONOCYTES ABSOLUTE: 0.7 K/UL (ref 0–1.3)
MONOCYTES RELATIVE PERCENT: 9.6 %
NEUTROPHILS ABSOLUTE: 6.5 K/UL (ref 1.7–7.7)
NEUTROPHILS RELATIVE PERCENT: 84.2 %
PDW BLD-RTO: 13.6 % (ref 12.4–15.4)
PLATELET # BLD: 192 K/UL (ref 135–450)
PMV BLD AUTO: 8.2 FL (ref 5–10.5)
POTASSIUM SERPL-SCNC: 3.6 MMOL/L (ref 3.5–5.1)
RBC # BLD: 3.71 M/UL (ref 4–5.2)
SODIUM BLD-SCNC: 133 MMOL/L (ref 136–145)
TOTAL PROTEIN: 7.4 G/DL (ref 6.4–8.2)
WBC # BLD: 7.7 K/UL (ref 4–11)

## 2022-08-16 PROCEDURE — 84484 ASSAY OF TROPONIN QUANT: CPT

## 2022-08-16 PROCEDURE — 85025 COMPLETE CBC W/AUTO DIFF WBC: CPT

## 2022-08-16 PROCEDURE — 80053 COMPREHEN METABOLIC PANEL: CPT

## 2022-08-16 PROCEDURE — 83690 ASSAY OF LIPASE: CPT

## 2022-08-17 ENCOUNTER — APPOINTMENT (OUTPATIENT)
Dept: CT IMAGING | Age: 87
End: 2022-08-17
Payer: MEDICARE

## 2022-08-17 ENCOUNTER — HOSPITAL ENCOUNTER (EMERGENCY)
Age: 87
Discharge: HOME | End: 2022-08-17
Attending: STUDENT IN AN ORGANIZED HEALTH CARE EDUCATION/TRAINING PROGRAM
Payer: MEDICARE

## 2022-08-17 VITALS
OXYGEN SATURATION: 93 % | HEART RATE: 82 BPM | SYSTOLIC BLOOD PRESSURE: 139 MMHG | RESPIRATION RATE: 16 BRPM | DIASTOLIC BLOOD PRESSURE: 73 MMHG | TEMPERATURE: 97.8 F

## 2022-08-17 DIAGNOSIS — K52.9 GASTROENTERITIS: ICD-10-CM

## 2022-08-17 DIAGNOSIS — R11.0 NAUSEA: Primary | ICD-10-CM

## 2022-08-17 DIAGNOSIS — N30.00 ACUTE CYSTITIS WITHOUT HEMATURIA: ICD-10-CM

## 2022-08-17 LAB
BACTERIA: ABNORMAL /HPF
BILIRUBIN URINE: NEGATIVE
BLOOD, URINE: NEGATIVE
CLARITY: CLEAR
COLOR: YELLOW
EPITHELIAL CELLS, UA: ABNORMAL /HPF (ref 0–5)
GLUCOSE URINE: NEGATIVE MG/DL
KETONES, URINE: NEGATIVE MG/DL
LEUKOCYTE ESTERASE, URINE: ABNORMAL
MICROSCOPIC EXAMINATION: YES
NITRITE, URINE: NEGATIVE
PH UA: 5.5 (ref 5–8)
PROTEIN UA: NEGATIVE MG/DL
RBC UA: ABNORMAL /HPF (ref 0–4)
SPECIFIC GRAVITY UA: 1.01 (ref 1–1.03)
TROPONIN: <0.01 NG/ML
URINE REFLEX TO CULTURE: YES
URINE TYPE: ABNORMAL
UROBILINOGEN, URINE: 0.2 E.U./DL
WBC UA: ABNORMAL /HPF (ref 0–5)

## 2022-08-17 PROCEDURE — 99284 EMERGENCY DEPT VISIT MOD MDM: CPT

## 2022-08-17 PROCEDURE — 2580000003 HC RX 258: Performed by: STUDENT IN AN ORGANIZED HEALTH CARE EDUCATION/TRAINING PROGRAM

## 2022-08-17 PROCEDURE — 74176 CT ABD & PELVIS W/O CONTRAST: CPT

## 2022-08-17 PROCEDURE — 87186 SC STD MICRODIL/AGAR DIL: CPT

## 2022-08-17 PROCEDURE — 6370000000 HC RX 637 (ALT 250 FOR IP): Performed by: STUDENT IN AN ORGANIZED HEALTH CARE EDUCATION/TRAINING PROGRAM

## 2022-08-17 PROCEDURE — 87077 CULTURE AEROBIC IDENTIFY: CPT

## 2022-08-17 PROCEDURE — 87086 URINE CULTURE/COLONY COUNT: CPT

## 2022-08-17 PROCEDURE — 96374 THER/PROPH/DIAG INJ IV PUSH: CPT

## 2022-08-17 PROCEDURE — 81001 URINALYSIS AUTO W/SCOPE: CPT

## 2022-08-17 PROCEDURE — 93005 ELECTROCARDIOGRAM TRACING: CPT | Performed by: STUDENT IN AN ORGANIZED HEALTH CARE EDUCATION/TRAINING PROGRAM

## 2022-08-17 PROCEDURE — 6360000002 HC RX W HCPCS: Performed by: STUDENT IN AN ORGANIZED HEALTH CARE EDUCATION/TRAINING PROGRAM

## 2022-08-17 RX ORDER — ONDANSETRON 4 MG/1
4 TABLET, ORALLY DISINTEGRATING ORAL EVERY 8 HOURS PRN
Qty: 12 TABLET | Refills: 0 | Status: SHIPPED | OUTPATIENT
Start: 2022-08-17

## 2022-08-17 RX ORDER — ONDANSETRON 2 MG/ML
4 INJECTION INTRAMUSCULAR; INTRAVENOUS ONCE
Status: COMPLETED | OUTPATIENT
Start: 2022-08-17 | End: 2022-08-17

## 2022-08-17 RX ORDER — NITROFURANTOIN 25; 75 MG/1; MG/1
100 CAPSULE ORAL 2 TIMES DAILY
Qty: 10 CAPSULE | Refills: 0 | Status: SHIPPED | OUTPATIENT
Start: 2022-08-17 | End: 2022-08-22

## 2022-08-17 RX ORDER — 0.9 % SODIUM CHLORIDE 0.9 %
1000 INTRAVENOUS SOLUTION INTRAVENOUS ONCE
Status: COMPLETED | OUTPATIENT
Start: 2022-08-17 | End: 2022-08-17

## 2022-08-17 RX ORDER — NITROFURANTOIN 25; 75 MG/1; MG/1
100 CAPSULE ORAL ONCE
Status: COMPLETED | OUTPATIENT
Start: 2022-08-17 | End: 2022-08-17

## 2022-08-17 RX ADMIN — NITROFURANTOIN (MONOHYDRATE/MACROCRYSTALS) 100 MG: 75; 25 CAPSULE ORAL at 04:25

## 2022-08-17 RX ADMIN — SODIUM CHLORIDE 1000 ML: 9 INJECTION, SOLUTION INTRAVENOUS at 01:27

## 2022-08-17 RX ADMIN — ONDANSETRON 4 MG: 2 INJECTION INTRAMUSCULAR; INTRAVENOUS at 02:24

## 2022-08-17 ASSESSMENT — ENCOUNTER SYMPTOMS
BLOOD IN STOOL: 0
DIARRHEA: 1
SINUS PRESSURE: 0
NAUSEA: 1
SINUS PAIN: 0
SHORTNESS OF BREATH: 0
BACK PAIN: 0
SORE THROAT: 0
ABDOMINAL PAIN: 1
VOMITING: 1
COUGH: 0

## 2022-08-17 NOTE — DISCHARGE INSTRUCTIONS
You were seen in the emergency department for nausea and vomiting. We feel that your symptoms are due to viral gastroenteritis as well as underlying urinary tract infection. We are prescribing you an antibiotic which should help with your symptoms. Please follow-up with your primary care provider in the next day or 2 regarding your symptoms and recovery. Please return to the emergency department if you develop fever, shortness of breath, ongoing or worsening abdominal pain, unable to tolerate drinking or eating, bloody stools or any new or concerning changes to your health.

## 2022-08-17 NOTE — ED PROVIDER NOTES
ATTENDING PHYSICIAN NOTE       Date of evaluation: 8/16/2022    Chief Complaint     Nausea (Vomiting since 8pm/)      History of Present Illness     Lupe Garcia is a 80 y.o. female who presents with nausea and vomiting. Symptoms started around 8 PM.  States that she had a heavier dinner fettuccine Jamal and shrimp from last night's [employer] takeout. Shortly afterwards started to get nauseous and had multiple episodes of nonbilious nonbloody emesis. She also reports multiple episodes of watery diarrhea. She endorses generalized abdominal pain. Denies any fever, cough, chest pain, shortness of breath, bloody stools, dysuria, hematuria or change in urinary frequency. She has not taken anything for her symptoms    Review of Systems     Review of Systems   Constitutional:  Positive for fatigue. Negative for chills and fever. HENT:  Negative for congestion, sinus pressure, sinus pain and sore throat. Eyes:  Negative for visual disturbance. Respiratory:  Negative for cough and shortness of breath. Cardiovascular:  Negative for chest pain, palpitations and leg swelling. Gastrointestinal:  Positive for abdominal pain, diarrhea, nausea and vomiting. Negative for blood in stool. Endocrine: Negative for polyuria. Genitourinary:  Negative for dysuria, flank pain, frequency and hematuria. Musculoskeletal:  Negative for back pain, neck pain and neck stiffness. Neurological:  Negative for dizziness, syncope, weakness, light-headedness and headaches. Psychiatric/Behavioral:  Negative for agitation and confusion.       Past Medical, Surgical, Family, and Social History     She has a past medical history of Amaurosis fugax of left eye, Benign neoplasm, Cancer of kidney ([address] Colonic polyp, Cyst of ovary, left, Dehydration, Diverticulosis of colon, Dyslipidemia, GERD (gastroesophageal reflux disease), Glaucoma, Hyperlipidemia, Hypertension, IBS (irritable bowel syndrome), Osteoporosis, Other specified gastritis without mention of hemorrhage, and Pericardial effusion. She has a past surgical history that includes Upper gastrointestinal endoscopy (02/11/1998); Colonoscopy (08/24/2009); Upper gastrointestinal endoscopy (09/10/1999); Colonoscopy (07/17/2001); Upper gastrointestinal endoscopy (07/17/2001); Upper gastrointestinal endoscopy (02/03/2004); Colonoscopy (08/29/2006); Upper gastrointestinal endoscopy (11/29/2007); partial nephrectomy (11/03/2010); Cholecystectomy, laparoscopic (02/11/1998); bronchoscopy (N/A, 11/04/2019); bronchoscopy (N/A, 11/4/2019); and bronchoscopy (11/4/2019). Her family history is not on file. She reports that she has never smoked. She has never used smokeless tobacco. She reports that she does not drink alcohol and does not use drugs. Medications     Discharge Medication List as of 8/17/2022  4:22 AM        CONTINUE these medications which have NOT CHANGED    Details   !! ondansetron (ZOFRAN ODT) 4 MG disintegrating tablet Take 1 tablet by mouth every 8 hours as needed for Nausea or Vomiting, Disp-8 tablet, R-0Print      atenolol (TENORMIN) 25 MG tablet Take 1 tablet by mouth daily. , Disp-30 tablet, R-3      amLODIPine (NORVASC) 10 MG tablet Take 10 mg by mouth daily. simvastatin (ZOCOR) 20 MG tablet Take 20 mg by mouth nightly. NASAL SALINE NA by Nasal route as needed       meclizine (ANTIVERT) 25 MG tablet Take 25 mg by mouth 3 times daily as needed. fluticasone (FLONASE) 50 MCG/ACT nasal spray 1 spray by Nasal route as needed       omeprazole (PRILOSEC) 20 MG capsule Take 20 mg by mouth daily. levothyroxine (SYNTHROID) 125 MCG tablet Take 125 mcg by mouth daily Historical Med      aspirin 81 MG EC tablet Take 81 mg by mouth daily. !! - Potential duplicate medications found. Please discuss with provider.           Allergies     She is allergic to avelox [moxifloxacin hydrochloride], benadryl [diphenhydramine hcl], biaxin [clarithromycin], cefuroxime, codeine, doxycycline, morphine, norgesic [orphenadrine-aspirin-caffeine], opium, penicillins, promethazine, propoxyphene, sulfa antibiotics, tetracyclines & related, and tramadol. Physical Exam     INITIAL VITALS: BP: (!) 129/50, Temp: 97.8 °F (36.6 °C), Heart Rate: 75, Resp: 16, SpO2: 95 %   Physical Exam  Vitals and nursing note reviewed. Constitutional:       General: She is not in acute distress. Appearance: She is ill-appearing. HENT:      Head: Normocephalic and atraumatic. Right Ear: External ear normal.      Left Ear: External ear normal.      Nose: Nose normal. No congestion or rhinorrhea. Mouth/Throat:      Mouth: Mucous membranes are dry. Pharynx: No oropharyngeal exudate or posterior oropharyngeal erythema. Eyes:      Conjunctiva/sclera: Conjunctivae normal.   Cardiovascular:      Rate and Rhythm: Normal rate and regular rhythm. Pulses: Normal pulses. Heart sounds: Normal heart sounds. Pulmonary:      Effort: Pulmonary effort is normal.      Breath sounds: Normal breath sounds. Abdominal:      General: There is no distension. Palpations: Abdomen is soft. Tenderness: There is no abdominal tenderness. There is no right CVA tenderness, left CVA tenderness, guarding or rebound. Musculoskeletal:         General: Normal range of motion. Cervical back: Normal range of motion and neck supple. No rigidity. Right lower leg: No edema. Left lower leg: No edema. Skin:     General: Skin is warm. Capillary Refill: Capillary refill takes less than 2 seconds. Coloration: Skin is not jaundiced or pale. Findings: No erythema. Neurological:      General: No focal deficit present. Mental Status: Mental status is at baseline. Cranial Nerves: No cranial nerve deficit. Sensory: No sensory deficit. Motor: No weakness.    Psychiatric:         Mood and Affect: Mood normal.         Behavior: Behavior normal. Diagnostic Results     EKG   NSR, rate 83, normal intervals, no ST or T wave changes concerning for ischemia    RADIOLOGY:  CT ABDOMEN PELVIS WO CONTRAST Additional Contrast? None   Final Result   1. Moderate amount of pericardial effusion is again noted. There is not a   significant pleural effusion at this time. 2.  Urinary bladder is under distended but the wall appears thickened and   concerning for cystitis. Correlate with urinalysis. 3.  Development or increase of left hip joint effusion and thickening of the   joint capsule. No erosions are seen along the bones. Correlate for signs of   infection and can consider aspiration.       4.  May have some residual thickening along the gastric wall at the fundus   and GE junction but appears less prominent than the prior exam.             LABS:   Results for orders placed or performed during the hospital encounter of 08/17/22   CBC with Auto Differential   Result Value Ref Range    WBC 7.7 4.0 - 11.0 K/uL    RBC 3.71 (L) 4.00 - 5.20 M/uL    Hemoglobin 11.8 (L) 12.0 - 16.0 g/dL    Hematocrit 34.7 (L) 36.0 - 48.0 %    MCV 93.6 80.0 - 100.0 fL    MCH 31.6 26.0 - 34.0 pg    MCHC 33.8 31.0 - 36.0 g/dL    RDW 13.6 12.4 - 15.4 %    Platelets 292 231 - 010 K/uL    MPV 8.2 5.0 - 10.5 fL    Neutrophils % 84.2 %    Lymphocytes % 5.4 %    Monocytes % 9.6 %    Eosinophils % 0.6 %    Basophils % 0.2 %    Neutrophils Absolute 6.5 1.7 - 7.7 K/uL    Lymphocytes Absolute 0.4 (L) 1.0 - 5.1 K/uL    Monocytes Absolute 0.7 0.0 - 1.3 K/uL    Eosinophils Absolute 0.0 0.0 - 0.6 K/uL    Basophils Absolute 0.0 0.0 - 0.2 K/uL   Comprehensive Metabolic Panel   Result Value Ref Range    Sodium 133 (L) 136 - 145 mmol/L    Potassium 3.6 3.5 - 5.1 mmol/L    Chloride 99 99 - 110 mmol/L    CO2 20 (L) 21 - 32 mmol/L    Anion Gap 14 3 - 16    Glucose 136 (H) 70 - 99 mg/dL    BUN 58 (H) 7 - 20 mg/dL    Creatinine 1.5 (H) 0.6 - 1.2 mg/dL    GFR Non- 32 (A) >60    GFR  39 (A) >60    Calcium 9.5 8.3 - 10.6 mg/dL    Total Protein 7.4 6.4 - 8.2 g/dL    Albumin 4.1 3.4 - 5.0 g/dL    Albumin/Globulin Ratio 1.2 1.1 - 2.2    Total Bilirubin <0.2 0.0 - 1.0 mg/dL    Alkaline Phosphatase 115 40 - 129 U/L    ALT 44 (H) 10 - 40 U/L    AST 33 15 - 37 U/L   Lipase   Result Value Ref Range    Lipase 39.0 13.0 - 60.0 U/L   Urinalysis with Reflex to Culture    Specimen: Urine   Result Value Ref Range    Color, UA Yellow Straw/Yellow    Clarity, UA Clear Clear    Glucose, Ur Negative Negative mg/dL    Bilirubin Urine Negative Negative    Ketones, Urine Negative Negative mg/dL    Specific Gravity, UA 1.015 1.005 - 1.030    Blood, Urine Negative Negative    pH, UA 5.5 5.0 - 8.0    Protein, UA Negative Negative mg/dL    Urobilinogen, Urine 0.2 <2.0 E.U./dL    Nitrite, Urine Negative Negative    Leukocyte Esterase, Urine SMALL (A) Negative    Microscopic Examination YES     Urine Type NotGiven     Urine Reflex to Culture Yes    Troponin   Result Value Ref Range    Troponin <0.01 <0.01 ng/mL   Microscopic Urinalysis   Result Value Ref Range    WBC, UA  (A) 0 - 5 /HPF    RBC, UA 3-4 0 - 4 /HPF    Epithelial Cells, UA 2-5 0 - 5 /HPF    Bacteria, UA 1+ (A) None Seen /HPF   EKG 12 Lead   Result Value Ref Range    Ventricular Rate 83 BPM    Atrial Rate 83 BPM    P-R Interval 170 ms    QRS Duration 72 ms    Q-T Interval 354 ms    QTc Calculation (Bazett) 415 ms    P Axis 39 degrees    R Axis 56 degrees    T Axis 84 degrees    Diagnosis       Normal sinus rhythmNonspecific T wave abnormalityAbnormal ECGWhen compared with ECG of 04-NOV-2021 18:18,Premature ventricular complexes are no longer PresentNonspecific T wave abnormality now evident in Lateral leads       ED BEDSIDE ULTRASOUND:  No results found.     RECENT VITALS:  BP: 139/73,Temp: 97.8 °F (36.6 °C), Heart Rate: 82, Resp: 16, SpO2: 93 %     Procedures         ED Course     Nursing Notes, Past Medical Hx, Past Surgical Hx, Social Given workup above patients symptoms likely 2/2 acute cystitis and gastroenteritis. CT with moderate pericardial effusion however she has history of this in the past and aware of this condition. I discussed antibiotic treatment with pharmacy given amount of allergies. No known issue with macrobid therefore provided prescription for macrobid BID x 5 days. Patient stable for discharge home. All questions and concerns addressed. Strict return precautions reviewed. Clinical Impression     1. Nausea    2. Gastroenteritis    3. Acute cystitis without hematuria        Disposition     PATIENT REFERRED TO:  Curt Gomez MD  [address] [place] [id number]  [phone]    In 1 day      DISCHARGE MEDICATIONS:  Discharge Medication List as of 8/17/2022  4:22 AM        START taking these medications    Details   nitrofurantoin, macrocrystal-monohydrate, (MACROBID) 100 MG capsule Take 1 capsule by mouth 2 times daily for 5 days, Disp-10 capsule, R-0Normal      !! ondansetron (ZOFRAN ODT) 4 MG disintegrating tablet Take 1 tablet by mouth every 8 hours as needed for Nausea, Disp-12 tablet, R-0Normal       !! - Potential duplicate medications found. Please discuss with provider.           DISPOSITION Decision To Discharge 08/17/2022 04:33:06 AM          Sarah Resendiz MD  08/17/22 [id number]

## 2022-08-18 LAB
EKG ATRIAL RATE: 83 BPM
EKG DIAGNOSIS: NORMAL
EKG P AXIS: 39 DEGREES
EKG P-R INTERVAL: 170 MS
EKG Q-T INTERVAL: 354 MS
EKG QRS DURATION: 72 MS
EKG QTC CALCULATION (BAZETT): 415 MS
EKG R AXIS: 56 DEGREES
EKG T AXIS: 84 DEGREES
EKG VENTRICULAR RATE: 83 BPM
ORGANISM: ABNORMAL
URINE CULTURE, ROUTINE: ABNORMAL

## 2022-08-18 PROCEDURE — 93010 ELECTROCARDIOGRAM REPORT: CPT | Performed by: INTERNAL MEDICINE

## 2022-08-19 NOTE — RESULT ENCOUNTER NOTE
Culture reviewed, culture is positive, M unsure if the patient was discharged on an appropriate antibiotic.   If not start Keflex 500 mg p.o. 3 times daily x5 days

## 2022-11-19 ENCOUNTER — APPOINTMENT (OUTPATIENT)
Dept: CT IMAGING | Age: 87
End: 2022-11-19
Payer: MEDICARE

## 2022-11-19 ENCOUNTER — HOSPITAL ENCOUNTER (EMERGENCY)
Age: 87
Discharge: HOME | End: 2022-11-19
Payer: MEDICARE

## 2022-11-19 ENCOUNTER — APPOINTMENT (OUTPATIENT)
Dept: GENERAL RADIOLOGY | Age: 87
End: 2022-11-19
Payer: MEDICARE

## 2022-11-19 VITALS
RESPIRATION RATE: 16 BRPM | DIASTOLIC BLOOD PRESSURE: 71 MMHG | TEMPERATURE: 98.1 F | SYSTOLIC BLOOD PRESSURE: 173 MMHG | OXYGEN SATURATION: 94 % | HEART RATE: 74 BPM

## 2022-11-19 DIAGNOSIS — K56.41 FECAL IMPACTION IN RECTUM (HCC): Primary | ICD-10-CM

## 2022-11-19 LAB
A/G RATIO: 1.1 (ref 1.1–2.2)
ALBUMIN SERPL-MCNC: 4 G/DL (ref 3.4–5)
ALP BLD-CCNC: 93 U/L (ref 40–129)
ALT SERPL-CCNC: 13 U/L (ref 10–40)
ANION GAP SERPL CALCULATED.3IONS-SCNC: 12 MMOL/L (ref 3–16)
AST SERPL-CCNC: 16 U/L (ref 15–37)
BASOPHILS ABSOLUTE: 0 K/UL (ref 0–0.2)
BASOPHILS RELATIVE PERCENT: 0.5 %
BILIRUB SERPL-MCNC: 0.3 MG/DL (ref 0–1)
BILIRUBIN URINE: NEGATIVE
BLOOD, URINE: NEGATIVE
BUN BLDV-MCNC: 40 MG/DL (ref 7–20)
CALCIUM SERPL-MCNC: 9.1 MG/DL (ref 8.3–10.6)
CHLORIDE BLD-SCNC: 95 MMOL/L (ref 99–110)
CLARITY: CLEAR
CO2: 27 MMOL/L (ref 21–32)
COLOR: YELLOW
CREAT SERPL-MCNC: 1.1 MG/DL (ref 0.6–1.2)
EOSINOPHILS ABSOLUTE: 0 K/UL (ref 0–0.6)
EOSINOPHILS RELATIVE PERCENT: 0.3 %
GFR SERPL CREATININE-BSD FRML MDRD: 46 ML/MIN/{1.73_M2}
GLUCOSE BLD-MCNC: 114 MG/DL (ref 70–99)
GLUCOSE URINE: NEGATIVE MG/DL
HCT VFR BLD CALC: 37.4 % (ref 36–48)
HEMOGLOBIN: 13 G/DL (ref 12–16)
KETONES, URINE: NEGATIVE MG/DL
LACTIC ACID, SEPSIS: 1.3 MMOL/L (ref 0.4–1.9)
LEUKOCYTE ESTERASE, URINE: NEGATIVE
LIPASE: 15 U/L (ref 13–60)
LYMPHOCYTES ABSOLUTE: 1.1 K/UL (ref 1–5.1)
LYMPHOCYTES RELATIVE PERCENT: 12.9 %
MAGNESIUM: 2.7 MG/DL (ref 1.8–2.4)
MCH RBC QN AUTO: 32.2 PG (ref 26–34)
MCHC RBC AUTO-ENTMCNC: 34.7 G/DL (ref 31–36)
MCV RBC AUTO: 92.6 FL (ref 80–100)
MICROSCOPIC EXAMINATION: NORMAL
MONOCYTES ABSOLUTE: 0.6 K/UL (ref 0–1.3)
MONOCYTES RELATIVE PERCENT: 7.6 %
NEUTROPHILS ABSOLUTE: 6.6 K/UL (ref 1.7–7.7)
NEUTROPHILS RELATIVE PERCENT: 78.7 %
NITRITE, URINE: NEGATIVE
PDW BLD-RTO: 12.8 % (ref 12.4–15.4)
PH UA: 6.5 (ref 5–8)
PLATELET # BLD: 220 K/UL (ref 135–450)
PMV BLD AUTO: 8.4 FL (ref 5–10.5)
POTASSIUM SERPL-SCNC: 3.9 MMOL/L (ref 3.5–5.1)
PROTEIN UA: NEGATIVE MG/DL
RBC # BLD: 4.03 M/UL (ref 4–5.2)
SODIUM BLD-SCNC: 134 MMOL/L (ref 136–145)
SPECIFIC GRAVITY UA: 1.01 (ref 1–1.03)
TOTAL PROTEIN: 7.5 G/DL (ref 6.4–8.2)
URINE REFLEX TO CULTURE: NORMAL
URINE TYPE: NORMAL
UROBILINOGEN, URINE: 0.2 E.U./DL
WBC # BLD: 8.4 K/UL (ref 4–11)

## 2022-11-19 PROCEDURE — 2580000003 HC RX 258: Performed by: NURSE PRACTITIONER

## 2022-11-19 PROCEDURE — 81003 URINALYSIS AUTO W/O SCOPE: CPT

## 2022-11-19 PROCEDURE — 83690 ASSAY OF LIPASE: CPT

## 2022-11-19 PROCEDURE — 99285 EMERGENCY DEPT VISIT HI MDM: CPT

## 2022-11-19 PROCEDURE — 71260 CT THORAX DX C+: CPT

## 2022-11-19 PROCEDURE — 6360000004 HC RX CONTRAST MEDICATION: Performed by: NURSE PRACTITIONER

## 2022-11-19 PROCEDURE — 83735 ASSAY OF MAGNESIUM: CPT

## 2022-11-19 PROCEDURE — 80053 COMPREHEN METABOLIC PANEL: CPT

## 2022-11-19 PROCEDURE — 96361 HYDRATE IV INFUSION ADD-ON: CPT

## 2022-11-19 PROCEDURE — 96360 HYDRATION IV INFUSION INIT: CPT

## 2022-11-19 PROCEDURE — 83605 ASSAY OF LACTIC ACID: CPT

## 2022-11-19 PROCEDURE — 85025 COMPLETE CBC W/AUTO DIFF WBC: CPT

## 2022-11-19 PROCEDURE — 74018 RADEX ABDOMEN 1 VIEW: CPT

## 2022-11-19 RX ORDER — 0.9 % SODIUM CHLORIDE 0.9 %
1000 INTRAVENOUS SOLUTION INTRAVENOUS ONCE
Status: COMPLETED | OUTPATIENT
Start: 2022-11-19 | End: 2022-11-19

## 2022-11-19 RX ORDER — MAG HYDROX/ALUMINUM HYD/SIMETH 400-400-40
1 SUSPENSION, ORAL (FINAL DOSE FORM) ORAL ONCE
Qty: 1 SUPPOSITORY | Refills: 0 | Status: SHIPPED | OUTPATIENT
Start: 2022-11-19 | End: 2022-11-19

## 2022-11-19 RX ORDER — SENNA PLUS 8.6 MG/1
1 TABLET ORAL 2 TIMES DAILY
Qty: 60 TABLET | Refills: 11 | Status: SHIPPED | OUTPATIENT
Start: 2022-11-19 | End: 2023-11-19

## 2022-11-19 RX ORDER — SODIUM PHOSPHATE, DIBASIC AND SODIUM PHOSPHATE, MONOBASIC 7; 19 G/133ML; G/133ML
1 ENEMA RECTAL
Qty: 1 EACH | Refills: 0 | Status: SHIPPED | OUTPATIENT
Start: 2022-11-19 | End: 2022-11-19

## 2022-11-19 RX ORDER — POLYETHYLENE GLYCOL 3350 17 G/17G
17 POWDER, FOR SOLUTION ORAL DAILY
Qty: 238 G | Refills: 0 | Status: SHIPPED | OUTPATIENT
Start: 2022-11-19 | End: 2022-11-26

## 2022-11-19 RX ADMIN — IOPAMIDOL 75 ML: 755 INJECTION, SOLUTION INTRAVENOUS at 15:21

## 2022-11-19 RX ADMIN — SODIUM CHLORIDE 1000 ML: 9 INJECTION, SOLUTION INTRAVENOUS at 13:07

## 2022-11-19 ASSESSMENT — PAIN - FUNCTIONAL ASSESSMENT: PAIN_FUNCTIONAL_ASSESSMENT: NONE - DENIES PAIN

## 2022-11-19 NOTE — ED PROVIDER NOTES
[facility] Emergency Department    CHIEF COMPLAINT  Ingestion (Took a cupful of milk of magnesia instead of measuring to the line. Now has diarrhea. )      HISTORY OF PRESENT ILLNESS  Ritu Mayfield is a 80 y.o. female who presents to the ED complaining of diarrhea. Patient reports that she has had diarrhea since Tuesday. She had not had a bowel movement in 4 to 5 days this concerned her so she took 2 oral stool softeners and a capful of milk of magnesia. Patient reports she started to have diarrhea on Tuesday shortly after taking the medication and has had diarrhea ever since. To the point where she has a small amount of diarrhea every time she even stands up and is having to wear depends. She reports she is now feeling generalized weakness, fatigue, chills. Also felt like she was \"wheezing in her chest.\"  Denies measurable fever, chest pain, shortness of breath, cough, abdominal pain, emesis, urinary complaints, flank pain. No other complaints, modifying factors or associated symptoms. Nursing notes reviewed.    Past Medical History:   Diagnosis Date    Amaurosis fugax of left eye 3/15/2011    Benign neoplasm     colon    Cancer of kidney ([facility] [address] 11/03/2010    Right    Colonic polyp 1999    Cyst of ovary, left 9/2/2012    Dehydration 2/9/2011    Diverticulosis of colon 1999    Dyslipidemia 2008    GERD (gastroesophageal reflux disease)     Glaucoma     Hyperlipidemia     Hypertension 1960    IBS (irritable bowel syndrome)     Osteoporosis     Other specified gastritis without mention of hemorrhage 1998    Pericardial effusion 8/23/2011     Past Surgical History:   Procedure Laterality Date    BRONCHOSCOPY N/A 11/04/2019    BRONCHOSCOPY N/A 11/4/2019    BRONCHOSCOPY ALVEOLAR LAVAGE performed by Tyler Siu MD at [address]  11/4/2019    BRONCHOSCOPY THERAPUTIC ASPIRATION INITIAL performed by Tyler Siu MD at [address] [place] [address] CHOLECYSTECTOMY, LAPAROSCOPIC  02/11/1998    COLONOSCOPY  08/24/2009    TA & Diverticulosis    COLONOSCOPY  07/17/2001    diverticulosis    COLONOSCOPY  08/29/2006    diverticulosis    PARTIAL NEPHRECTOMY  11/03/2010    Right    UPPER GASTROINTESTINAL ENDOSCOPY  02/11/1998    chronic gastritis    UPPER GASTROINTESTINAL ENDOSCOPY  09/10/1999    chronic gastritis    UPPER GASTROINTESTINAL ENDOSCOPY  07/17/2001    chronic gastritis w intestinal metaplasia    UPPER GASTROINTESTINAL ENDOSCOPY  02/03/2004    chronic gastritis w intestinal metaplasia    UPPER GASTROINTESTINAL ENDOSCOPY  11/29/2007    gastritis     History reviewed. No pertinent family history. Social History     Socioeconomic History    Marital status:      Spouse name: Not on file    Number of children: Not on file    Years of education: Not on file    Highest education level: Not on file   Occupational History    Not on file   Tobacco Use    Smoking status: Never    Smokeless tobacco: Never   Vaping Use    Vaping Use: Not on file   Substance and Sexual Activity    Alcohol use: No    Drug use: No    Sexual activity: Never   Other Topics Concern    Not on file   Social History Narrative    Not on file     Social Determinants of Health     Financial Resource Strain: Not on file   Food Insecurity: Not on file   Transportation Needs: Not on file   Physical Activity: Not on file   Stress: Not on file   Social Connections: Not on file   Intimate Partner Violence: Not on file   Housing Stability: Not on file     No current facility-administered medications for this encounter.      Current Outpatient Medications   Medication Sig Dispense Refill    polyethylene glycol (MIRALAX) 17 GM/SCOOP powder Take 17 g by mouth daily for 7 days PRN constipation 238 g 0    senna (SENOKOT) 8.6 MG tablet Take 1 tablet by mouth 2 times daily 60 tablet 11    glycerin 2 g suppository Place 1 suppository rectally once for 1 dose 1 suppository 0    sodium phosphate (FLEET) 7-19 GM/118ML Place 1 enema rectally once as needed (constipation) 1 each 0    ondansetron (ZOFRAN ODT) 4 MG disintegrating tablet Take 1 tablet by mouth every 8 hours as needed for Nausea 12 tablet 0    ondansetron (ZOFRAN ODT) 4 MG disintegrating tablet Take 1 tablet by mouth every 8 hours as needed for Nausea or Vomiting 8 tablet 0    atenolol (TENORMIN) 25 MG tablet Take 1 tablet by mouth daily. 30 tablet 3    amLODIPine (NORVASC) 10 MG tablet Take 10 mg by mouth daily. simvastatin (ZOCOR) 20 MG tablet Take 20 mg by mouth nightly. NASAL SALINE NA by Nasal route as needed       meclizine (ANTIVERT) 25 MG tablet Take 25 mg by mouth 3 times daily as needed. fluticasone (FLONASE) 50 MCG/ACT nasal spray 1 spray by Nasal route as needed       omeprazole (PRILOSEC) 20 MG capsule Take 20 mg by mouth daily. levothyroxine (SYNTHROID) 125 MCG tablet Take 125 mcg by mouth daily       aspirin 81 MG EC tablet Take 81 mg by mouth daily. Allergies   Allergen Reactions    Avelox [Moxifloxacin Hydrochloride]     Benadryl [Diphenhydramine Hcl]     Biaxin [Clarithromycin]     Cefuroxime     Codeine     Doxycycline     Morphine     Norgesic [Orphenadrine-Aspirin-Caffeine]     Opium     Penicillins     Promethazine     Propoxyphene     Sulfa Antibiotics     Tetracyclines & Related     Tramadol        REVIEW OF SYSTEMS  10 systems reviewed, pertinent positives per HPI otherwise noted to be negative    PHYSICAL EXAM  BP (!) 173/71   Pulse 74   Temp 98.1 °F (36.7 °C) (Oral)   Resp 16   SpO2 94%   GENERAL APPEARANCE: Awake and alert. Cooperative. No acute distress. Vital signs are stable. Well appearing and non toxic. HEAD: Normocephalic. Atraumatic. EYES: PERRL. EOM's grossly intact. ENT: Mucous membranes are dry. NECK: Supple. Normal ROM. HEART: RRR. Distal pulses are equal and intact. Cap refill less than 2 seconds. LUNGS: Respirations unlabored. CTAB. Good air exchange.  Speaking comfortably in full sentences. No wheezing, rhonchi, rales, stridor. ABDOMEN: Soft. Non-distended. Non-tender. No guarding or rebound. No rigidity. Bowel sounds are present. Negative shane's. Negative McBurney's point. Negative CVA tenderness. EXTREMITIES: No peripheral edema. Moves all extremities equally. All extremities neurovascularly intact. SKIN: Warm and dry. No acute rashes. NEUROLOGICAL: Alert and oriented. No gross facial drooping. Strength 5/5, sensation intact. Speech is clear. PSYCHIATRIC: Normal mood and affect. SCREENINGS       RADIOLOGY  XR ABDOMEN (KUB) (SINGLE AP VIEW)    Result Date: 11/19/2022  EXAMINATION: ONE SUPINE XRAY VIEW(S) OF THE ABDOMEN 11/19/2022 5:23 pm COMPARISON: 11/19/2022 HISTORY: ORDERING SYSTEM PROVIDED HISTORY: After disimpaction TECHNOLOGIST PROVIDED HISTORY: Reason for exam:->After disimpaction Reason for Exam: post disimpaction FINDINGS: Cardiac silhouette is moderately enlarged. The small and large bowel are gaseous. There is perhaps mild prominence of stool in the sigmoid colon. Previous rectal prominence of stool is no longer appreciated. No dilated segments of bowel are identified. No free air. Cholecystectomy clips. Rectal fecal impaction is no longer evident. Nonspecific, nonobstructive bowel gas pattern. CT CHEST ABDOMEN PELVIS W CONTRAST Additional Contrast? None    Result Date: 11/19/2022  EXAMINATION: CT OF THE CHEST, ABDOMEN, AND PELVIS WITH CONTRAST 11/19/2022 3:06 pm TECHNIQUE: CT of the chest, abdomen and pelvis was performed with the administration of intravenous contrast. Multiplanar reformatted images are provided for review. Automated exposure control, iterative reconstruction, and/or weight based adjustment of the mA/kV was utilized to reduce the radiation dose to as low as reasonably achievable.  COMPARISON: 2021 2019 HISTORY: ORDERING SYSTEM PROVIDED HISTORY: cough chills diarrhea TECHNOLOGIST PROVIDED HISTORY: Reason for exam:->cough chills diarrhea Additional Contrast?->None Decision Support Exception - unselect if not a suspected or confirmed emergency medical condition->Emergency Medical Condition (MA) Reason for Exam: chills-diarrhea x 1 week-nausea-cough/wheezing--denies cp-sob Additional signs and symptoms: hx-renal ca Relevant Medical/Surgical History: partial nephrectomy-gb removed FINDINGS: Chest: Mediastinum: Atherosclerotic change seen in aorta. Small pericardial effusion is seen. Thickness marginating the left ventricle measures 2.1 cm. Small hiatal hernia is seen. There is nonspecific thickening at the GE junction. .  Small mediastinal and hilar nodes are noted Lungs/pleura: Linear and curvilinear opacities are seen in the apices, compatible with scarring, similar to prior. Scattered areas of bronchial wall thickening are seen. De pendant opacity is seen at the lung bases likely atelectasis. Triangular nodule along the minor fissure on the right is unchanged. . Fibroglandular nodularity in the apices is redemonstrated, with a questionable new area of nodularity in the right upper lobe on image number 17, measuring 8 mm. Trace pleural fluid is seen Soft Tissues/Bones: Spurring is seen in the spine. Spurring is seen in the shoulder joints Abdomen/Pelvis: Organs: No splenomegaly. No perisplenic fluid. Adrenal glands unremarkable. There is lobular contour left kidney. No hydronephrosis. There is scarring of the right kidney. No hydronephrosis noted. There are clips from prior cholecystectomy. There is trace intrahepatic biliary ductal dilatation. No perihepatic fluid No peripancreatic fluid. No peripancreatic inflammatory change. GI/Bowel: No significant small bowel distention noted. Scattered colonic diverticula are seen. Large stool load is seen in the colon. .  No significant pericolonic fat stranding Pelvis: Large stool load seen in the rectum. There is mild injection of the perirectal fat. . No pelvic adenopathy. Peritoneum/Retroperitoneum: Atherosclerotic change seen in aorta. No aneurysm. No retroperitoneal adenopathy. Atherosclerotic change seen in the iliacs. Bones/Soft Tissues: Spurring is seen in the spine. Spurring is seen hips. There is distention of the iliopsoas bursa on the left, incidentally noted     Chest Nodularity in the apices is seen, with a questionable new area of nodularity in the right lung apex compared to 2019. This new area of nodularity may simply be due to scarring or postinflammatory-infectious change rather than neoplastic etiology. Recommend short-term follow-up noncontrast chest CT after treatment Trace pleural fluid, suggesting mild fluid overload Small pericardial effusion Abdomen and pelvis Findings of fecal impaction. Perirectal fat stranding is nonspecific but could be an early finding of stercoral colitis. Scattered colonic diverticula are seen. Large stool load throughout the remainder of the colon. ED COURSE/MDM  Patient seen and evaluated. Old records reviewed. Diagnostic testing reviewed and results discussed. I have independently evaluated this patient based upon my scope of practice. Supervising physician was in the department for consultation as needed. Karen Mancera presented to the ED today with above noted complaints. Upon arrival to emergency department vital signs are stable. Patient initially given a liter of sodium chloride. Blood work is unremarkable no leukocytosis, bandemia, anemia, acute kidney injury, significant electrolyte abnormality, transaminitis or sign of pancreatitis. Urinalysis negative. CT of the abdomen and pelvis shows findings of fecal impaction. Digital rectal disimpaction performed by myself. With complete resolution of fecal impaction. Patient tolerated well. Able to have bowel movements on her own as well.     Patient discharged home on bowel regimen and close follow-up with PCP patient and daughter agreeable with plan of care. While in ED patient received   Medications   0.9 % sodium chloride bolus (0 mLs IntraVENous Stopped 11/19/22 1508)   iopamidol (ISOVUE-370) 76 % injection 75 mL (75 mLs IntraVENous Given 11/19/22 1521)             At this point I do not feel the patient requires further work up and it is reasonable to discharge the patient. A discussion was had with the patient and/or their surrogate regarding diagnosis, diagnostic testing results, treatment/ plan of care, and follow up. There was shared decision-making between myself as well as the patient and/or their surrogate and we are all in agreement with discharge home. There was an opportunity for questions and all questions were answered to the best of my ability and to the satisfaction of the patient and/or patient family. Patient will follow up with pcp for further evaluation/treatment. The patient was given strict return precautions as we discussed symptoms that would necessitate return to the ED. Patient will return to ED for new/worsening symptoms. The patient verbalized their understanding and agreement with the above plan. Please refer to AVS for further details regarding discharge instructions.       Results for orders placed or performed during the hospital encounter of 11/19/22   CBC with Auto Differential   Result Value Ref Range    WBC 8.4 4.0 - 11.0 K/uL    RBC 4.03 4.00 - 5.20 M/uL    Hemoglobin 13.0 12.0 - 16.0 g/dL    Hematocrit 37.4 36.0 - 48.0 %    MCV 92.6 80.0 - 100.0 fL    MCH 32.2 26.0 - 34.0 pg    MCHC 34.7 31.0 - 36.0 g/dL    RDW 12.8 12.4 - 15.4 %    Platelets 618 221 - 123 K/uL    MPV 8.4 5.0 - 10.5 fL    Neutrophils % 78.7 %    Lymphocytes % 12.9 %    Monocytes % 7.6 %    Eosinophils % 0.3 %    Basophils % 0.5 %    Neutrophils Absolute 6.6 1.7 - 7.7 K/uL    Lymphocytes Absolute 1.1 1.0 - 5.1 K/uL    Monocytes Absolute 0.6 0.0 - 1.3 K/uL    Eosinophils Absolute 0.0 0.0 - 0.6 K/uL    Basophils Absolute 0.0 0.0 - 0.2 K/uL   Comprehensive Metabolic Panel   Result Value Ref Range    Sodium 134 (L) 136 - 145 mmol/L    Potassium 3.9 3.5 - 5.1 mmol/L    Chloride 95 (L) 99 - 110 mmol/L    CO2 27 21 - 32 mmol/L    Anion Gap 12 3 - 16    Glucose 114 (H) 70 - 99 mg/dL    BUN 40 (H) 7 - 20 mg/dL    Creatinine 1.1 0.6 - 1.2 mg/dL    Est, Glom Filt Rate 46 (A) >60    Calcium 9.1 8.3 - 10.6 mg/dL    Total Protein 7.5 6.4 - 8.2 g/dL    Albumin 4.0 3.4 - 5.0 g/dL    Albumin/Globulin Ratio 1.1 1.1 - 2.2    Total Bilirubin 0.3 0.0 - 1.0 mg/dL    Alkaline Phosphatase 93 40 - 129 U/L    ALT 13 10 - 40 U/L    AST 16 15 - 37 U/L   Lipase   Result Value Ref Range    Lipase 15.0 13.0 - 60.0 U/L   Magnesium   Result Value Ref Range    Magnesium 2.70 (H) 1.80 - 2.40 mg/dL   Lactate, Sepsis   Result Value Ref Range    Lactic Acid, Sepsis 1.3 0.4 - 1.9 mmol/L   Urinalysis with Reflex to Culture    Specimen: Urine   Result Value Ref Range    Color, UA Yellow Straw/Yellow    Clarity, UA Clear Clear    Glucose, Ur Negative Negative mg/dL    Bilirubin Urine Negative Negative    Ketones, Urine Negative Negative mg/dL    Specific Gravity, UA 1.010 1.005 - 1.030    Blood, Urine Negative Negative    pH, UA 6.5 5.0 - 8.0    Protein, UA Negative Negative mg/dL    Urobilinogen, Urine 0.2 <2.0 E.U./dL    Nitrite, Urine Negative Negative    Leukocyte Esterase, Urine Negative Negative    Microscopic Examination Not Indicated     Urine Type NotGiven     Urine Reflex to Culture Not Indicated        I estimate there is LOW risk for ACUTE APPENDICITIS, BOWEL OBSTRUCTION, CHOLECYSTITIS, DIVERTICULITIS, INCARCERATED HERNIA, PANCREATITIS, PELVIC INFLAMMATORY DISEASE, PERFORATED BOWEL or ULCER, PREGNANCY, or TUBO-OVARIAN ABSCESS, thus I consider the discharge disposition reasonable. Also, there is no evidence or peritonitis, sepsis, or toxicity.  Mohan Parks and I have discussed the diagnosis and risks, and we agree with discharging home to follow-up with their primary doctor. We also discussed returning to the Emergency Department immediately if new or worsening symptoms occur. We have discussed the symptoms which are most concerning (e.g., bloody stool, fever, changing or worsening pain, vomiting) that necessitate immediate return. Final Impression    1. Fecal impaction in rectum (HCC)        Blood pressure (!) 173/71, pulse 74, temperature 98.1 °F (36.7 °C), temperature source Oral, resp. rate 16, SpO2 94 %. mdm    Patient was sent home with a prescription for below medication/s. I did [place] patient on appropriate use of these medication. New Prescriptions    GLYCERIN 2 G SUPPOSITORY    Place 1 suppository rectally once for 1 dose    POLYETHYLENE GLYCOL (MIRALAX) 17 GM/SCOOP POWDER    Take 17 g by mouth daily for 7 days PRN constipation    SENNA (SENOKOT) 8.6 MG TABLET    Take 1 tablet by mouth 2 times daily    SODIUM PHOSPHATE (FLEET) 7-19 GM/118ML    Place 1 enema rectally once as needed (constipation)           FOLLOW UP  Bianca Perea MD  [place] [id number]  [id number] [facility]  [address]        DISPOSITION  Patient was discharged to home in good condition. Comment: Please note this report has been produced using speech recognition software and may contain errors related to that system including errors in grammar, punctuation, and spelling, as well as words and phrases that may be inappropriate. If there are any questions or concerns please feel free to contact the dictating provider for clarification.            GERRY Saldana - CNP  11/19/22 2300

## 2022-11-19 NOTE — ED NOTES
Pt discharged in stable condition. Pt had no further questions at this time.       Constance Okeefe, HUGO  11/19/22 1556

## 2024-07-09 ENCOUNTER — HOSPITAL ENCOUNTER (INPATIENT)
Age: 89
LOS: 5 days | Discharge: HOME | DRG: 242 | End: 2024-07-14
Attending: EMERGENCY MEDICINE | Admitting: INTERNAL MEDICINE
Payer: MEDICARE

## 2024-07-09 ENCOUNTER — APPOINTMENT (OUTPATIENT)
Dept: GENERAL RADIOLOGY | Age: 89
DRG: 242 | End: 2024-07-09
Payer: MEDICARE

## 2024-07-09 ENCOUNTER — APPOINTMENT (OUTPATIENT)
Age: 89
DRG: 242 | End: 2024-07-09
Attending: INTERNAL MEDICINE
Payer: MEDICARE

## 2024-07-09 DIAGNOSIS — R00.1 BRADYCARDIA: Primary | ICD-10-CM

## 2024-07-09 DIAGNOSIS — I31.39 CHRONIC PERICARDIAL EFFUSION: ICD-10-CM

## 2024-07-09 DIAGNOSIS — R19.7 DIARRHEA, UNSPECIFIED TYPE: ICD-10-CM

## 2024-07-09 DIAGNOSIS — I31.39 PERICARDIAL EFFUSION: ICD-10-CM

## 2024-07-09 DIAGNOSIS — I49.9 ARRHYTHMIA: ICD-10-CM

## 2024-07-09 DIAGNOSIS — R53.83 OTHER FATIGUE: ICD-10-CM

## 2024-07-09 DIAGNOSIS — N18.9 CHRONIC KIDNEY DISEASE, UNSPECIFIED CKD STAGE: ICD-10-CM

## 2024-07-09 LAB
ALBUMIN SERPL-MCNC: 3.8 G/DL (ref 3.4–5)
ALBUMIN/GLOB SERPL: 1.1 {RATIO} (ref 1.1–2.2)
ALP SERPL-CCNC: 95 U/L (ref 40–129)
ALT SERPL-CCNC: 11 U/L (ref 10–40)
ANION GAP SERPL CALCULATED.3IONS-SCNC: 13 MMOL/L (ref 3–16)
AST SERPL-CCNC: 20 U/L (ref 15–37)
BASE EXCESS BLDV CALC-SCNC: -3.7 MMOL/L (ref -3–3)
BASOPHILS # BLD: 0.1 K/UL (ref 0–0.2)
BASOPHILS NFR BLD: 1 %
BILIRUB SERPL-MCNC: 0.3 MG/DL (ref 0–1)
BILIRUB UR QL STRIP.AUTO: NEGATIVE
BUN SERPL-MCNC: 41 MG/DL (ref 7–20)
CALCIUM SERPL-MCNC: 9 MG/DL (ref 8.3–10.6)
CHLORIDE SERPL-SCNC: 100 MMOL/L (ref 99–110)
CLARITY UR: CLEAR
CO2 BLDV-SCNC: 22 MMOL/L
CO2 SERPL-SCNC: 19 MMOL/L (ref 21–32)
COHGB MFR BLDV: 1.9 % (ref 0–1.5)
COLOR UR: NORMAL
CREAT SERPL-MCNC: 1.4 MG/DL (ref 0.6–1.2)
DEPRECATED RDW RBC AUTO: 14.3 % (ref 12.4–15.4)
ECHO BSA: 1.54 M2
ECHO PERICARDIUM ANTERIOR DIMENSION: 1.2 CM
ECHO PERICARDIUM POSTERIOR DIMENSION: 2.4 CM
EKG ATRIAL RATE: 87 BPM
EKG DIAGNOSIS: NORMAL
EKG P AXIS: 32 DEGREES
EKG P-R INTERVAL: 170 MS
EKG Q-T INTERVAL: 380 MS
EKG QRS DURATION: 132 MS
EKG QTC CALCULATION (BAZETT): 457 MS
EKG R AXIS: -44 DEGREES
EKG T AXIS: 89 DEGREES
EKG VENTRICULAR RATE: 87 BPM
EOSINOPHIL # BLD: 0.1 K/UL (ref 0–0.6)
EOSINOPHIL NFR BLD: 1 %
FLUAV RNA RESP QL NAA+PROBE: NOT DETECTED
FLUBV RNA RESP QL NAA+PROBE: NOT DETECTED
GFR SERPLBLD CREATININE-BSD FMLA CKD-EPI: 34 ML/MIN/{1.73_M2}
GLUCOSE SERPL-MCNC: 113 MG/DL (ref 70–99)
GLUCOSE UR STRIP.AUTO-MCNC: NEGATIVE MG/DL
HCO3 BLDV-SCNC: 20.7 MMOL/L (ref 23–29)
HCT VFR BLD AUTO: 35 % (ref 36–48)
HGB BLD-MCNC: 11.8 G/DL (ref 12–16)
HGB UR QL STRIP.AUTO: NEGATIVE
INR PPP: 1.01 (ref 0.85–1.15)
KETONES UR STRIP.AUTO-MCNC: NEGATIVE MG/DL
LEUKOCYTE ESTERASE UR QL STRIP.AUTO: NEGATIVE
LYMPHOCYTES # BLD: 0.8 K/UL (ref 1–5.1)
LYMPHOCYTES NFR BLD: 14 %
MCH RBC QN AUTO: 31.3 PG (ref 26–34)
MCHC RBC AUTO-ENTMCNC: 33.6 G/DL (ref 31–36)
MCV RBC AUTO: 93.2 FL (ref 80–100)
METHGB MFR BLDV: 0.3 %
MONOCYTES # BLD: 0.5 K/UL (ref 0–1.3)
MONOCYTES NFR BLD: 8.9 %
NEUTROPHILS # BLD: 4.2 K/UL (ref 1.7–7.7)
NEUTROPHILS NFR BLD: 75.1 %
NITRITE UR QL STRIP.AUTO: NEGATIVE
NT-PROBNP SERPL-MCNC: 956 PG/ML (ref 0–449)
O2 THERAPY: ABNORMAL
PCO2 BLDV: 35.4 MMHG (ref 40–50)
PH BLDV: 7.38 [PH] (ref 7.35–7.45)
PH UR STRIP.AUTO: 6.5 [PH] (ref 5–8)
PLATELET # BLD AUTO: 169 K/UL (ref 135–450)
PMV BLD AUTO: 8.9 FL (ref 5–10.5)
PO2 BLDV: 63.2 MMHG (ref 25–40)
POTASSIUM SERPL-SCNC: 4.8 MMOL/L (ref 3.5–5.1)
PROT SERPL-MCNC: 7.4 G/DL (ref 6.4–8.2)
PROT UR STRIP.AUTO-MCNC: NEGATIVE MG/DL
PROTHROMBIN TIME: 13.5 SEC (ref 11.9–14.9)
RBC # BLD AUTO: 3.76 M/UL (ref 4–5.2)
SAO2 % BLDV: 92 %
SARS-COV-2 RNA RESP QL NAA+PROBE: NOT DETECTED
SODIUM SERPL-SCNC: 132 MMOL/L (ref 136–145)
SP GR UR STRIP.AUTO: 1.01 (ref 1–1.03)
TROPONIN, HIGH SENSITIVITY: 25 NG/L (ref 0–14)
TROPONIN, HIGH SENSITIVITY: 26 NG/L (ref 0–14)
TROPONIN, HIGH SENSITIVITY: 35 NG/L (ref 0–14)
UA COMPLETE W REFLEX CULTURE PNL UR: NORMAL
UA DIPSTICK W REFLEX MICRO PNL UR: NORMAL
URN SPEC COLLECT METH UR: NORMAL
UROBILINOGEN UR STRIP-ACNC: 0.2 E.U./DL
WBC # BLD AUTO: 5.6 K/UL (ref 4–11)

## 2024-07-09 PROCEDURE — 81003 URINALYSIS AUTO W/O SCOPE: CPT

## 2024-07-09 PROCEDURE — 80061 LIPID PANEL: CPT

## 2024-07-09 PROCEDURE — 83880 ASSAY OF NATRIURETIC PEPTIDE: CPT

## 2024-07-09 PROCEDURE — 2580000003 HC RX 258: Performed by: STUDENT IN AN ORGANIZED HEALTH CARE EDUCATION/TRAINING PROGRAM

## 2024-07-09 PROCEDURE — 83036 HEMOGLOBIN GLYCOSYLATED A1C: CPT

## 2024-07-09 PROCEDURE — 36415 COLL VENOUS BLD VENIPUNCTURE: CPT

## 2024-07-09 PROCEDURE — 93308 TTE F-UP OR LMTD: CPT | Performed by: INTERNAL MEDICINE

## 2024-07-09 PROCEDURE — 71046 X-RAY EXAM CHEST 2 VIEWS: CPT

## 2024-07-09 PROCEDURE — 99223 1ST HOSP IP/OBS HIGH 75: CPT | Performed by: INTERNAL MEDICINE

## 2024-07-09 PROCEDURE — 80053 COMPREHEN METABOLIC PANEL: CPT

## 2024-07-09 PROCEDURE — 85025 COMPLETE CBC W/AUTO DIFF WBC: CPT

## 2024-07-09 PROCEDURE — 96374 THER/PROPH/DIAG INJ IV PUSH: CPT

## 2024-07-09 PROCEDURE — 93010 ELECTROCARDIOGRAM REPORT: CPT | Performed by: INTERNAL MEDICINE

## 2024-07-09 PROCEDURE — 93325 DOPPLER ECHO COLOR FLOW MAPG: CPT | Performed by: INTERNAL MEDICINE

## 2024-07-09 PROCEDURE — 2580000003 HC RX 258: Performed by: EMERGENCY MEDICINE

## 2024-07-09 PROCEDURE — 99285 EMERGENCY DEPT VISIT HI MDM: CPT

## 2024-07-09 PROCEDURE — 84443 ASSAY THYROID STIM HORMONE: CPT

## 2024-07-09 PROCEDURE — 93308 TTE F-UP OR LMTD: CPT

## 2024-07-09 PROCEDURE — 93321 DOPPLER ECHO F-UP/LMTD STD: CPT | Performed by: INTERNAL MEDICINE

## 2024-07-09 PROCEDURE — 85610 PROTHROMBIN TIME: CPT

## 2024-07-09 PROCEDURE — 87636 SARSCOV2 & INF A&B AMP PRB: CPT

## 2024-07-09 PROCEDURE — 82803 BLOOD GASES ANY COMBINATION: CPT

## 2024-07-09 PROCEDURE — 6360000002 HC RX W HCPCS: Performed by: EMERGENCY MEDICINE

## 2024-07-09 PROCEDURE — 84484 ASSAY OF TROPONIN QUANT: CPT

## 2024-07-09 PROCEDURE — 93005 ELECTROCARDIOGRAM TRACING: CPT | Performed by: EMERGENCY MEDICINE

## 2024-07-09 PROCEDURE — 2000000000 HC ICU R&B

## 2024-07-09 PROCEDURE — 6370000000 HC RX 637 (ALT 250 FOR IP): Performed by: STUDENT IN AN ORGANIZED HEALTH CARE EDUCATION/TRAINING PROGRAM

## 2024-07-09 RX ORDER — ACETAMINOPHEN 325 MG/1
650 TABLET ORAL EVERY 6 HOURS PRN
Status: DISCONTINUED | OUTPATIENT
Start: 2024-07-09 | End: 2024-07-14 | Stop reason: HOSPADM

## 2024-07-09 RX ORDER — SODIUM CHLORIDE, SODIUM LACTATE, POTASSIUM CHLORIDE, AND CALCIUM CHLORIDE .6; .31; .03; .02 G/100ML; G/100ML; G/100ML; G/100ML
500 INJECTION, SOLUTION INTRAVENOUS ONCE
Status: COMPLETED | OUTPATIENT
Start: 2024-07-09 | End: 2024-07-09

## 2024-07-09 RX ORDER — SODIUM CHLORIDE 9 MG/ML
INJECTION, SOLUTION INTRAVENOUS PRN
Status: DISCONTINUED | OUTPATIENT
Start: 2024-07-09 | End: 2024-07-11

## 2024-07-09 RX ORDER — ONDANSETRON 2 MG/ML
4 INJECTION INTRAMUSCULAR; INTRAVENOUS EVERY 6 HOURS PRN
Status: DISCONTINUED | OUTPATIENT
Start: 2024-07-09 | End: 2024-07-14 | Stop reason: HOSPADM

## 2024-07-09 RX ORDER — POLYETHYLENE GLYCOL 3350 17 G/17G
17 POWDER, FOR SOLUTION ORAL DAILY PRN
Status: DISCONTINUED | OUTPATIENT
Start: 2024-07-09 | End: 2024-07-14 | Stop reason: HOSPADM

## 2024-07-09 RX ORDER — SODIUM CHLORIDE 0.9 % (FLUSH) 0.9 %
5-40 SYRINGE (ML) INJECTION EVERY 12 HOURS SCHEDULED
Status: DISCONTINUED | OUTPATIENT
Start: 2024-07-09 | End: 2024-07-11

## 2024-07-09 RX ORDER — ASPIRIN 81 MG/1
81 TABLET ORAL DAILY
Status: DISCONTINUED | OUTPATIENT
Start: 2024-07-09 | End: 2024-07-14 | Stop reason: HOSPADM

## 2024-07-09 RX ORDER — ATROPINE SULFATE 0.1 MG/ML
0.5 INJECTION INTRAVENOUS ONCE
Status: COMPLETED | OUTPATIENT
Start: 2024-07-09 | End: 2024-07-09

## 2024-07-09 RX ORDER — ACETAMINOPHEN 650 MG/1
650 SUPPOSITORY RECTAL EVERY 6 HOURS PRN
Status: DISCONTINUED | OUTPATIENT
Start: 2024-07-09 | End: 2024-07-14 | Stop reason: HOSPADM

## 2024-07-09 RX ORDER — ATORVASTATIN CALCIUM 10 MG/1
20 TABLET, FILM COATED ORAL DAILY
Status: DISCONTINUED | OUTPATIENT
Start: 2024-07-09 | End: 2024-07-14 | Stop reason: HOSPADM

## 2024-07-09 RX ORDER — AMLODIPINE BESYLATE 5 MG/1
10 TABLET ORAL DAILY
Status: DISCONTINUED | OUTPATIENT
Start: 2024-07-09 | End: 2024-07-14 | Stop reason: HOSPADM

## 2024-07-09 RX ORDER — LEVOTHYROXINE SODIUM 0.12 MG/1
125 TABLET ORAL
Status: DISCONTINUED | OUTPATIENT
Start: 2024-07-10 | End: 2024-07-14 | Stop reason: HOSPADM

## 2024-07-09 RX ORDER — SODIUM CHLORIDE 0.9 % (FLUSH) 0.9 %
5-40 SYRINGE (ML) INJECTION PRN
Status: DISCONTINUED | OUTPATIENT
Start: 2024-07-09 | End: 2024-07-11

## 2024-07-09 RX ORDER — ONDANSETRON 4 MG/1
4 TABLET, ORALLY DISINTEGRATING ORAL EVERY 8 HOURS PRN
Status: DISCONTINUED | OUTPATIENT
Start: 2024-07-09 | End: 2024-07-14 | Stop reason: HOSPADM

## 2024-07-09 RX ADMIN — Medication 10 ML: at 19:54

## 2024-07-09 RX ADMIN — ATORVASTATIN CALCIUM 20 MG: 10 TABLET, FILM COATED ORAL at 19:53

## 2024-07-09 RX ADMIN — SODIUM CHLORIDE, POTASSIUM CHLORIDE, SODIUM LACTATE AND CALCIUM CHLORIDE 500 ML: 600; 310; 30; 20 INJECTION, SOLUTION INTRAVENOUS at 14:29

## 2024-07-09 RX ADMIN — ASPIRIN 81 MG: 81 TABLET, COATED ORAL at 19:53

## 2024-07-09 RX ADMIN — AMLODIPINE BESYLATE 10 MG: 5 TABLET ORAL at 19:53

## 2024-07-09 RX ADMIN — ATROPINE SULFATE 0.5 MG: 0.1 INJECTION INTRAVENOUS at 14:40

## 2024-07-09 RX ADMIN — ATROPINE SULFATE 0.5 MG: 0.1 INJECTION INTRAVENOUS at 14:42

## 2024-07-09 ASSESSMENT — LIFESTYLE VARIABLES: HOW OFTEN DO YOU HAVE A DRINK CONTAINING ALCOHOL: NEVER

## 2024-07-09 ASSESSMENT — PAIN - FUNCTIONAL ASSESSMENT
PAIN_FUNCTIONAL_ASSESSMENT: NONE - DENIES PAIN

## 2024-07-09 ASSESSMENT — PAIN SCALES - GENERAL: PAINLEVEL_OUTOF10: 0

## 2024-07-09 NOTE — ED NOTES
@1041 called cardiology per Ivana Padilla, DO  RE:  ELCTROPHYS --- HR at 22, bradycardia  Dr. Padilla went to the EP cath lab and spoke with Dr. Hu

## 2024-07-09 NOTE — ED NOTES
At patient bedside obtaining a nasal swab when patient was noted to have a decreased heart rate - bradycardia with initial rate in the low 40s, then dropping to 27.  EKG obtained.  Patient moved to room 3 for monitoring.  Defibrillator pads applied.  Additional IV access obtained.  Dr Padilla to bedside and verbal order for Atropine received.  Initial 05mg showed now improvement in heart rate.  Second dose of 0.5mg Atropine showed an improved heart rate to 50.  Patient remains sinus bradycardia on monitor.  Patient's daughter called and updated on patient status and plan of care.  Spoke with Dr Padilla on phone regarding potential pacemaker placement and admission to hospital.

## 2024-07-09 NOTE — ED PROVIDER NOTES
Emergency Department Attending Physician Note  Location: [facility]  ED  7/9/2024       Pt Name: Sheyla Byrd  MRN: [id number]  Birthdate 4/21/1926    Date of evaluation: 7/9/2024  Provider: ESTELA PALOMINO DO  PCP: Cornelius Gerardo MD    Note Started: 1:35 PM EDT 7/9/24    CHIEF COMPLAINT  Chief Complaint   Patient presents with    Bradycardia     EMS called for general weakness - on arrival for them heart rate was 22, gradually improved en route.         ROOM: 10    HISTORY OF PRESENT ILLNESS:  History obtained by patient and EMS. Limitations to history : None and patient [place] .     Sheyla Byrd is a 89 y.o. female with a significant PMHx of left bundle branch block back to 2012, looks like recent large pericardial effusion and [facility] as below, history of kidney cancer in the distant past, hyperlipidemia, hypertension, and other comorbidities as listed below, presenting for department today with concerns of generalized weakness and fatigue.  EMS reports that on scene her heart rate was 22 and her systolic blood pressure was 80.  Patient says that about a week ago she was diagnosed with a sinus infection, given amoxicillin, but never really felt any better.  Then this morning she had \"such bad diarrhea,\" that she took Pepto-Bismol.  She had at least 2-3 episodes of diarrhea.  After that, she felt like she was very weak, so she called 911.  She never had any chest pain, vomiting, back pain, lightheadedness, dizziness, numbness, weakness, or headaches.  Says \"I just felt so bad.\"  On arrival to emergency department, heart rate is in the 80s persistently.  Blood pressure is elevated 162/67 with little bit of a wide pulse pressure.  She says she did take Pepto-Bismol, although her stool is not black.  No other concerns including any recent falls except for about 3 weeks ago.  Lives alone at home.    On chart review as below, looks like she has a chronic pericardial effusion,  What Type Of Note Output Would You Prefer (Optional)?: Bullet Format Is The Patient Presenting As Previously Scheduled?: Yes How Severe Is Your Rash?: moderate Is This A New Presentation, Or A Follow-Up?: Rash

## 2024-07-09 NOTE — H&P
Output --   Net 500 ml      Vitals:   Vitals:    07/09/24 1325 07/09/24 1431 07/09/24 1439 07/09/24 1454   BP: (!) 162/67  109/85 (!) 156/54   Pulse: 86 (!) 44 (!) 27 50   Resp: 14 13 18 16   Temp: 98.1 °F (36.7 °C)      TempSrc: Oral      SpO2: 95% 94% 94% 94%   Weight: 56.2 kg (124 lb)      Height: 1.524 m (5')          Medications Prior to Admission     Prior to Admission medications    Medication Sig Start Date End Date Taking? Authorizing Provider   ondansetron (ZOFRAN ODT) 4 MG disintegrating tablet Take 1 tablet by mouth every 8 hours as needed for Nausea 8/17/22   Melissa Wolfe MD   ondansetron (ZOFRAN ODT) 4 MG disintegrating tablet Take 1 tablet by mouth every 8 hours as needed for Nausea or Vomiting 7/13/21   Trip Calderon PA-C   atenolol (TENORMIN) 25 MG tablet Take 1 tablet by mouth daily. 8/7/14   Jenni Kimball MD   amLODIPine (NORVASC) 10 MG tablet Take 10 mg by mouth daily.    Nancy Timmons MD   simvastatin (ZOCOR) 20 MG tablet Take 20 mg by mouth nightly.    Nancy Timmons MD   NASAL SALINE NA by Nasal route as needed     Nancy Timmons MD   meclizine (ANTIVERT) 25 MG tablet Take 25 mg by mouth 3 times daily as needed.    Nancy Timmons MD   fluticasone (FLONASE) 50 MCG/ACT nasal spray 1 spray by Nasal route as needed     Nancy Timmons MD   omeprazole (PRILOSEC) 20 MG capsule Take 20 mg by mouth daily.      Nancy Timmons MD   levothyroxine (SYNTHROID) 125 MCG tablet Take 125 mcg by mouth daily     Nancy Timmons MD   aspirin 81 MG EC tablet Take 81 mg by mouth daily.    Nancy Timmons MD       Physical Exam: Need 8 Elements   Physical Exam  Vitals reviewed.   Constitutional:       Appearance: Normal appearance. She is normal weight.   HENT:      Head: Normocephalic.      Nose: Nose normal.      Mouth/Throat:      Mouth: Mucous membranes are moist.   Eyes:      Conjunctiva/sclera: Conjunctivae normal.      Pupils: Pupils are

## 2024-07-09 NOTE — PLAN OF CARE
Problem: Discharge Planning  Goal: Discharge to home or other facility with appropriate resources  7/9/2024 1953 by Madison Frias, RN  Outcome: Progressing     Problem: ABCDS Injury Assessment  Goal: Absence of physical injury  7/9/2024 1953 by Madison Frias RN  Outcome: Progressing     Problem: Skin/Tissue Integrity  Goal: Absence of new skin breakdown  Description: 1.  Monitor for areas of redness and/or skin breakdown  2.  Assess vascular access sites hourly  3.  Every 4-6 hours minimum:  Change oxygen saturation probe site  4.  Every 4-6 hours:  If on nasal continuous positive airway pressure, respiratory therapy assess nares and determine need for appliance change or resting period.  7/9/2024 1953 by Madison Frias, RN  Outcome: Progressing     Problem: Safety - Adult  Goal: Free from fall injury  7/9/2024 1953 by Madison Frias, RN  Outcome: Progressing

## 2024-07-10 ENCOUNTER — ANESTHESIA (OUTPATIENT)
Dept: CARDIAC CATH/INVASIVE PROCEDURES | Age: 89
End: 2024-07-10
Payer: MEDICARE

## 2024-07-10 ENCOUNTER — APPOINTMENT (OUTPATIENT)
Dept: CT IMAGING | Age: 89
DRG: 242 | End: 2024-07-10
Payer: MEDICARE

## 2024-07-10 ENCOUNTER — NURSE ONLY (OUTPATIENT)
Dept: CARDIOLOGY CLINIC | Age: 89
End: 2024-07-10

## 2024-07-10 ENCOUNTER — ANESTHESIA EVENT (OUTPATIENT)
Dept: CARDIAC CATH/INVASIVE PROCEDURES | Age: 89
End: 2024-07-10
Payer: MEDICARE

## 2024-07-10 DIAGNOSIS — Z95.0 PACEMAKER: Primary | ICD-10-CM

## 2024-07-10 LAB
ANION GAP SERPL CALCULATED.3IONS-SCNC: 10 MMOL/L (ref 3–16)
BUN SERPL-MCNC: 32 MG/DL (ref 7–20)
CALCIUM SERPL-MCNC: 9.2 MG/DL (ref 8.3–10.6)
CHLORIDE SERPL-SCNC: 101 MMOL/L (ref 99–110)
CHOLEST SERPL-MCNC: 157 MG/DL (ref 0–199)
CO2 SERPL-SCNC: 24 MMOL/L (ref 21–32)
CREAT SERPL-MCNC: 1.1 MG/DL (ref 0.6–1.2)
DEPRECATED RDW RBC AUTO: 14.3 % (ref 12.4–15.4)
ECHO BSA: 1.56 M2
EKG ATRIAL RATE: 22 BPM
EKG ATRIAL RATE: 67 BPM
EKG DIAGNOSIS: NORMAL
EKG DIAGNOSIS: NORMAL
EKG P AXIS: 44 DEGREES
EKG P-R INTERVAL: 140 MS
EKG Q-T INTERVAL: 422 MS
EKG Q-T INTERVAL: 526 MS
EKG QRS DURATION: 114 MS
EKG QRS DURATION: 98 MS
EKG QTC CALCULATION (BAZETT): 332 MS
EKG QTC CALCULATION (BAZETT): 445 MS
EKG R AXIS: 52 DEGREES
EKG R AXIS: 92 DEGREES
EKG T AXIS: 246 DEGREES
EKG T AXIS: 89 DEGREES
EKG VENTRICULAR RATE: 24 BPM
EKG VENTRICULAR RATE: 67 BPM
EST. AVERAGE GLUCOSE BLD GHB EST-MCNC: 125.5 MG/DL
GFR SERPLBLD CREATININE-BSD FMLA CKD-EPI: 45 ML/MIN/{1.73_M2}
GLUCOSE SERPL-MCNC: 114 MG/DL (ref 70–99)
HBA1C MFR BLD: 6 %
HCT VFR BLD AUTO: 36.1 % (ref 36–48)
HDLC SERPL-MCNC: 55 MG/DL (ref 40–60)
HGB BLD-MCNC: 12.2 G/DL (ref 12–16)
LDLC SERPL CALC-MCNC: 79 MG/DL
MAGNESIUM SERPL-MCNC: 2 MG/DL (ref 1.8–2.4)
MCH RBC QN AUTO: 31.3 PG (ref 26–34)
MCHC RBC AUTO-ENTMCNC: 33.7 G/DL (ref 31–36)
MCV RBC AUTO: 92.8 FL (ref 80–100)
PHOSPHATE SERPL-MCNC: 3.9 MG/DL (ref 2.5–4.9)
PLATELET # BLD AUTO: 188 K/UL (ref 135–450)
PMV BLD AUTO: 8.3 FL (ref 5–10.5)
POTASSIUM SERPL-SCNC: 4 MMOL/L (ref 3.5–5.1)
RBC # BLD AUTO: 3.89 M/UL (ref 4–5.2)
SODIUM SERPL-SCNC: 135 MMOL/L (ref 136–145)
TRIGL SERPL-MCNC: 113 MG/DL (ref 0–150)
TROPONIN, HIGH SENSITIVITY: 39 NG/L (ref 0–14)
TROPONIN, HIGH SENSITIVITY: 42 NG/L (ref 0–14)
TROPONIN, HIGH SENSITIVITY: 48 NG/L (ref 0–14)
TROPONIN, HIGH SENSITIVITY: 57 NG/L (ref 0–14)
TSH SERPL DL<=0.005 MIU/L-ACNC: 0.79 UIU/ML (ref 0.27–4.2)
VLDLC SERPL CALC-MCNC: 23 MG/DL
WBC # BLD AUTO: 6.8 K/UL (ref 4–11)

## 2024-07-10 PROCEDURE — 93005 ELECTROCARDIOGRAM TRACING: CPT | Performed by: INTERNAL MEDICINE

## 2024-07-10 PROCEDURE — C1769 GUIDE WIRE: HCPCS | Performed by: INTERNAL MEDICINE

## 2024-07-10 PROCEDURE — 36415 COLL VENOUS BLD VENIPUNCTURE: CPT

## 2024-07-10 PROCEDURE — 94761 N-INVAS EAR/PLS OXIMETRY MLT: CPT

## 2024-07-10 PROCEDURE — 97166 OT EVAL MOD COMPLEX 45 MIN: CPT

## 2024-07-10 PROCEDURE — 6370000000 HC RX 637 (ALT 250 FOR IP): Performed by: INTERNAL MEDICINE

## 2024-07-10 PROCEDURE — C1785 PMKR, DUAL, RATE-RESP: HCPCS | Performed by: INTERNAL MEDICINE

## 2024-07-10 PROCEDURE — 84100 ASSAY OF PHOSPHORUS: CPT

## 2024-07-10 PROCEDURE — 80048 BASIC METABOLIC PNL TOTAL CA: CPT

## 2024-07-10 PROCEDURE — 97530 THERAPEUTIC ACTIVITIES: CPT

## 2024-07-10 PROCEDURE — C1898 LEAD, PMKR, OTHER THAN TRANS: HCPCS | Performed by: INTERNAL MEDICINE

## 2024-07-10 PROCEDURE — 02HK3JZ INSERTION OF PACEMAKER LEAD INTO RIGHT VENTRICLE, PERCUTANEOUS APPROACH: ICD-10-PCS | Performed by: INTERNAL MEDICINE

## 2024-07-10 PROCEDURE — C1887 CATHETER, GUIDING: HCPCS | Performed by: INTERNAL MEDICINE

## 2024-07-10 PROCEDURE — 02H63JZ INSERTION OF PACEMAKER LEAD INTO RIGHT ATRIUM, PERCUTANEOUS APPROACH: ICD-10-PCS | Performed by: INTERNAL MEDICINE

## 2024-07-10 PROCEDURE — 6360000002 HC RX W HCPCS: Performed by: INTERNAL MEDICINE

## 2024-07-10 PROCEDURE — 0JH606Z INSERTION OF PACEMAKER, DUAL CHAMBER INTO CHEST SUBCUTANEOUS TISSUE AND FASCIA, OPEN APPROACH: ICD-10-PCS | Performed by: INTERNAL MEDICINE

## 2024-07-10 PROCEDURE — 2580000003 HC RX 258: Performed by: INTERNAL MEDICINE

## 2024-07-10 PROCEDURE — C1892 INTRO/SHEATH,FIXED,PEEL-AWAY: HCPCS | Performed by: INTERNAL MEDICINE

## 2024-07-10 PROCEDURE — 2500000003 HC RX 250 WO HCPCS: Performed by: NURSE ANESTHETIST, CERTIFIED REGISTERED

## 2024-07-10 PROCEDURE — 33206 INSERT HEART PM ATRIAL: CPT | Performed by: INTERNAL MEDICINE

## 2024-07-10 PROCEDURE — 2500000003 HC RX 250 WO HCPCS: Performed by: INTERNAL MEDICINE

## 2024-07-10 PROCEDURE — 6360000002 HC RX W HCPCS: Performed by: NURSE PRACTITIONER

## 2024-07-10 PROCEDURE — 2700000000 HC OXYGEN THERAPY PER DAY

## 2024-07-10 PROCEDURE — 93010 ELECTROCARDIOGRAM REPORT: CPT | Performed by: INTERNAL MEDICINE

## 2024-07-10 PROCEDURE — L3660 SO 8 AB RSTR CAN/WEB PRE OTS: HCPCS | Performed by: INTERNAL MEDICINE

## 2024-07-10 PROCEDURE — 6360000002 HC RX W HCPCS: Performed by: NURSE ANESTHETIST, CERTIFIED REGISTERED

## 2024-07-10 PROCEDURE — 3700000001 HC ADD 15 MINUTES (ANESTHESIA): Performed by: INTERNAL MEDICINE

## 2024-07-10 PROCEDURE — 83735 ASSAY OF MAGNESIUM: CPT

## 2024-07-10 PROCEDURE — 70450 CT HEAD/BRAIN W/O DYE: CPT

## 2024-07-10 PROCEDURE — 6370000000 HC RX 637 (ALT 250 FOR IP): Performed by: STUDENT IN AN ORGANIZED HEALTH CARE EDUCATION/TRAINING PROGRAM

## 2024-07-10 PROCEDURE — 85027 COMPLETE CBC AUTOMATED: CPT

## 2024-07-10 PROCEDURE — 3700000000 HC ANESTHESIA ATTENDED CARE: Performed by: INTERNAL MEDICINE

## 2024-07-10 PROCEDURE — 84484 ASSAY OF TROPONIN QUANT: CPT

## 2024-07-10 PROCEDURE — 2000000000 HC ICU R&B

## 2024-07-10 PROCEDURE — 97162 PT EVAL MOD COMPLEX 30 MIN: CPT

## 2024-07-10 PROCEDURE — 2580000003 HC RX 258: Performed by: NURSE ANESTHETIST, CERTIFIED REGISTERED

## 2024-07-10 PROCEDURE — 6360000002 HC RX W HCPCS: Performed by: STUDENT IN AN ORGANIZED HEALTH CARE EDUCATION/TRAINING PROGRAM

## 2024-07-10 PROCEDURE — 2709999900 HC NON-CHARGEABLE SUPPLY: Performed by: INTERNAL MEDICINE

## 2024-07-10 DEVICE — IPG W1DR01 AZURE XT DR MRI USA
Type: IMPLANTABLE DEVICE | Status: FUNCTIONAL
Brand: AZURE™ XT DR MRI SURESCAN™

## 2024-07-10 DEVICE — LEAD 5076-52 MRI US RCMCRD
Type: IMPLANTABLE DEVICE | Status: FUNCTIONAL
Brand: CAPSUREFIX NOVUS MRI™ SURESCAN®

## 2024-07-10 DEVICE — LEAD 3830 US MKT/ 69CM MRI LBBAP
Type: IMPLANTABLE DEVICE | Status: FUNCTIONAL
Brand: SELECTSECURE™ MRI SURESCAN™

## 2024-07-10 RX ORDER — LABETALOL HYDROCHLORIDE 5 MG/ML
10 INJECTION, SOLUTION INTRAVENOUS
Status: DISCONTINUED | OUTPATIENT
Start: 2024-07-10 | End: 2024-07-11

## 2024-07-10 RX ORDER — LOPERAMIDE HYDROCHLORIDE 2 MG/1
2 CAPSULE ORAL 4 TIMES DAILY PRN
Status: DISCONTINUED | OUTPATIENT
Start: 2024-07-10 | End: 2024-07-14 | Stop reason: HOSPADM

## 2024-07-10 RX ORDER — PROPOFOL 10 MG/ML
INJECTION, EMULSION INTRAVENOUS CONTINUOUS PRN
Status: DISCONTINUED | OUTPATIENT
Start: 2024-07-10 | End: 2024-07-10 | Stop reason: SDUPTHER

## 2024-07-10 RX ORDER — SODIUM CHLORIDE 0.9 % (FLUSH) 0.9 %
5-40 SYRINGE (ML) INJECTION EVERY 12 HOURS SCHEDULED
Status: DISCONTINUED | OUTPATIENT
Start: 2024-07-10 | End: 2024-07-14 | Stop reason: HOSPADM

## 2024-07-10 RX ORDER — SODIUM CHLORIDE 9 MG/ML
INJECTION, SOLUTION INTRAVENOUS PRN
Status: DISCONTINUED | OUTPATIENT
Start: 2024-07-10 | End: 2024-07-14 | Stop reason: HOSPADM

## 2024-07-10 RX ORDER — NALOXONE HYDROCHLORIDE 0.4 MG/ML
INJECTION, SOLUTION INTRAMUSCULAR; INTRAVENOUS; SUBCUTANEOUS PRN
Status: DISCONTINUED | OUTPATIENT
Start: 2024-07-10 | End: 2024-07-11

## 2024-07-10 RX ORDER — BUPIVACAINE HYDROCHLORIDE 5 MG/ML
INJECTION, SOLUTION EPIDURAL; INTRACAUDAL PRN
Status: DISCONTINUED | OUTPATIENT
Start: 2024-07-10 | End: 2024-07-10 | Stop reason: HOSPADM

## 2024-07-10 RX ORDER — SODIUM CHLORIDE 9 MG/ML
INJECTION, SOLUTION INTRAVENOUS PRN
Status: DISCONTINUED | OUTPATIENT
Start: 2024-07-10 | End: 2024-07-11

## 2024-07-10 RX ORDER — ZIPRASIDONE MESYLATE 20 MG/ML
10 INJECTION, POWDER, LYOPHILIZED, FOR SOLUTION INTRAMUSCULAR ONCE
Status: COMPLETED | OUTPATIENT
Start: 2024-07-10 | End: 2024-07-10

## 2024-07-10 RX ORDER — LIDOCAINE HYDROCHLORIDE 20 MG/ML
INJECTION, SOLUTION EPIDURAL; INFILTRATION; INTRACAUDAL; PERINEURAL PRN
Status: DISCONTINUED | OUTPATIENT
Start: 2024-07-10 | End: 2024-07-10 | Stop reason: HOSPADM

## 2024-07-10 RX ORDER — SODIUM CHLORIDE 0.9 % (FLUSH) 0.9 %
5-40 SYRINGE (ML) INJECTION EVERY 12 HOURS SCHEDULED
Status: DISCONTINUED | OUTPATIENT
Start: 2024-07-10 | End: 2024-07-11

## 2024-07-10 RX ORDER — LIDOCAINE HYDROCHLORIDE 20 MG/ML
INJECTION, SOLUTION INFILTRATION; PERINEURAL PRN
Status: DISCONTINUED | OUTPATIENT
Start: 2024-07-10 | End: 2024-07-10 | Stop reason: SDUPTHER

## 2024-07-10 RX ORDER — SODIUM CHLORIDE 9 MG/ML
INJECTION, SOLUTION INTRAVENOUS CONTINUOUS PRN
Status: DISCONTINUED | OUTPATIENT
Start: 2024-07-10 | End: 2024-07-10 | Stop reason: SDUPTHER

## 2024-07-10 RX ORDER — OLANZAPINE 10 MG/2ML
10 INJECTION, POWDER, FOR SOLUTION INTRAMUSCULAR ONCE
Status: COMPLETED | OUTPATIENT
Start: 2024-07-10 | End: 2024-07-10

## 2024-07-10 RX ORDER — SODIUM CHLORIDE 0.9 % (FLUSH) 0.9 %
5-40 SYRINGE (ML) INJECTION PRN
Status: DISCONTINUED | OUTPATIENT
Start: 2024-07-10 | End: 2024-07-14 | Stop reason: HOSPADM

## 2024-07-10 RX ORDER — SODIUM CHLORIDE 0.9 % (FLUSH) 0.9 %
5-40 SYRINGE (ML) INJECTION PRN
Status: DISCONTINUED | OUTPATIENT
Start: 2024-07-10 | End: 2024-07-11

## 2024-07-10 RX ORDER — PROPOFOL 10 MG/ML
INJECTION, EMULSION INTRAVENOUS PRN
Status: DISCONTINUED | OUTPATIENT
Start: 2024-07-10 | End: 2024-07-10 | Stop reason: SDUPTHER

## 2024-07-10 RX ORDER — ONDANSETRON 2 MG/ML
4 INJECTION INTRAMUSCULAR; INTRAVENOUS EVERY 30 MIN PRN
Status: DISCONTINUED | OUTPATIENT
Start: 2024-07-10 | End: 2024-07-11

## 2024-07-10 RX ORDER — VANCOMYCIN HYDROCHLORIDE 1 G/20ML
INJECTION, POWDER, LYOPHILIZED, FOR SOLUTION INTRAVENOUS PRN
Status: DISCONTINUED | OUTPATIENT
Start: 2024-07-10 | End: 2024-07-10 | Stop reason: SDUPTHER

## 2024-07-10 RX ADMIN — LEVOTHYROXINE SODIUM 125 MCG: 0.12 TABLET ORAL at 06:27

## 2024-07-10 RX ADMIN — MUPIROCIN: 20 OINTMENT TOPICAL at 12:47

## 2024-07-10 RX ADMIN — ONDANSETRON 4 MG: 2 INJECTION INTRAMUSCULAR; INTRAVENOUS at 02:15

## 2024-07-10 RX ADMIN — PROPOFOL 20 MG: 10 INJECTION, EMULSION INTRAVENOUS at 11:59

## 2024-07-10 RX ADMIN — SODIUM CHLORIDE: 9 INJECTION, SOLUTION INTRAVENOUS at 10:20

## 2024-07-10 RX ADMIN — VANCOMYCIN HYDROCHLORIDE 1000 MG: 1 INJECTION, POWDER, LYOPHILIZED, FOR SOLUTION INTRAVENOUS at 10:25

## 2024-07-10 RX ADMIN — PROPOFOL 30 MCG/KG/MIN: 10 INJECTION, EMULSION INTRAVENOUS at 10:28

## 2024-07-10 RX ADMIN — PROPOFOL 20 MG: 10 INJECTION, EMULSION INTRAVENOUS at 10:35

## 2024-07-10 RX ADMIN — ZIPRASIDONE MESYLATE 10 MG: 20 INJECTION, POWDER, LYOPHILIZED, FOR SOLUTION INTRAMUSCULAR at 19:28

## 2024-07-10 RX ADMIN — Medication 10 ML: at 12:47

## 2024-07-10 RX ADMIN — PROPOFOL 40 MG: 10 INJECTION, EMULSION INTRAVENOUS at 10:53

## 2024-07-10 RX ADMIN — OLANZAPINE 10 MG: 10 INJECTION, POWDER, FOR SOLUTION INTRAMUSCULAR at 23:18

## 2024-07-10 RX ADMIN — LIDOCAINE HYDROCHLORIDE 50 MG: 20 INJECTION, SOLUTION INFILTRATION; PERINEURAL at 10:27

## 2024-07-10 RX ADMIN — ATORVASTATIN CALCIUM 20 MG: 10 TABLET, FILM COATED ORAL at 12:44

## 2024-07-10 RX ADMIN — AMLODIPINE BESYLATE 10 MG: 5 TABLET ORAL at 12:45

## 2024-07-10 RX ADMIN — PROPOFOL 20 MG: 10 INJECTION, EMULSION INTRAVENOUS at 11:09

## 2024-07-10 NOTE — PLAN OF CARE
Problem: Discharge Planning  Goal: Discharge to home or other facility with appropriate resources  Recent Flowsheet Documentation  Taken 7/9/2024 2000 by Maurice Rivas RN  Discharge to home or other facility with appropriate resources:   Identify barriers to discharge with patient and caregiver   Arrange for needed discharge resources and transportation as appropriate   Identify discharge learning needs (meds, wound care, etc)   Refer to discharge planning if patient needs post-hospital services based on physician order or complex needs related to functional status, cognitive ability or social support system  7/9/2024 1953 by Madison Frias RN  Outcome: Progressing  7/9/2024 1808 by Purnima Johnson RN  Outcome: Progressing  Flowsheets (Taken 7/9/2024 1750 by Madison Frias, RN)  Discharge to home or other facility with appropriate resources:   Identify barriers to discharge with patient and caregiver   Arrange for needed discharge resources and transportation as appropriate     Problem: Skin/Tissue Integrity  Goal: Absence of new skin breakdown  Description: 1.  Monitor for areas of redness and/or skin breakdown  2.  Assess vascular access sites hourly  3.  Every 4-6 hours minimum:  Change oxygen saturation probe site  4.  Every 4-6 hours:  If on nasal continuous positive airway pressure, respiratory therapy assess nares and determine need for appliance change or resting period.  7/9/2024 1953 by Madison Frias, RN  Outcome: Progressing  7/9/2024 1808 by Purnima Johnson RN  Outcome: Progressing     Problem: Safety - Adult  Goal: Free from fall injury  7/9/2024 1953 by Madison Frias, RN  Outcome: Progressing  7/9/2024 1808 by Purnima Johnson, RN  Outcome: Progressing

## 2024-07-10 NOTE — PROGRESS NOTES
PPM -[employer] Relay™    transient complete heart block, bradycardia    DEVICE MODEL  [id number] [place]™ XT DR MRI  DEVICE SERIAL NUMBER  [id number]  DEVICE TYPE  Pacemaker  DATE OF IMPLANT  10-Jul-2024  Monitor Information  MONITOR STATUS  Distributed to patient  DISTRIBUTION METHOD  Distributed from clinic inventory  DISTRIBUTION DATE  10-Jul-2024  MONITOR SERIAL NUMBER  [id number]  MONITOR MODEL  [id number]

## 2024-07-10 NOTE — ANESTHESIA PRE PROCEDURE
Department of Anesthesiology  Preprocedure Note       Name:  Sheyla Byrd   Age:  89 y.o.  :  1926                                          MRN:  [id number]         Date:  7/10/2024      Surgeon: Surgeon(s):  Sachin Hu MD    Procedure: Procedure(s):  Insert PPM dual    Medications prior to admission:   Prior to Admission medications    Medication Sig Start Date End Date Taking? Authorizing Provider   ondansetron (ZOFRAN ODT) 4 MG disintegrating tablet Take 1 tablet by mouth every 8 hours as needed for Nausea 22   Melissa Wolfe MD   ondansetron (ZOFRAN ODT) 4 MG disintegrating tablet Take 1 tablet by mouth every 8 hours as needed for Nausea or Vomiting 21   Trip Caledron PA-C   atenolol (TENORMIN) 25 MG tablet Take 1 tablet by mouth daily.  Patient taking differently: Take 2 tablets by mouth daily 14   Jenni Kimball MD   amLODIPine (NORVASC) 10 MG tablet Take 1 tablet by mouth daily    Nancy Timmons MD   simvastatin (ZOCOR) 20 MG tablet Take 1 tablet by mouth nightly    ProviderNancy MD   NASAL SALINE NA by Nasal route as needed     Nancy Timmons MD   meclizine (ANTIVERT) 25 MG tablet Take 1 tablet by mouth 3 times daily as needed    Nancy Timmons MD   fluticasone (FLONASE) 50 MCG/ACT nasal spray 1 spray by Nasal route as needed    Nancy Timmons MD   omeprazole (PRILOSEC) 20 MG capsule Take 1 capsule by mouth daily    Nancy Timmons MD   levothyroxine (SYNTHROID) 125 MCG tablet Take 1 tablet by mouth daily    Nancy Timmons MD   aspirin 81 MG EC tablet Take 1 tablet by mouth daily    Nancy Timmons MD       Current medications:    Current Facility-Administered Medications   Medication Dose Route Frequency Provider Last Rate Last Admin   • mupirocin (BACTROBAN) 2 % ointment   Each Nostril BID Chris Fong MD       • perflutren lipid microspheres (DEFINITY) injection 1.5 mL  1.5 mL IntraVENous

## 2024-07-10 NOTE — ACP (ADVANCE CARE PLANNING)
Advance Care Planning     Advance Care Planning Inpatient Note  [facility] Department    Today's Date: 7/10/2024  Unit: [facility]    Received request from IDT Member.  Upon review of chart and communication with care team, patient's decision making abilities are not in question.. Patient and Child/Children was/were present in the room during visit.    Goals of ACP Conversation:  Discuss advance care planning documents  Facilitate a discussion related to patient's goals of care as they align with the patient's values and beliefs.    Health Care Decision Makers:     Primary Decision Maker: Ruth Ann Cotto - Child - [phone]  Summary:  Completed New Documents  Updated Healthcare Decision Maker    Advance Care Planning Documents (Patient Wishes):  Healthcare Power of /Advance Directive Appointment of Health Care Agent  Living Will/Advance Directive     Assessment:  Pt not wanting to be kept on life support, including a feeding tube, if there is no hope of recovery. \"I don't want to be a vegetable.\"     Interventions:  Provided education on documents for clarity and greater understanding  Assisted in the completion of documents according to patient's wishes at this time    Care Preferences Communicated:   No    Outcomes/Plan:  ACP Discussion: Completed  New advance directive completed.  Returned original document(s) to patient, as well as copies for distribution to appointed agents  Copy of advance directive given to staff to scan into medical record.    Electronically signed by Chaplain Justo on 7/10/2024 at 9:52 AM

## 2024-07-11 ENCOUNTER — PROCEDURE VISIT (OUTPATIENT)
Dept: CARDIOLOGY CLINIC | Age: 89
End: 2024-07-11

## 2024-07-11 ENCOUNTER — APPOINTMENT (OUTPATIENT)
Dept: GENERAL RADIOLOGY | Age: 89
DRG: 242 | End: 2024-07-11
Payer: MEDICARE

## 2024-07-11 DIAGNOSIS — Z95.0 PACEMAKER: Primary | ICD-10-CM

## 2024-07-11 DIAGNOSIS — R00.1 BRADYCARDIA: ICD-10-CM

## 2024-07-11 LAB
ANION GAP SERPL CALCULATED.3IONS-SCNC: 11 MMOL/L (ref 3–16)
BUN SERPL-MCNC: 29 MG/DL (ref 7–20)
CALCIUM SERPL-MCNC: 9 MG/DL (ref 8.3–10.6)
CHLORIDE SERPL-SCNC: 104 MMOL/L (ref 99–110)
CO2 SERPL-SCNC: 22 MMOL/L (ref 21–32)
CREAT SERPL-MCNC: 1 MG/DL (ref 0.6–1.2)
DEPRECATED RDW RBC AUTO: 13.8 % (ref 12.4–15.4)
GFR SERPLBLD CREATININE-BSD FMLA CKD-EPI: 51 ML/MIN/{1.73_M2}
GLUCOSE SERPL-MCNC: 137 MG/DL (ref 70–99)
HCT VFR BLD AUTO: 34.6 % (ref 36–48)
HGB BLD-MCNC: 11.8 G/DL (ref 12–16)
MAGNESIUM SERPL-MCNC: 2.1 MG/DL (ref 1.8–2.4)
MCH RBC QN AUTO: 32.1 PG (ref 26–34)
MCHC RBC AUTO-ENTMCNC: 34.2 G/DL (ref 31–36)
MCV RBC AUTO: 93.8 FL (ref 80–100)
PHOSPHATE SERPL-MCNC: 3.9 MG/DL (ref 2.5–4.9)
PLATELET # BLD AUTO: 160 K/UL (ref 135–450)
PMV BLD AUTO: 8.6 FL (ref 5–10.5)
POTASSIUM SERPL-SCNC: 4.4 MMOL/L (ref 3.5–5.1)
RBC # BLD AUTO: 3.69 M/UL (ref 4–5.2)
SODIUM SERPL-SCNC: 137 MMOL/L (ref 136–145)
TROPONIN, HIGH SENSITIVITY: 50 NG/L (ref 0–14)
TROPONIN, HIGH SENSITIVITY: 51 NG/L (ref 0–14)
TROPONIN, HIGH SENSITIVITY: 58 NG/L (ref 0–14)
WBC # BLD AUTO: 9.6 K/UL (ref 4–11)

## 2024-07-11 PROCEDURE — 6370000000 HC RX 637 (ALT 250 FOR IP): Performed by: INTERNAL MEDICINE

## 2024-07-11 PROCEDURE — 97110 THERAPEUTIC EXERCISES: CPT

## 2024-07-11 PROCEDURE — 2580000003 HC RX 258: Performed by: INTERNAL MEDICINE

## 2024-07-11 PROCEDURE — 85027 COMPLETE CBC AUTOMATED: CPT

## 2024-07-11 PROCEDURE — 71045 X-RAY EXAM CHEST 1 VIEW: CPT

## 2024-07-11 PROCEDURE — 36415 COLL VENOUS BLD VENIPUNCTURE: CPT

## 2024-07-11 PROCEDURE — 84484 ASSAY OF TROPONIN QUANT: CPT

## 2024-07-11 PROCEDURE — 2700000000 HC OXYGEN THERAPY PER DAY

## 2024-07-11 PROCEDURE — 84100 ASSAY OF PHOSPHORUS: CPT

## 2024-07-11 PROCEDURE — 99232 SBSQ HOSP IP/OBS MODERATE 35: CPT

## 2024-07-11 PROCEDURE — 80048 BASIC METABOLIC PNL TOTAL CA: CPT

## 2024-07-11 PROCEDURE — 94761 N-INVAS EAR/PLS OXIMETRY MLT: CPT

## 2024-07-11 PROCEDURE — 83735 ASSAY OF MAGNESIUM: CPT

## 2024-07-11 PROCEDURE — 1200000000 HC SEMI PRIVATE

## 2024-07-11 PROCEDURE — 97530 THERAPEUTIC ACTIVITIES: CPT

## 2024-07-11 PROCEDURE — 6370000000 HC RX 637 (ALT 250 FOR IP)

## 2024-07-11 RX ORDER — MECOBALAMIN 5000 MCG
5 TABLET,DISINTEGRATING ORAL NIGHTLY
Status: DISCONTINUED | OUTPATIENT
Start: 2024-07-11 | End: 2024-07-14 | Stop reason: HOSPADM

## 2024-07-11 RX ORDER — ENOXAPARIN SODIUM 100 MG/ML
30 INJECTION SUBCUTANEOUS DAILY
Status: DISCONTINUED | OUTPATIENT
Start: 2024-07-12 | End: 2024-07-14 | Stop reason: HOSPADM

## 2024-07-11 RX ADMIN — ACETAMINOPHEN 650 MG: 325 TABLET ORAL at 22:51

## 2024-07-11 RX ADMIN — SODIUM CHLORIDE, PRESERVATIVE FREE 10 ML: 5 INJECTION INTRAVENOUS at 19:46

## 2024-07-11 RX ADMIN — Medication 5 MG: at 19:42

## 2024-07-11 RX ADMIN — ASPIRIN 81 MG: 81 TABLET, COATED ORAL at 08:39

## 2024-07-11 RX ADMIN — SODIUM CHLORIDE, PRESERVATIVE FREE 10 ML: 5 INJECTION INTRAVENOUS at 08:39

## 2024-07-11 RX ADMIN — LEVOTHYROXINE SODIUM 125 MCG: 0.12 TABLET ORAL at 08:39

## 2024-07-11 RX ADMIN — ATORVASTATIN CALCIUM 20 MG: 10 TABLET, FILM COATED ORAL at 08:39

## 2024-07-11 RX ADMIN — MUPIROCIN: 20 OINTMENT TOPICAL at 08:39

## 2024-07-11 RX ADMIN — AMLODIPINE BESYLATE 10 MG: 5 TABLET ORAL at 08:39

## 2024-07-11 ASSESSMENT — PAIN SCALES - GENERAL
PAINLEVEL_OUTOF10: 3
PAINLEVEL_OUTOF10: 0
PAINLEVEL_OUTOF10: 0

## 2024-07-11 ASSESSMENT — PAIN DESCRIPTION - LOCATION: LOCATION: GENERALIZED

## 2024-07-11 NOTE — ANESTHESIA POSTPROCEDURE EVALUATION
Department of Anesthesiology  Postprocedure Note    Patient: Sheyla Byrd  MRN: [id number]  YOB: 1926  Date of evaluation: 7/11/2024    Procedure Summary       Date: 07/10/24 Room / Location: [facility] CATH/EP LAB 4 / [facility] CARDIAC CATH LAB    Anesthesia Start: 1020 Anesthesia Stop: 1221    Procedure: Insert PPM dual Diagnosis:       CHB (complete heart block) (HCC)      (Arrhythmia [I49.9])    Providers: Sachin Hu MD Responsible Provider: Josef Cook MD    Anesthesia Type: MAC ASA Status: 3            Anesthesia Type: No value filed.    Shilpa Phase I:      Shilpa Phase II:      Anesthesia Post Evaluation    Patient location during evaluation: PACU  Level of consciousness: awake  Airway patency: patent  Nausea & Vomiting: no vomiting  Cardiovascular status: blood pressure returned to baseline  Respiratory status: acceptable  Hydration status: stable  Pain management: adequate    There were no known notable events for this encounter.

## 2024-07-12 LAB
ANION GAP SERPL CALCULATED.3IONS-SCNC: 12 MMOL/L (ref 3–16)
BUN SERPL-MCNC: 42 MG/DL (ref 7–20)
CALCIUM SERPL-MCNC: 9.2 MG/DL (ref 8.3–10.6)
CHLORIDE SERPL-SCNC: 102 MMOL/L (ref 99–110)
CO2 SERPL-SCNC: 23 MMOL/L (ref 21–32)
CREAT SERPL-MCNC: 1.3 MG/DL (ref 0.6–1.2)
DEPRECATED RDW RBC AUTO: 13.9 % (ref 12.4–15.4)
GFR SERPLBLD CREATININE-BSD FMLA CKD-EPI: 37 ML/MIN/{1.73_M2}
GLUCOSE SERPL-MCNC: 128 MG/DL (ref 70–99)
HCT VFR BLD AUTO: 32.7 % (ref 36–48)
HGB BLD-MCNC: 11.1 G/DL (ref 12–16)
MAGNESIUM SERPL-MCNC: 2.2 MG/DL (ref 1.8–2.4)
MCH RBC QN AUTO: 31.3 PG (ref 26–34)
MCHC RBC AUTO-ENTMCNC: 33.8 G/DL (ref 31–36)
MCV RBC AUTO: 92.6 FL (ref 80–100)
PHOSPHATE SERPL-MCNC: 3.9 MG/DL (ref 2.5–4.9)
PLATELET # BLD AUTO: 168 K/UL (ref 135–450)
PMV BLD AUTO: 8.6 FL (ref 5–10.5)
POTASSIUM SERPL-SCNC: 3.9 MMOL/L (ref 3.5–5.1)
RBC # BLD AUTO: 3.54 M/UL (ref 4–5.2)
SODIUM SERPL-SCNC: 137 MMOL/L (ref 136–145)
TROPONIN, HIGH SENSITIVITY: 46 NG/L (ref 0–14)
TROPONIN, HIGH SENSITIVITY: 47 NG/L (ref 0–14)
TROPONIN, HIGH SENSITIVITY: 50 NG/L (ref 0–14)
WBC # BLD AUTO: 7.7 K/UL (ref 4–11)

## 2024-07-12 PROCEDURE — 6360000002 HC RX W HCPCS: Performed by: STUDENT IN AN ORGANIZED HEALTH CARE EDUCATION/TRAINING PROGRAM

## 2024-07-12 PROCEDURE — 84484 ASSAY OF TROPONIN QUANT: CPT

## 2024-07-12 PROCEDURE — 6370000000 HC RX 637 (ALT 250 FOR IP)

## 2024-07-12 PROCEDURE — 51702 INSERT TEMP BLADDER CATH: CPT

## 2024-07-12 PROCEDURE — 97116 GAIT TRAINING THERAPY: CPT

## 2024-07-12 PROCEDURE — 97530 THERAPEUTIC ACTIVITIES: CPT

## 2024-07-12 PROCEDURE — 51798 US URINE CAPACITY MEASURE: CPT

## 2024-07-12 PROCEDURE — 85027 COMPLETE CBC AUTOMATED: CPT

## 2024-07-12 PROCEDURE — 83735 ASSAY OF MAGNESIUM: CPT

## 2024-07-12 PROCEDURE — 2580000003 HC RX 258: Performed by: INTERNAL MEDICINE

## 2024-07-12 PROCEDURE — 6370000000 HC RX 637 (ALT 250 FOR IP): Performed by: INTERNAL MEDICINE

## 2024-07-12 PROCEDURE — 1200000000 HC SEMI PRIVATE

## 2024-07-12 PROCEDURE — 97535 SELF CARE MNGMENT TRAINING: CPT

## 2024-07-12 PROCEDURE — 36415 COLL VENOUS BLD VENIPUNCTURE: CPT

## 2024-07-12 PROCEDURE — 80048 BASIC METABOLIC PNL TOTAL CA: CPT

## 2024-07-12 PROCEDURE — 84100 ASSAY OF PHOSPHORUS: CPT

## 2024-07-12 RX ADMIN — AMLODIPINE BESYLATE 10 MG: 5 TABLET ORAL at 08:18

## 2024-07-12 RX ADMIN — ENOXAPARIN SODIUM 30 MG: 100 INJECTION SUBCUTANEOUS at 08:19

## 2024-07-12 RX ADMIN — LEVOTHYROXINE SODIUM 125 MCG: 0.12 TABLET ORAL at 05:58

## 2024-07-12 RX ADMIN — SODIUM CHLORIDE, PRESERVATIVE FREE 10 ML: 5 INJECTION INTRAVENOUS at 20:50

## 2024-07-12 RX ADMIN — ACETAMINOPHEN 650 MG: 325 TABLET ORAL at 15:47

## 2024-07-12 RX ADMIN — ASPIRIN 81 MG: 81 TABLET, COATED ORAL at 08:18

## 2024-07-12 RX ADMIN — Medication 5 MG: at 20:47

## 2024-07-12 RX ADMIN — SODIUM CHLORIDE, PRESERVATIVE FREE 10 ML: 5 INJECTION INTRAVENOUS at 08:19

## 2024-07-12 RX ADMIN — ATORVASTATIN CALCIUM 20 MG: 10 TABLET, FILM COATED ORAL at 08:18

## 2024-07-12 ASSESSMENT — PAIN SCALES - GENERAL
PAINLEVEL_OUTOF10: 0
PAINLEVEL_OUTOF10: 3

## 2024-07-12 ASSESSMENT — PAIN DESCRIPTION - LOCATION: LOCATION: BACK

## 2024-07-12 NOTE — PLAN OF CARE
Problem: Discharge Planning  Goal: Discharge to home or other facility with appropriate resources  Outcome: Progressing  Flowsheets (Taken 7/11/2024 1941)  Discharge to home or other facility with appropriate resources: Identify barriers to discharge with patient and caregiver     Problem: ABCDS Injury Assessment  Goal: Absence of physical injury  Outcome: Progressing     Problem: Skin/Tissue Integrity  Goal: Absence of new skin breakdown  Description: 1.  Monitor for areas of redness and/or skin breakdown  2.  Assess vascular access sites hourly  3.  Every 4-6 hours minimum:  Change oxygen saturation probe site  4.  Every 4-6 hours:  If on nasal continuous positive airway pressure, respiratory therapy assess nares and determine need for appliance change or resting period.  Outcome: Progressing     Problem: Safety - Adult  Goal: Free from fall injury  Outcome: Progressing

## 2024-07-13 LAB
ANION GAP SERPL CALCULATED.3IONS-SCNC: 11 MMOL/L (ref 3–16)
BUN SERPL-MCNC: 41 MG/DL (ref 7–20)
CALCIUM SERPL-MCNC: 9.2 MG/DL (ref 8.3–10.6)
CHLORIDE SERPL-SCNC: 102 MMOL/L (ref 99–110)
CO2 SERPL-SCNC: 24 MMOL/L (ref 21–32)
CREAT SERPL-MCNC: 1.3 MG/DL (ref 0.6–1.2)
DEPRECATED RDW RBC AUTO: 13.8 % (ref 12.4–15.4)
GFR SERPLBLD CREATININE-BSD FMLA CKD-EPI: 37 ML/MIN/{1.73_M2}
GLUCOSE SERPL-MCNC: 108 MG/DL (ref 70–99)
HCT VFR BLD AUTO: 33.5 % (ref 36–48)
HGB BLD-MCNC: 11.5 G/DL (ref 12–16)
MCH RBC QN AUTO: 32.1 PG (ref 26–34)
MCHC RBC AUTO-ENTMCNC: 34.3 G/DL (ref 31–36)
MCV RBC AUTO: 93.5 FL (ref 80–100)
PLATELET # BLD AUTO: 167 K/UL (ref 135–450)
PMV BLD AUTO: 8.6 FL (ref 5–10.5)
POTASSIUM SERPL-SCNC: 4.2 MMOL/L (ref 3.5–5.1)
RBC # BLD AUTO: 3.58 M/UL (ref 4–5.2)
SODIUM SERPL-SCNC: 137 MMOL/L (ref 136–145)
WBC # BLD AUTO: 6.6 K/UL (ref 4–11)

## 2024-07-13 PROCEDURE — 80048 BASIC METABOLIC PNL TOTAL CA: CPT

## 2024-07-13 PROCEDURE — 6360000002 HC RX W HCPCS: Performed by: STUDENT IN AN ORGANIZED HEALTH CARE EDUCATION/TRAINING PROGRAM

## 2024-07-13 PROCEDURE — 1200000000 HC SEMI PRIVATE

## 2024-07-13 PROCEDURE — 85027 COMPLETE CBC AUTOMATED: CPT

## 2024-07-13 PROCEDURE — 6370000000 HC RX 637 (ALT 250 FOR IP): Performed by: INTERNAL MEDICINE

## 2024-07-13 PROCEDURE — 51701 INSERT BLADDER CATHETER: CPT

## 2024-07-13 PROCEDURE — 51798 US URINE CAPACITY MEASURE: CPT

## 2024-07-13 PROCEDURE — 2580000003 HC RX 258: Performed by: INTERNAL MEDICINE

## 2024-07-13 PROCEDURE — 6370000000 HC RX 637 (ALT 250 FOR IP)

## 2024-07-13 PROCEDURE — 36415 COLL VENOUS BLD VENIPUNCTURE: CPT

## 2024-07-13 RX ADMIN — LEVOTHYROXINE SODIUM 125 MCG: 0.12 TABLET ORAL at 05:43

## 2024-07-13 RX ADMIN — ACETAMINOPHEN 650 MG: 325 TABLET ORAL at 15:17

## 2024-07-13 RX ADMIN — ATORVASTATIN CALCIUM 20 MG: 10 TABLET, FILM COATED ORAL at 09:46

## 2024-07-13 RX ADMIN — Medication 5 MG: at 21:31

## 2024-07-13 RX ADMIN — ASPIRIN 81 MG: 81 TABLET, COATED ORAL at 09:46

## 2024-07-13 RX ADMIN — ENOXAPARIN SODIUM 30 MG: 100 INJECTION SUBCUTANEOUS at 09:46

## 2024-07-13 RX ADMIN — SODIUM CHLORIDE, PRESERVATIVE FREE 10 ML: 5 INJECTION INTRAVENOUS at 21:32

## 2024-07-13 RX ADMIN — SODIUM CHLORIDE, PRESERVATIVE FREE 10 ML: 5 INJECTION INTRAVENOUS at 09:47

## 2024-07-13 RX ADMIN — AMLODIPINE BESYLATE 10 MG: 5 TABLET ORAL at 09:46

## 2024-07-13 ASSESSMENT — PAIN DESCRIPTION - LOCATION: LOCATION: BACK;LEG

## 2024-07-13 ASSESSMENT — PAIN SCALES - GENERAL: PAINLEVEL_OUTOF10: 6

## 2024-07-13 NOTE — PLAN OF CARE
Problem: Safety - Adult  Goal: Free from fall injury  7/13/2024 1006 by Noelle George RN  Flowsheets (Taken 7/13/2024 1006)  Free From Fall Injury: Instruct family/caregiver on patient safety  7/12/2024 2223 by Aixa Irvin RN  Outcome: Progressing     Problem: ABCDS Injury Assessment  Goal: Absence of physical injury  7/13/2024 1006 by Noelle George RN  Flowsheets (Taken 7/13/2024 1006)  Absence of Physical Injury: Implement safety measures based on patient assessment  7/12/2024 2223 by Aixa Irvin, RN  Outcome: Progressing

## 2024-07-13 NOTE — PLAN OF CARE
Problem: Discharge Planning  Goal: Discharge to home or other facility with appropriate resources  7/12/2024 2223 by Aixa Irvin RN  Outcome: Progressing  7/12/2024 1444 by Mario Beltran RN  Outcome: Progressing     Problem: ABCDS Injury Assessment  Goal: Absence of physical injury  7/12/2024 2223 by Aixa Irvin RN  Outcome: Progressing  7/12/2024 1444 by Mario Beltran RN  Outcome: Progressing     Problem: Skin/Tissue Integrity  Goal: Absence of new skin breakdown  Description: 1.  Monitor for areas of redness and/or skin breakdown  2.  Assess vascular access sites hourly  3.  Every 4-6 hours minimum:  Change oxygen saturation probe site  4.  Every 4-6 hours:  If on nasal continuous positive airway pressure, respiratory therapy assess nares and determine need for appliance change or resting period.  7/12/2024 2223 by Aixa Irvin RN  Outcome: Progressing  7/12/2024 1444 by Mario Beltran RN  Outcome: Progressing     Problem: Safety - Adult  Goal: Free from fall injury  Outcome: Progressing     Problem: Neurosensory - Adult  Goal: Achieves stable or improved neurological status  Reactivated     Problem: Genitourinary - Adult  Goal: Urinary catheter remains patent  Reactivated     Problem: Skin/Tissue Integrity - Adult  Goal: Skin integrity remains intact  Reactivated

## 2024-07-14 ENCOUNTER — HOSPITAL ENCOUNTER (INPATIENT)
Age: 89
DRG: 948 | End: 2024-07-14
Attending: STUDENT IN AN ORGANIZED HEALTH CARE EDUCATION/TRAINING PROGRAM | Admitting: STUDENT IN AN ORGANIZED HEALTH CARE EDUCATION/TRAINING PROGRAM
Payer: MEDICARE

## 2024-07-14 VITALS
WEIGHT: 123.46 LBS | OXYGEN SATURATION: 92 % | RESPIRATION RATE: 18 BRPM | HEART RATE: 80 BPM | DIASTOLIC BLOOD PRESSURE: 72 MMHG | SYSTOLIC BLOOD PRESSURE: 131 MMHG | TEMPERATURE: 98.5 F | BODY MASS INDEX: 24.24 KG/M2 | HEIGHT: 60 IN

## 2024-07-14 LAB
ANION GAP SERPL CALCULATED.3IONS-SCNC: 14 MMOL/L (ref 3–16)
BACTERIA URNS QL MICRO: ABNORMAL /HPF
BILIRUB UR QL STRIP.AUTO: NEGATIVE
BUN SERPL-MCNC: 36 MG/DL (ref 7–20)
CALCIUM SERPL-MCNC: 8.6 MG/DL (ref 8.3–10.6)
CHLORIDE SERPL-SCNC: 98 MMOL/L (ref 99–110)
CLARITY UR: ABNORMAL
CO2 SERPL-SCNC: 22 MMOL/L (ref 21–32)
COLOR UR: YELLOW
CREAT SERPL-MCNC: 1.2 MG/DL (ref 0.6–1.2)
DEPRECATED RDW RBC AUTO: 13.9 % (ref 12.4–15.4)
GFR SERPLBLD CREATININE-BSD FMLA CKD-EPI: 41 ML/MIN/{1.73_M2}
GLUCOSE SERPL-MCNC: 132 MG/DL (ref 70–99)
GLUCOSE UR STRIP.AUTO-MCNC: NEGATIVE MG/DL
HCT VFR BLD AUTO: 31.4 % (ref 36–48)
HGB BLD-MCNC: 10.9 G/DL (ref 12–16)
HGB UR QL STRIP.AUTO: ABNORMAL
KETONES UR STRIP.AUTO-MCNC: NEGATIVE MG/DL
LEUKOCYTE ESTERASE UR QL STRIP.AUTO: ABNORMAL
MCH RBC QN AUTO: 32.2 PG (ref 26–34)
MCHC RBC AUTO-ENTMCNC: 34.7 G/DL (ref 31–36)
MCV RBC AUTO: 92.8 FL (ref 80–100)
NITRITE UR QL STRIP.AUTO: NEGATIVE
PH UR STRIP.AUTO: 6 [PH] (ref 5–8)
PLATELET # BLD AUTO: 185 K/UL (ref 135–450)
PMV BLD AUTO: 7.9 FL (ref 5–10.5)
POTASSIUM SERPL-SCNC: 4.3 MMOL/L (ref 3.5–5.1)
PROT UR STRIP.AUTO-MCNC: ABNORMAL MG/DL
RBC # BLD AUTO: 3.39 M/UL (ref 4–5.2)
RBC #/AREA URNS HPF: ABNORMAL /HPF (ref 0–4)
SODIUM SERPL-SCNC: 134 MMOL/L (ref 136–145)
SP GR UR STRIP.AUTO: 1.02 (ref 1–1.03)
UA COMPLETE W REFLEX CULTURE PNL UR: YES
UA DIPSTICK W REFLEX MICRO PNL UR: YES
URN SPEC COLLECT METH UR: ABNORMAL
UROBILINOGEN UR STRIP-ACNC: 0.2 E.U./DL
WBC # BLD AUTO: 7.3 K/UL (ref 4–11)
WBC #/AREA URNS HPF: >100 /HPF (ref 0–5)

## 2024-07-14 PROCEDURE — 6370000000 HC RX 637 (ALT 250 FOR IP): Performed by: STUDENT IN AN ORGANIZED HEALTH CARE EDUCATION/TRAINING PROGRAM

## 2024-07-14 PROCEDURE — 97530 THERAPEUTIC ACTIVITIES: CPT

## 2024-07-14 PROCEDURE — 80048 BASIC METABOLIC PNL TOTAL CA: CPT

## 2024-07-14 PROCEDURE — 97110 THERAPEUTIC EXERCISES: CPT

## 2024-07-14 PROCEDURE — 51702 INSERT TEMP BLADDER CATH: CPT

## 2024-07-14 PROCEDURE — 85027 COMPLETE CBC AUTOMATED: CPT

## 2024-07-14 PROCEDURE — 1280000000 HC REHAB R&B

## 2024-07-14 PROCEDURE — 2580000003 HC RX 258: Performed by: INTERNAL MEDICINE

## 2024-07-14 PROCEDURE — 36415 COLL VENOUS BLD VENIPUNCTURE: CPT

## 2024-07-14 PROCEDURE — 6370000000 HC RX 637 (ALT 250 FOR IP): Performed by: INTERNAL MEDICINE

## 2024-07-14 PROCEDURE — 51798 US URINE CAPACITY MEASURE: CPT

## 2024-07-14 PROCEDURE — 87077 CULTURE AEROBIC IDENTIFY: CPT

## 2024-07-14 PROCEDURE — 81001 URINALYSIS AUTO W/SCOPE: CPT

## 2024-07-14 PROCEDURE — 6360000002 HC RX W HCPCS: Performed by: STUDENT IN AN ORGANIZED HEALTH CARE EDUCATION/TRAINING PROGRAM

## 2024-07-14 PROCEDURE — 87086 URINE CULTURE/COLONY COUNT: CPT

## 2024-07-14 PROCEDURE — 87186 SC STD MICRODIL/AGAR DIL: CPT

## 2024-07-14 PROCEDURE — 51701 INSERT BLADDER CATHETER: CPT

## 2024-07-14 RX ORDER — ONDANSETRON 4 MG/1
4 TABLET, ORALLY DISINTEGRATING ORAL EVERY 8 HOURS PRN
Status: CANCELLED | OUTPATIENT
Start: 2024-07-14

## 2024-07-14 RX ORDER — ENOXAPARIN SODIUM 100 MG/ML
30 INJECTION SUBCUTANEOUS DAILY
Status: DISCONTINUED | OUTPATIENT
Start: 2024-07-15 | End: 2024-07-27 | Stop reason: HOSPADM

## 2024-07-14 RX ORDER — ATORVASTATIN CALCIUM 10 MG/1
20 TABLET, FILM COATED ORAL DAILY
Status: DISCONTINUED | OUTPATIENT
Start: 2024-07-15 | End: 2024-07-27 | Stop reason: HOSPADM

## 2024-07-14 RX ORDER — FLUTICASONE PROPIONATE 50 MCG
1 SPRAY, SUSPENSION (ML) NASAL PRN
Status: CANCELLED | OUTPATIENT
Start: 2024-07-14

## 2024-07-14 RX ORDER — ASPIRIN 81 MG/1
81 TABLET ORAL DAILY
Status: DISCONTINUED | OUTPATIENT
Start: 2024-07-15 | End: 2024-07-27 | Stop reason: HOSPADM

## 2024-07-14 RX ORDER — ACETAMINOPHEN 325 MG/1
650 TABLET ORAL EVERY 4 HOURS PRN
Status: DISCONTINUED | OUTPATIENT
Start: 2024-07-14 | End: 2024-07-27 | Stop reason: HOSPADM

## 2024-07-14 RX ORDER — MECLIZINE HCL 12.5 MG/1
25 TABLET ORAL 3 TIMES DAILY PRN
Status: CANCELLED | OUTPATIENT
Start: 2024-07-14

## 2024-07-14 RX ORDER — AMLODIPINE BESYLATE 5 MG/1
10 TABLET ORAL DAILY
Status: DISCONTINUED | OUTPATIENT
Start: 2024-07-15 | End: 2024-07-16

## 2024-07-14 RX ORDER — AMLODIPINE BESYLATE 5 MG/1
10 TABLET ORAL DAILY
Status: CANCELLED | OUTPATIENT
Start: 2024-07-15

## 2024-07-14 RX ORDER — ENOXAPARIN SODIUM 100 MG/ML
30 INJECTION SUBCUTANEOUS DAILY
Status: CANCELLED | OUTPATIENT
Start: 2024-07-14

## 2024-07-14 RX ORDER — FLUTICASONE PROPIONATE 50 MCG
1 SPRAY, SUSPENSION (ML) NASAL PRN
Status: DISCONTINUED | OUTPATIENT
Start: 2024-07-14 | End: 2024-07-27 | Stop reason: HOSPADM

## 2024-07-14 RX ORDER — ACETAMINOPHEN 325 MG/1
650 TABLET ORAL EVERY 4 HOURS PRN
Status: CANCELLED | OUTPATIENT
Start: 2024-07-14

## 2024-07-14 RX ORDER — POLYETHYLENE GLYCOL 3350 17 G/17G
17 POWDER, FOR SOLUTION ORAL DAILY PRN
Status: CANCELLED | OUTPATIENT
Start: 2024-07-14

## 2024-07-14 RX ORDER — ONDANSETRON 4 MG/1
4 TABLET, ORALLY DISINTEGRATING ORAL EVERY 8 HOURS PRN
Status: DISCONTINUED | OUTPATIENT
Start: 2024-07-14 | End: 2024-07-27 | Stop reason: HOSPADM

## 2024-07-14 RX ORDER — ATORVASTATIN CALCIUM 20 MG/1
20 TABLET, FILM COATED ORAL DAILY
Qty: 30 TABLET | Refills: 0 | Status: ON HOLD | DISCHARGE
Start: 2024-07-15

## 2024-07-14 RX ORDER — MECLIZINE HCL 12.5 MG/1
25 TABLET ORAL 3 TIMES DAILY PRN
Status: DISCONTINUED | OUTPATIENT
Start: 2024-07-14 | End: 2024-07-27 | Stop reason: HOSPADM

## 2024-07-14 RX ORDER — ASPIRIN 81 MG/1
81 TABLET ORAL DAILY
Status: CANCELLED | OUTPATIENT
Start: 2024-07-15

## 2024-07-14 RX ORDER — LEVOTHYROXINE SODIUM 0.12 MG/1
125 TABLET ORAL
Status: DISCONTINUED | OUTPATIENT
Start: 2024-07-15 | End: 2024-07-27 | Stop reason: HOSPADM

## 2024-07-14 RX ORDER — ATORVASTATIN CALCIUM 10 MG/1
20 TABLET, FILM COATED ORAL DAILY
Status: CANCELLED | OUTPATIENT
Start: 2024-07-15

## 2024-07-14 RX ORDER — LEVOTHYROXINE SODIUM 0.12 MG/1
125 TABLET ORAL
Status: CANCELLED | OUTPATIENT
Start: 2024-07-15

## 2024-07-14 RX ORDER — POLYETHYLENE GLYCOL 3350 17 G/17G
17 POWDER, FOR SOLUTION ORAL DAILY PRN
Status: DISCONTINUED | OUTPATIENT
Start: 2024-07-14 | End: 2024-07-27 | Stop reason: HOSPADM

## 2024-07-14 RX ORDER — MECOBALAMIN 5000 MCG
5 TABLET,DISINTEGRATING ORAL NIGHTLY
Status: DISCONTINUED | OUTPATIENT
Start: 2024-07-14 | End: 2024-07-27 | Stop reason: HOSPADM

## 2024-07-14 RX ORDER — MECOBALAMIN 5000 MCG
5 TABLET,DISINTEGRATING ORAL NIGHTLY
Status: CANCELLED | OUTPATIENT
Start: 2024-07-14

## 2024-07-14 RX ADMIN — LEVOTHYROXINE SODIUM 125 MCG: 0.12 TABLET ORAL at 07:55

## 2024-07-14 RX ADMIN — ASPIRIN 81 MG: 81 TABLET, COATED ORAL at 07:54

## 2024-07-14 RX ADMIN — ATORVASTATIN CALCIUM 20 MG: 10 TABLET, FILM COATED ORAL at 07:54

## 2024-07-14 RX ADMIN — SODIUM CHLORIDE, PRESERVATIVE FREE 10 ML: 5 INJECTION INTRAVENOUS at 09:02

## 2024-07-14 RX ADMIN — Medication 5 MG: at 20:12

## 2024-07-14 RX ADMIN — ACETAMINOPHEN 650 MG: 325 TABLET ORAL at 19:19

## 2024-07-14 RX ADMIN — ENOXAPARIN SODIUM 30 MG: 100 INJECTION SUBCUTANEOUS at 07:55

## 2024-07-14 RX ADMIN — AMLODIPINE BESYLATE 10 MG: 5 TABLET ORAL at 07:54

## 2024-07-14 ASSESSMENT — PAIN SCALES - GENERAL
PAINLEVEL_OUTOF10: 0
PAINLEVEL_OUTOF10: 4
PAINLEVEL_OUTOF10: 0

## 2024-07-14 ASSESSMENT — PAIN DESCRIPTION - DESCRIPTORS: DESCRIPTORS: ACHING;SORE

## 2024-07-14 ASSESSMENT — PAIN DESCRIPTION - PAIN TYPE: TYPE: ACUTE PAIN

## 2024-07-14 ASSESSMENT — PAIN DESCRIPTION - FREQUENCY: FREQUENCY: INTERMITTENT

## 2024-07-14 NOTE — DISCHARGE INSTR - COC
Look to see if this can be removed prior to discharge. Otherwise, may need follow up with Urology.     Physician Certification: I certify the above information and transfer of Sheyla Byrd  is necessary for the continuing treatment of the diagnosis listed and that she requires Acute Rehab for less 30 days.     Update Admission H&P: No change in H&P    PHYSICIAN SIGNATURE:  Electronically signed by Josef Naranjo DO on 7/14/24 at 10:46 AM EDT

## 2024-07-14 NOTE — PLAN OF CARE
ARU PATIENT TREATMENT PLAN  [facility]  [address] [place]  [address]  [phone]    Sheyla Byrd    : 1926  [id number]  MRN: [id number]   PHYSICIAN:  Jenny Xie MD  Primary Problem    Patient Active Problem List   Diagnosis    HTN (hypertension)    Hyponatremia    Hypokalemia    Light headed    Generalized weakness    Colitis    Pneumonia    Acute respiratory failure with hypoxia (HCC)    Pulmonary infiltrates    HH (hiatus hernia)    Restrictive lung disease    Chest congestion    Diastolic dysfunction    Chills    Hypothyroidism    Pericardial effusion    Arrhythmia    Bradycardia    Pacemaker-[employer] DC LEONARDO implant 7/10/24-MRI COMPATIBLE       Rehabilitation Diagnosis:     Bradycardia [R00.1]       ADMIT DATE:2024    Patient Goals: \"get out of here...be able to stand up and walk around\"     Admitting Impairments: Pt. Admitted d/t bradycardia resulting in Decreased functional mobility ;Decreased safe awareness;Decreased balance;Decreased ADL status;Decreased cognition;Decreased posture;Decreased endurance;Decreased high-level IADLs;Decreased strength;Decreased coordination;Decreased ROM   Barriers: level of assistance, endurance, comorbidities   Participation: Fair     CARE PLAN     NURSING:  Sheyla Byrd while on this unit will:     [x] Be continent of bowel and bladder     [x] Have an adequate number of bowel movements  [x] Urinate with no urinary retention >300ml in bladder  [x] Complete bladder protocol with lagunas removal  [x] Maintain O2 SATs at __92_%  [x] Have pain managed while on ARU       [x] Be pain free by discharge   [x] Have no skin breakdown while on ARU  [] Have improved skin integrity via wound measurements  [] Have no signs/symptoms of infection at the wound site  [x] Be free from injury during hospitalization   [x] Complete education with patient/family with understanding demonstrated for:  [x] Adjustment   []

## 2024-07-14 NOTE — PROGRESS NOTES
NURSING ASSESSMENT: ARU ADMISSION  [facility]    Patient:Sheyla Byrd     Rehab Dx/Hx: Arrhythmia   :1926  MRN:[id number]  Date of Admit: 2024  Room #: 0154/0154-01    Subjective:   Patient admitted to room 154 from B3 via wheelchair.   Alert and oriented x4.  Oriented to room and call light system. Oriented to rehab routine and therapy schedules. Informed about care conferences and ordering of meals with PCA.    Drug / Medication Review:   Medications were reviewed by RN at time of admission  [x]  No potential or actual clinically significant medication issues were noted.   []  Potential or actual clinically significant medication issues were found and MD was notified. (Specified below if applicable)   []  Allergy to medication   []  Drug interactions (drug/drug, drug/food, drug/disease interactions)   []  Duplicate drug   []  Omission (drug missing from prescribed regimen)   []  Non adherence   []  Adverse reaction   []  Wrong patient, drug, dose, route, time error   []  Ineffective drug therapy    Section GG: Rolling Right and Left   []  Code 06, Independent: if the patient completes the activity by themself with no assistance from a helper.   []  Code 05, Setup or clean up assist: if the helper sets up or cleans up; patient completes activity   []  Code 04, Supervision or touching assist: If the helper provides verbal cues and/or touching/steadying and/or contact guard assistance as patient completes activity   []  Code 03, Partial/Moderate Assist: If the helper does LESS THAN HALF the effort. [place] lifts, holds, or supports trunk or limbs, but provides less than half the effort.   [x]  Code 02, Substantial/Maximal Assist: if the helper does MORE THAN HALF the effort. [place] lifts or holds trunk or limbs and provides more than half the effort.  []  Code 01,Dependent: If the helper does ALL of the effort. Patient does none of the effort to complete the activity; or the  assistance of two or more helpers is required for the patient to complete the activity   []  Code 07 Patient Refused   []  Code 09: Not applicable  []  Code 88: Not attempted due to medical condition or safety concerns: if the activity was not attempted due to medical condition or safety concerns, but the patient could perform the activity prior to the current illness, exacerbation, or injury.          Patient Mood Interview    Symptom Presence  0. No (enter 0 in column 2)  1. Yes (enter 0-3 in column 2)  9. No response (leave column 2 blank  Symptom Frequency  0. Never or 1 day  1. 2-6 days (several days)  2. 7-11 days (half or more days)  3. 12-14 days (nearly everyday) 1. Symptom Presence 2. Symptom Frequency    Enter scores in boxes   Little interest or pleasure in doing things   0 0   Feeling down, depressed, or hopeless   0 0   If either A or B is coded 2 or 3, CONTINUE asking the questions below.  If not, END the interview.     Trouble falling or staying asleep, or sleeping too much       Feeling tired or having little energy       Poor appetite or overeating       Feeling bad about yourself - or that you are a failure or have let yourself or your family down       Trouble concentrating on things, such as reading the newspaper or watching television       Moving or speaking so slowly that other people could have noticed.  Or the opposite- being so fidgety or restless that you have been moving around a lot more than usual.       Thoughts that you would be better off dead, or of hurting yourself in some way.       Total Severity: Add scores for all frequency responses in column 2       Social isolation (from vivit)  How often do you feel lonely or isolated from those around you?   [x] 0. Never  [] 1. Rarely  [] 2. Sometimes  [] 3. Often  [] 4. Always  [] 7. Patient declines to respond  [] 8. Patient unable to respond       Admission BIMS:  Number of word repeated after first attempt:  []  0. None []

## 2024-07-14 NOTE — PLAN OF CARE
Problem: Discharge Planning  Goal: Discharge to home or other facility with appropriate resources  7/14/2024 0848 by Noelle George RN  Flowsheets (Taken 7/14/2024 0848)  Discharge to home or other facility with appropriate resources: Identify barriers to discharge with patient and caregiver  7/14/2024 0315 by Karmen Vernon LPN  Outcome: Progressing     Problem: Safety - Adult  Goal: Free from fall injury  7/14/2024 0848 by Noelle George RN  Flowsheets (Taken 7/14/2024 0848)  Free From Fall Injury: Instruct family/caregiver on patient safety  7/14/2024 0315 by Karmen Vernon LPN  Outcome: Progressing     Problem: ABCDS Injury Assessment  Goal: Absence of physical injury  7/14/2024 0315 by Karmen Vernon LPN  Outcome: Progressing

## 2024-07-14 NOTE — PLAN OF CARE
Problem: Discharge Planning  Goal: Discharge to home or other facility with appropriate resources  Outcome: Progressing     Problem: ABCDS Injury Assessment  Goal: Absence of physical injury  Outcome: Progressing     Problem: Skin/Tissue Integrity  Goal: Absence of new skin breakdown  Description: 1.  Monitor for areas of redness and/or skin breakdown  2.  Assess vascular access sites hourly  3.  Every 4-6 hours minimum:  Change oxygen saturation probe site  4.  Every 4-6 hours:  If on nasal continuous positive airway pressure, respiratory therapy assess nares and determine need for appliance change or resting period.  Outcome: Progressing     Problem: Safety - Adult  Goal: Free from fall injury  Outcome: Progressing     Problem: Neurosensory - Adult  Goal: Achieves stable or improved neurological status  Outcome: Progressing     Problem: Cardiovascular - Adult  Goal: Maintains optimal cardiac output and hemodynamic stability  Outcome: Progressing  Goal: Absence of cardiac dysrhythmias or at baseline  Outcome: Progressing     Problem: Skin/Tissue Integrity - Adult  Goal: Skin integrity remains intact  Outcome: Progressing     Problem: Musculoskeletal - Adult  Goal: Return mobility to safest level of function  Outcome: Progressing     Problem: Genitourinary - Adult  Goal: Absence of urinary retention  Outcome: Progressing  Goal: Urinary catheter remains patent  Outcome: Progressing     Problem: Infection - Adult  Goal: Absence of infection at discharge  Outcome: Progressing

## 2024-07-15 LAB
ANION GAP SERPL CALCULATED.3IONS-SCNC: 9 MMOL/L (ref 3–16)
BASOPHILS # BLD: 0 K/UL (ref 0–0.2)
BASOPHILS NFR BLD: 0.8 %
BUN SERPL-MCNC: 37 MG/DL (ref 7–20)
CALCIUM SERPL-MCNC: 8.4 MG/DL (ref 8.3–10.6)
CHLORIDE SERPL-SCNC: 102 MMOL/L (ref 99–110)
CO2 SERPL-SCNC: 25 MMOL/L (ref 21–32)
CREAT SERPL-MCNC: 1.2 MG/DL (ref 0.6–1.2)
DEPRECATED RDW RBC AUTO: 14 % (ref 12.4–15.4)
EOSINOPHIL # BLD: 0.4 K/UL (ref 0–0.6)
EOSINOPHIL NFR BLD: 6.4 %
GFR SERPLBLD CREATININE-BSD FMLA CKD-EPI: 41 ML/MIN/{1.73_M2}
GLUCOSE SERPL-MCNC: 106 MG/DL (ref 70–99)
HCT VFR BLD AUTO: 30.2 % (ref 36–48)
HGB BLD-MCNC: 10.3 G/DL (ref 12–16)
LYMPHOCYTES # BLD: 1 K/UL (ref 1–5.1)
LYMPHOCYTES NFR BLD: 17.2 %
MCH RBC QN AUTO: 31.7 PG (ref 26–34)
MCHC RBC AUTO-ENTMCNC: 34.3 G/DL (ref 31–36)
MCV RBC AUTO: 92.3 FL (ref 80–100)
MONOCYTES # BLD: 0.6 K/UL (ref 0–1.3)
MONOCYTES NFR BLD: 9.6 %
NEUTROPHILS # BLD: 3.8 K/UL (ref 1.7–7.7)
NEUTROPHILS NFR BLD: 66 %
PLATELET # BLD AUTO: 181 K/UL (ref 135–450)
PMV BLD AUTO: 8.5 FL (ref 5–10.5)
POTASSIUM SERPL-SCNC: 4 MMOL/L (ref 3.5–5.1)
RBC # BLD AUTO: 3.27 M/UL (ref 4–5.2)
SODIUM SERPL-SCNC: 136 MMOL/L (ref 136–145)
WBC # BLD AUTO: 5.8 K/UL (ref 4–11)

## 2024-07-15 PROCEDURE — 6360000002 HC RX W HCPCS: Performed by: STUDENT IN AN ORGANIZED HEALTH CARE EDUCATION/TRAINING PROGRAM

## 2024-07-15 PROCEDURE — 96125 COGNITIVE TEST BY HC PRO: CPT

## 2024-07-15 PROCEDURE — 85025 COMPLETE CBC W/AUTO DIFF WBC: CPT

## 2024-07-15 PROCEDURE — 80048 BASIC METABOLIC PNL TOTAL CA: CPT

## 2024-07-15 PROCEDURE — 6370000000 HC RX 637 (ALT 250 FOR IP): Performed by: STUDENT IN AN ORGANIZED HEALTH CARE EDUCATION/TRAINING PROGRAM

## 2024-07-15 PROCEDURE — 1280000000 HC REHAB R&B

## 2024-07-15 PROCEDURE — 97110 THERAPEUTIC EXERCISES: CPT

## 2024-07-15 PROCEDURE — 97167 OT EVAL HIGH COMPLEX 60 MIN: CPT

## 2024-07-15 PROCEDURE — 36415 COLL VENOUS BLD VENIPUNCTURE: CPT

## 2024-07-15 PROCEDURE — 97162 PT EVAL MOD COMPLEX 30 MIN: CPT

## 2024-07-15 PROCEDURE — 2580000003 HC RX 258: Performed by: STUDENT IN AN ORGANIZED HEALTH CARE EDUCATION/TRAINING PROGRAM

## 2024-07-15 RX ORDER — BISACODYL 10 MG
10 SUPPOSITORY, RECTAL RECTAL DAILY PRN
Status: DISCONTINUED | OUTPATIENT
Start: 2024-07-15 | End: 2024-07-27 | Stop reason: HOSPADM

## 2024-07-15 RX ADMIN — ATORVASTATIN CALCIUM 20 MG: 10 TABLET, FILM COATED ORAL at 09:20

## 2024-07-15 RX ADMIN — AMLODIPINE BESYLATE 10 MG: 5 TABLET ORAL at 09:20

## 2024-07-15 RX ADMIN — LEVOTHYROXINE SODIUM 125 MCG: 0.12 TABLET ORAL at 05:57

## 2024-07-15 RX ADMIN — ASPIRIN 81 MG: 81 TABLET, COATED ORAL at 09:20

## 2024-07-15 RX ADMIN — CEFTRIAXONE SODIUM 1000 MG: 1 INJECTION, POWDER, FOR SOLUTION INTRAMUSCULAR; INTRAVENOUS at 18:13

## 2024-07-15 RX ADMIN — Medication 5 MG: at 20:20

## 2024-07-15 RX ADMIN — ENOXAPARIN SODIUM 30 MG: 100 INJECTION SUBCUTANEOUS at 09:20

## 2024-07-15 RX ADMIN — ACETAMINOPHEN 650 MG: 325 TABLET ORAL at 14:42

## 2024-07-15 ASSESSMENT — PAIN DESCRIPTION - LOCATION
LOCATION: HIP;SHOULDER
LOCATION: SHOULDER

## 2024-07-15 ASSESSMENT — PAIN DESCRIPTION - DESCRIPTORS
DESCRIPTORS: ACHING
DESCRIPTORS: ACHING

## 2024-07-15 ASSESSMENT — PAIN SCALES - GENERAL
PAINLEVEL_OUTOF10: 0
PAINLEVEL_OUTOF10: 10
PAINLEVEL_OUTOF10: 0

## 2024-07-15 ASSESSMENT — PAIN DESCRIPTION - ORIENTATION: ORIENTATION: RIGHT;LEFT

## 2024-07-15 NOTE — CONSULTS
Comprehensive Nutrition Assessment    Type and Reason for Visit:  Initial, Consult    Nutrition Recommendations/Plan:   Liberalize diet to general   Add Ensure daily   Encourage PO intakes as tolerated   Monitor nutrition adequacy, pertinent labs, bowel habits, wt changes, and clinical progress     Malnutrition Assessment:  Malnutrition Status:  At risk for malnutrition (Comment) (07/15/24 1302)    Context:  Acute Illness     Findings of the 6 clinical characteristics of malnutrition:  Energy Intake:  Mild decrease in energy intake (Comment)    Nutrition Assessment:    New ARU pt admitted with bradycardia. S/p PPM placement on 7/10. Hx of HLD, HTN, kidney cancer, and IBS. Pt nutritionally at risk AEB variable PO intakes. Pt reports good appetite today. Ate greater than 50% of her lunch today. Encouraged good PO intakes for healing. Reports drinking ONS at home, RD to add daily. Recommend liberalizing diet to allow increased meal options. Will monitor further nutritional needs.    Nutrition Related Findings:    Active BS. BM on 7/11. Wound Type: Surgical Incision       Current Nutrition Intake & Therapies:    Average Meal Intake: 1-25%, 26-50%, 51-75%, %  Average Supplements Intake: None Ordered  ADULT DIET; Regular; Low Fat/Low Chol/High Fiber/2 gm Na    Anthropometric Measures:  Height: 152.4 cm (5')  Ideal Body Weight (IBW): 100 lbs (45 kg)       Current Body Weight: 60.3 kg (133 lb), 133 % IBW. Weight Source: Bed Scale  Current BMI (kg/m2): 26                          BMI Categories: Overweight (BMI 25.0-29.9)    Estimated Daily Nutrient Needs:  Energy Requirements Based On: Kcal/kg (25-30)  Weight Used for Energy Requirements: Current  Energy (kcal/day): 9578-5153 kcal  Weight Used for Protein Requirements: Ideal (1.0-1.2 g/kg)  Protein (g/day): 60-72 g  Method Used for Fluid Requirements: 1 ml/kcal  Fluid (ml/day): 0718-2436 mL    Nutrition Diagnosis:   Inadequate oral intake related to inadequate

## 2024-07-15 NOTE — PROGRESS NOTES
Physical Therapy  Facility/Department: [facility]  Rehabilitation Physical Therapy Initial Assessment    NAME: Sheyla Byrd  : 1926 (89 y.o.)  MRN: [id number]  CODE STATUS: Full Code    Date of Service: 7/15/24      Past Medical History:   Diagnosis Date    Amaurosis fugax of left eye 3/15/2011    Benign neoplasm     colon    Cancer of kidney (HCC) 2010    Right    Colonic polyp     Cyst of ovary, left 2012    Dehydration 2011    Diverticulosis of colon     Dyslipidemia     GERD (gastroesophageal reflux disease)     Glaucoma     Hyperlipidemia     Hypertension 1960    IBS (irritable bowel syndrome)     Osteoporosis     Other specified gastritis without mention of hemorrhage     Pericardial effusion 2011     Past Surgical History:   Procedure Laterality Date    BRONCHOSCOPY N/A 2019    BRONCHOSCOPY N/A 2019    BRONCHOSCOPY ALVEOLAR LAVAGE performed by Jorge Padron MD at [facility] ENDOSCOPY    BRONCHOSCOPY  2019    BRONCHOSCOPY THERAPUTIC ASPIRATION INITIAL performed by Jorge Padron MD at [facility] ENDOSCOPY    CHOLECYSTECTOMY, LAPAROSCOPIC  1998    COLONOSCOPY  2009    TA & Diverticulosis    COLONOSCOPY  2001    diverticulosis    COLONOSCOPY  2006    diverticulosis    EP DEVICE PROCEDURE N/A 7/10/2024    Insert PPM dual performed by Sachin Hu MD at [facility] CARDIAC CATH LAB    PARTIAL NEPHRECTOMY  2010    Right    UPPER GASTROINTESTINAL ENDOSCOPY  1998    chronic gastritis    UPPER GASTROINTESTINAL ENDOSCOPY  09/10/1999    chronic gastritis    UPPER GASTROINTESTINAL ENDOSCOPY  2001    chronic gastritis w intestinal metaplasia    UPPER GASTROINTESTINAL ENDOSCOPY  2004    chronic gastritis w intestinal metaplasia    UPPER GASTROINTESTINAL ENDOSCOPY  2007    gastritis       Chart Reviewed: Yes  Patient assessed for rehabilitation services?: Yes  Family / Caregiver Present: Yes (dtr

## 2024-07-15 NOTE — H&P
Patient: Sheyla Byrd  [id number]  Date: 7/15/2024      Chief Complaint: weakness    History of Present Illness/Hospital Course:  Sheyla Byrd is a 89 year old female with a past medical history significant for HTN, restrictive lung disease, CHF not on lasix, and hypothyroidism who presented to [facility] on 7/9/24 with generalized weakness and bradycardia. Per report EMS found her heart rate to be 22. EKG in the ED confirmed heart rate of 24. She was given atropine injections and placed on a isoproterenol gtt. On 7/11 she underwent pacemaker. Post operative course was notable for urinary retention s/p lagunas placement, hyponatremia, and delirium. She was admitted to [facility] on 7/14/24 due to functional deficits below her baseline. Today she is seen with nursing present. She reports feeling well. She denies chest pain or palpitations. Her last bowel movement was a few days ago. She reports living in a condo with her daughter with a stair lift to the second floor.     Prior Level of Function:  Independent for self care, indoor mobility, functional cognition     Current Level of Function:  Mod assist to roll left and right, lying to sitting on side of bed  Max assist for sit to stand  Dependent for toileting hygiene, chair/bed transfer     has a past medical history of Amaurosis fugax of left eye, Benign neoplasm, Cancer of kidney (HCC), Colonic polyp, Cyst of ovary, left, Dehydration, Diverticulosis of colon, Dyslipidemia, GERD (gastroesophageal reflux disease), Glaucoma, Hyperlipidemia, Hypertension, IBS (irritable bowel syndrome), Osteoporosis, Other specified gastritis without mention of hemorrhage, and Pericardial effusion.     has a past surgical history that includes Upper gastrointestinal endoscopy (02/11/1998); Colonoscopy (08/24/2009); Upper gastrointestinal endoscopy (09/10/1999); Colonoscopy (07/17/2001); Upper gastrointestinal endoscopy (07/17/2001); Upper gastrointestinal endoscopy (02/03/2004);  complications, rehabilitation services cannot be safely provided at a lower level of care such as a skilled nursing facility. I have compared the patients medical and functional status at the time of the preadmission screening and the same on this date, and there are no significant changes.     By signing this document, I acknowledge that I have personally performed a full physical examination on this patient within 24 hours of admission to this inpatient rehabilitation facility and have determined the patient to be able to tolerate the above course of treatment at an intensive level for a reasonable period of time. I will be completing a detailed individualized Plan of Care for this patient by day four of the patients stay based upon the Preadmission Screen, this Post-Admission Evaluation, and the therapy evaluations.    Rehabilitation Diagnosis:  Cardiac, 9.0, Cardiac    Assessment and Plan:  Sheyla Byrd is a 89 year old female with a past medical history significant for HTN, restrictive lung disease, CHF not on lasix, and hypothyroidism who presented to [facility] on 7/9/24 with generalized weakness and bradycardia, now s/p pacemaker. She was admitted to [facility] on 7/14/24 due to functional deficits below her baseline.     Bradycardia with heart block  - s/p pacemaker placement 7/11  - follow up with EP outpatient  - PT, OT    Urinary Retention  - lagunas in place  - voiding trial once more mobile     Abnormal UA POA  - low suspicion for infection, no fevers or leukocytosis  - patient with multiple antibiotics allergies  - will follow culture     HTN  - avoid beta-blockers and calcium channel blockers  - amlodipine    HLD/CAD  - asa, atorvastatin    Hypothyroidism  - levothyroxine    Bowels: adjust medications as needed for regular bowel movements     Bladder: Check PVR x 3.  IMC if PVR > 200ml or if any volume is > 500 ml.     Sleep: melatonin qhs     PPX  DVT: lovenox   GI: not indicated    Follow up

## 2024-07-15 NOTE — PROGRESS NOTES
Speech Language Pathology  Facility/Department: [facility]  Initial Speech/Language/Cognitive Assessment       NAME:Sheyla Byrd  : 1926 (89 y.o.)   MRN: [id number]  ROOM: [id number]  ADMISSION DATE: 2024  PATIENT DIAGNOSIS(ES): Bradycardia [R00.1]  Patient Active Problem List    Diagnosis Date Noted    Pericardial effusion 2024    Hyponatremia 03/15/2011    Pacemaker-MEDTRONIC DC LEONARDO implant 7/10/24-MRI COMPATIBLE 07/10/2024    Arrhythmia 2024    Bradycardia 2024    Hypothyroidism 2021    Chills 2021    Pulmonary infiltrates 2019    HH (hiatus hernia) 2019    Restrictive lung disease 2019    Chest congestion 2019    Diastolic dysfunction 2019    Pneumonia 10/30/2019    Acute respiratory failure with hypoxia (HCC) 10/30/2019    Colitis 2018    Generalized weakness 2015    Light headed 2014    Hypokalemia 2012    HTN (hypertension) 03/15/2011     Past Medical History:   Diagnosis Date    Amaurosis fugax of left eye 3/15/2011    Benign neoplasm     colon    Cancer of kidney (HCC) 2010    Right    Colonic polyp 1999    Cyst of ovary, left 2012    Dehydration 2011    Diverticulosis of colon     Dyslipidemia     GERD (gastroesophageal reflux disease)     Glaucoma     Hyperlipidemia     Hypertension 1960    IBS (irritable bowel syndrome)     Osteoporosis     Other specified gastritis without mention of hemorrhage     Pericardial effusion 2011     Past Surgical History:   Procedure Laterality Date    BRONCHOSCOPY N/A 2019    BRONCHOSCOPY N/A 2019    BRONCHOSCOPY ALVEOLAR LAVAGE performed by Jorge Padron MD at [facility] ENDOSCOPY    BRONCHOSCOPY  2019    BRONCHOSCOPY THERAPUTIC ASPIRATION INITIAL performed by Jorge Padron MD at [facility] ENDOSCOPY    CHOLECYSTECTOMY, LAPAROSCOPIC  1998    COLONOSCOPY  2009    TA & Diverticulosis    COLONOSCOPY  2001     diverticulosis    COLONOSCOPY  08/29/2006    diverticulosis    EP DEVICE PROCEDURE N/A 7/10/2024    Insert PPM dual performed by Sachin Hu MD at [facility] CARDIAC CATH LAB    PARTIAL NEPHRECTOMY  11/03/2010    Right    UPPER GASTROINTESTINAL ENDOSCOPY  02/11/1998    chronic gastritis    UPPER GASTROINTESTINAL ENDOSCOPY  09/10/1999    chronic gastritis    UPPER GASTROINTESTINAL ENDOSCOPY  07/17/2001    chronic gastritis w intestinal metaplasia    UPPER GASTROINTESTINAL ENDOSCOPY  02/03/2004    chronic gastritis w intestinal metaplasia    UPPER GASTROINTESTINAL ENDOSCOPY  11/29/2007    gastritis     Allergies   Allergen Reactions    Avelox [Moxifloxacin Hydrochloride]     Benadryl [Diphenhydramine Hcl]     Biaxin [Clarithromycin]     Cefuroxime     Codeine     Doxycycline     Morphine     Norgesic [Orphenadrine-Aspirin-Caffeine]     Opium     Penicillins     Promethazine     Propoxyphene     Sulfa Antibiotics     Tetracyclines & Related     Tramadol        Date of Evaluation: 7/15/2024   Evaluating Therapist: ALVARO Hogan    Subjective:   Chart Reviewed: : [x] Yes [] No    Onset Date: 07/09/24    Recent Results of CT of Head / MRI:  Date: 07/10/2024  IMPRESSION:  1. No acute intracranial findings.  2. Left mastoid effusion.  3. Mucosal thickening in the sphenoid sinus.      Medical record review/interview: Per MD H&P on 7/15/24: \"Sheyla Byrd is a 89 year old female with a past medical history significant for HTN, restrictive lung disease, CHF not on lasix, and hypothyroidism who presented to [facility] on 7/9/24 with generalized weakness and bradycardia. Per report EMS found her heart rate to be 22. EKG in the ED confirmed heart rate of 24. She was given atropine injections and placed on a isoproterenol gtt. On 7/11 she underwent pacemaker. Post operative course was notable for urinary retention s/p lagunas placement, hyponatremia, and delirium. She was admitted to [facility] on 7/14/24 due to

## 2024-07-15 NOTE — PLAN OF CARE
IRF JAN Admission Assessment  [facility] Acute Rehab Unit    Patient:Sheyla Byrd     Rehab Dx/Hx: Bradycardia [R00.1]   :1926  MRN:[id number]  Date of Admit: 2024     Pain Effect on Sleep:  Over the past 5 days, how much of the time has pain made it hard for you to sleep at night?  [x]  0. Does not apply - I have not had any pain or hurting in the past 5 days  []  1. Rarely or not at all  []  2. Occasionally  []  3. Frequently  []  4. Almost constantly  []  8. Unable to answer    Over the past 5 days, how often have you limited your participation in rehabilitation therapy sessions due to pain?  [x]  0. Does not apply - I have not received rehabilitation therapy in the past 5 days  []  1. Rarely or not at all  []  2. Occasionally  []  3. Frequently  []  4. Almost constantly  []  8. Unable to answer    Pain Interference with Day-to-Day Activities:  Over the past 5 days, how often have you limited your day-to-day activities (excluding rehabilitation therapy session)?  [x]  1. Rarely or not at all  []  2. Occasionally  []  3. Frequently  []  4. Almost constantly  []  8. Unable to answer      High-Risk Drug Classes: Use and Indication   Is taking: Check if the pt is taking any medications by pharmacological classification  Indication noted: If column 1 is checked, check if there is an indication noted for all meds in the drug class Is taking  (check all that apply) Indication noted (check all that apply)   Antipsychotic [] []   Anticoagulant [] []   Antibiotic [] []   Opioid [] []   Antiplatelet [x] [x]   Hypoglycemic (including insulin) [] []   None of the above [] []     Special Treatments, Procedures, and Programs   Check all of the following treatments, procedures, and programs that apply on admission.  On admission (check all that apply)   Cancer Treatments    A1. Chemotherapy []   A2. IV []   A3. Oral []   A10. Other []   B1. Radiation []   Respiratory Therapies    C1. Oxygen Therapy []   C2.  Continuous []   C3. Intermittent []   C4. High-concentration []   D1. Suctioning []   D2. Scheduled []   D3. As needed []   E1. Tracheostomy Care []   F1. Invasive Mechanical Ventilator (ventilator or respirator) []   G1. Non-invasive Mechanical Ventilator []   G2. BiPAP []   G3. CPAP []   Other    H1. IV Medications []   H2. Vasoactive medications []   H3. Antibiotics []   H4. Anticoagulation []   H10. Other []   I1. Transfusions []   J1. Dialysis []   J2. Hemodialysis []   J3. Peritoneal dialysis []   O1. IV access []   O2. Peripheral [x]   O3. Midline []   O4. Central (PICC, tunneled, port) []   None of the above []     Signature:  Cornelius Gordon RN

## 2024-07-15 NOTE — PROGRESS NOTES
Occupational Therapy  Facility/Department: [facility]  Rehabilitation Occupational Therapy Evaluation       Date: 7/15/24  Patient Name: Sheyla Byrd       Room: 0154/0154-01  MRN: [id number]  Account: [id number]   : 1926  (89 y.o.) Gender: female     Referring Practitioner: Jenny Xie MD  Diagnosis: Bradycardia  Additional Pertinent Hx: HTN, Pacemaker placement 7/10/24, CHF    Restrictions  Restrictions/Precautions: Fall Risk;General Precautions;Surgical Protocols  Implants present? : Pacemaker  Sternal Precautions: on LUE only due to pacemaker placement on 7/10/24  Other position/activity restrictions: LUE IV, LUE pacemaker precautions  Required Braces or Orthoses?: No  Equipment Used: Bed;Wheelchair    Subjective  Subjective: Pt in bed, resting, no pain, confused initially but more clear once fully awake, agreeable to OT session,          Vitals  Temp: 97.9 °F (36.6 °C)  Pulse: 90  Respirations: 16  BP: 135/65  Oxygen Therapy  SpO2: 94 %  O2 Device: None (Room air)       Objective  Vision - Basic Assessment  Prior Vision: Wears glasses only for reading  Visual History: Other (add comment) (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  Visual Field Cut: No  Oculo Motor Control: WNL  Cognition  Overall Cognitive Status: Exceptions  Arousal/Alertness: Appropriate responses to stimuli  Following Commands: Inconsistently follows commands;Follows one step commands with repetition  Attention Span: Attends with cues to redirect  Memory: Decreased recall of precautions;Decreased short term memory;Impaired  Safety Judgement: Decreased awareness of need for assistance;Decreased awareness of need for safety;Impaired  Problem Solving: Assistance required to identify errors made;Assistance required to implement solutions;Decreased awareness of errors;Assistance required to generate solutions;Able to problem solve independently  Insights: Decreased awareness of

## 2024-07-15 NOTE — CARE COORDINATION
Case Management ARU Admission Assessment   [facility]    Patient:Sheyla Byrd     Rehab Dx/Hx: Bradycardia [R00.1]   :1926  MRN:[id number]  Date of Admit: 2024  Room #: 0154/0154-01       Objective:  met with the patient to complete initial assessment and review the role of Case Management while on the ARU. Patient educated on team conferences. Discussed family training with the patient/family on how it is encouraged on the unit. Order for \"discharge planning\" has been addressed.          Family Present: no   Primary : See below   Health Care Decision Maker:   Primary Decision Maker: Ruth Ann Cotto - Child - [phone]    Secondary Decision Maker: Hannah Byrd - Child - [phone]   Current PCP:  Humaira       Admit date:  2024   Insurance: [facility]   Precert required for SNF:  [x] No       [] Yes   3 Night stay required: [] No       [x] Yes   Admitting diagnosis: Bradycardia [R00.1]       Current home situation: W Dtr   DME at home: [x] Walker     [x] Cane       [] RTS        [] BSC      [] Shower Chair        [] O2       [] HHN     [] CPAP       [] BiPap      [] Hospital Bed       [] W/C        [] Other:    Medical concerns requiring training: Grady   Medication concerns:  Require financial assistance with meds? [x] No       [] If yes, please explain:   [] Yes              Services prior to admission: None   Preference for HHC or OP Therapy: [] Home health       [] Outpatient       [x] No preference   Patient's goal(s): I Need Therapy   Working or volunteering PTA? no       Transportation needs: Dtr   Has lack of transportation kept you from medical appointments, meetings, work, or ADL's? [] Yes, it has kept me from medical appointments or from getting my meds  [] Yes, It has kept me from non-medical meetings, appointments, work, or getting things I need  [x] No  [] Pt unable to respond  [] Pt declines to respond     How often do you need to have someone

## 2024-07-16 LAB
BACTERIA UR CULT: ABNORMAL
ORGANISM: ABNORMAL

## 2024-07-16 PROCEDURE — 97530 THERAPEUTIC ACTIVITIES: CPT

## 2024-07-16 PROCEDURE — 97535 SELF CARE MNGMENT TRAINING: CPT

## 2024-07-16 PROCEDURE — 6370000000 HC RX 637 (ALT 250 FOR IP): Performed by: STUDENT IN AN ORGANIZED HEALTH CARE EDUCATION/TRAINING PROGRAM

## 2024-07-16 PROCEDURE — 1280000000 HC REHAB R&B

## 2024-07-16 PROCEDURE — 97110 THERAPEUTIC EXERCISES: CPT

## 2024-07-16 PROCEDURE — 6360000002 HC RX W HCPCS: Performed by: STUDENT IN AN ORGANIZED HEALTH CARE EDUCATION/TRAINING PROGRAM

## 2024-07-16 PROCEDURE — 97116 GAIT TRAINING THERAPY: CPT

## 2024-07-16 PROCEDURE — 2580000003 HC RX 258: Performed by: STUDENT IN AN ORGANIZED HEALTH CARE EDUCATION/TRAINING PROGRAM

## 2024-07-16 PROCEDURE — 97129 THER IVNTJ 1ST 15 MIN: CPT

## 2024-07-16 PROCEDURE — 97130 THER IVNTJ EA ADDL 15 MIN: CPT

## 2024-07-16 RX ORDER — AMLODIPINE BESYLATE 5 MG/1
5 TABLET ORAL DAILY
Status: DISCONTINUED | OUTPATIENT
Start: 2024-07-17 | End: 2024-07-17

## 2024-07-16 RX ADMIN — ACETAMINOPHEN 650 MG: 325 TABLET ORAL at 20:27

## 2024-07-16 RX ADMIN — Medication 5 MG: at 20:28

## 2024-07-16 RX ADMIN — POLYETHYLENE GLYCOL 3350 17 G: 17 POWDER, FOR SOLUTION ORAL at 10:43

## 2024-07-16 RX ADMIN — CEFTRIAXONE SODIUM 1000 MG: 1 INJECTION, POWDER, FOR SOLUTION INTRAMUSCULAR; INTRAVENOUS at 18:30

## 2024-07-16 RX ADMIN — ENOXAPARIN SODIUM 30 MG: 100 INJECTION SUBCUTANEOUS at 10:43

## 2024-07-16 RX ADMIN — ATORVASTATIN CALCIUM 20 MG: 10 TABLET, FILM COATED ORAL at 10:43

## 2024-07-16 RX ADMIN — LEVOTHYROXINE SODIUM 125 MCG: 0.12 TABLET ORAL at 06:08

## 2024-07-16 RX ADMIN — ASPIRIN 81 MG: 81 TABLET, COATED ORAL at 10:43

## 2024-07-16 ASSESSMENT — PAIN SCALES - GENERAL
PAINLEVEL_OUTOF10: 3
PAINLEVEL_OUTOF10: 0
PAINLEVEL_OUTOF10: 0

## 2024-07-16 ASSESSMENT — PAIN DESCRIPTION - LOCATION: LOCATION: GENERALIZED

## 2024-07-16 NOTE — PROGRESS NOTES
[facility]: ACUTE REHAB UNIT  SPEECH-LANGUAGE PATHOLOGY      [x] Daily  [] Weekly Care Conference Note  [] Discharge    Patient:Sheyla Byrd      :1926  MRN:[id number]  Rehab Dx/Hx: Bradycardia [R00.1]    Precautions: N/A  Home situation: Pt lives with daughter, pt independently manages finances and medications; pt currently drives  ST Dx: [] Aphasia  [] Dysarthria  [] Apraxia   [] Oropharyngeal dysphagia [x] Cognitive Impairment  [] Other:   Date of Admit: 2024  Room #: 0154/0154-01    Current functional status (updated daily):         Pt being seen for : [] Speech/Language Treatment  [] Dysphagia Treatment [x] Cognitive Treatment  [] Other:  Communication: [x]WFL  [] Aphasia  [] Dysarthria  [] Apraxia  [] Pragmatic Impairment [] Non-verbal  [] Hearing Loss  [] Other:   Cognition: [] WFL  [x] Mild  [x] Moderate  [] Severe [] Unable to Assess  [] Other:  Memory: [] WFL  [x] Mild  [x] Moderate  [] Severe [] Unable to Assess  [] Other:  Behavior: [x] Alert  [x] Cooperative  []  Pleasant  [] Confused  [] Agitated  [] Uncooperative  [] Distractible [] Motivated  [] Self-Limiting [] Anxious  [] Other:  Endurance:  [x] Adequate for participation in SLP sessions  [] Reduced overall  [] Lethargic  [] Other:  Safety: [] No concerns at this time  [x] Reduced insight into deficits  [x]  Reduced safety awareness [] Not following call light procedures  [] Unable to Assess  [] Other:    Current Diet Order:ADULT DIET; Regular  ADULT ORAL NUTRITION SUPPLEMENT; Breakfast; Standard High Calorie/High Protein Oral Supplement  Swallowing Precautions: Sit up for all meals and thereafter for 30 minutes, Eat with small bites (1/2 tsp; 1 tsp), and Alternate solids with liquids        Date: 2024      Tx session 1  1330 - 1400 Tx session 2  All tx needs met in session 1   Total Timed Code Min 30    Total Treatment Minutes 30    Individual Treatment Minutes 30    Group Treatment Minutes 0 0   Co-Treat Minutes 0 0

## 2024-07-16 NOTE — PROGRESS NOTES
Occupational Therapy  Facility/Department: [facility]  Rehabilitation Occupational Therapy Daily Treatment Note    Date: 24  Patient Name: Sheyla Byrd       Room: 0154/0154-01  MRN: [id number]  Account: [id number]   : 1926  (89 y.o.) Gender: female                    Past Medical History:  has a past medical history of Amaurosis fugax of left eye, Benign neoplasm, Cancer of kidney (HCC), Colonic polyp, Cyst of ovary, left, Dehydration, Diverticulosis of colon, Dyslipidemia, GERD (gastroesophageal reflux disease), Glaucoma, Hyperlipidemia, Hypertension, IBS (irritable bowel syndrome), Osteoporosis, Other specified gastritis without mention of hemorrhage, and Pericardial effusion.  Past Surgical History:   has a past surgical history that includes Upper gastrointestinal endoscopy (1998); Colonoscopy (2009); Upper gastrointestinal endoscopy (09/10/1999); Colonoscopy (2001); Upper gastrointestinal endoscopy (2001); Upper gastrointestinal endoscopy (2004); Colonoscopy (2006); Upper gastrointestinal endoscopy (2007); partial nephrectomy (2010); Cholecystectomy, laparoscopic (1998); bronchoscopy (N/A, 2019); bronchoscopy (N/A, 2019); bronchoscopy (2019); and ep device procedure (N/A, 7/10/2024).    Restrictions  Restrictions/Precautions: Fall Risk, General Precautions, Surgical Protocols  Implants present? : Pacemaker  Sternal Precautions: on LUE only due to pacemaker placement on 7/10/24  Other position/activity restrictions: LUE IV, LUE pacemaker precautions, CHANTE hose  Required Braces or Orthoses  Left Upper Extremity Brace/Splint: Sling  Left Upper Extremity Brace/Splint: Sling/swathe for 24 hrs post-pacemaker placement on 7/10/24  Required Braces or Orthoses?: No  Equipment Used: Bed, Wheelchair    Subjective  Subjective: Pt in recliner, pleasant, no pain, agreeable to OT session, more aware of precautions this date, /60, HR  SBA  Stand to Sit  Assistance Level: Contact guard assist   OT Exercises  A/AROM Exercises: AAROM shoulder flexion on LUE for 15 reps to 90*  Resistive Exercises: 2# weight for 15 reps of elbow flexion, wrist extension, and supination/pronation     Assessment  Assessment  Assessment: First Session: Pt agreeable to OT session. Pt demonstrated improved strength and balance for sit<>stands with CGA/min A and mod VCs for pacemaker precautions during session. Pt demonstrated much improved strength for RUE to push for sit>stands while keeping LUE on lap. Pt performed mobility around gym to collect rings for 3 minutes and 6 minutes with mild dizziness noted. BP dropping from 135/64 to 108/69. MD made aware and CHANTE hose applied. Pt reported improved dizziness during last walk of ~3 minutes from gym>room.   Second Session: Pt completed core exercises on mat table with 2# medicine ball for 15 reps of obliques and a-p leaning with min VCs for technique. Pt performed 5x sit<> 27 seconds but required min A for sit>stands due to posterior lean and pt not fully standing up and falling back onto mat table at times. Pt performed mobility for 2-3 minutes x2 with seated rest break FDC to gym at beginning of session, but improved tolerance to walk distance to room at end of session with good obstacle avoidance and no LOB. Pt required CGA due to mild unsteadiness and min VCs for hand placement when sitting in recliner. Family present and assisted with cues for hand placement and adherence to pacemaker precautions. Continue POC.  Activity Tolerance: Patient limited by fatigue;Patient limited by endurance;Patient tolerated treatment well  Discharge Recommendations: 24 hour supervision or assist;Home with Home health OT;S Level 1  OT Equipment Recommendations  Equipment Needed: Yes  Mobility Devices: ADL Assistive Devices  ADL Assistive Devices: Transfer Tub Bench  Safety Devices  Safety Devices in place: Yes  Type of devices:

## 2024-07-16 NOTE — PROGRESS NOTES
Physical Therapy  Facility/Department: [facility]  Rehabilitation Physical Therapy Treatment Note    NAME: Sheyla Byrd  : 1926 (89 y.o.)  MRN: [id number]  CODE STATUS: Full Code    Date of Service: 24       Restrictions:  Restrictions/Precautions: Fall Risk, General Precautions, Surgical Protocols  Required Braces or Orthoses  Left Upper Extremity Brace/Splint: Sling  Left Upper Extremity Brace/Splint: Sling/swathe for 24 hrs post-pacemaker placement on 7/10/24  Position Activity Restriction  Sternal Precautions: No Pushing, No Pulling, 5# Lifting Restrictions  Sternal Precautions: on LUE only due to pacemaker placement on 7/10/24  Other position/activity restrictions: LUE IV, LUE pacemaker precautions     SUBJECTIVE  Subjective  Subjective: pt found in bed  Pain: reports chronic arthritic R shoulder pain            OBJECTIVE  Cognition  Overall Cognitive Status: Exceptions  Arousal/Alertness: Appropriate responses to stimuli  Following Commands: Inconsistently follows commands;Follows one step commands with repetition  Attention Span: Attends with cues to redirect  Memory: Decreased recall of precautions;Decreased short term memory;Impaired  Safety Judgement: Decreased awareness of need for assistance;Decreased awareness of need for safety;Impaired  Problem Solving: Assistance required to identify errors made;Assistance required to implement solutions;Decreased awareness of errors;Assistance required to generate solutions;Able to problem solve independently  Insights: Decreased awareness of deficits  Initiation: Requires cues for some  Sequencing: Requires cues for some  Orientation  Overall Orientation Status: Impaired  Orientation Level: Oriented to person;Oriented to place;Oriented to time;Disoriented to situation    Functional Mobility  Supine to Sit  Assistance Level: Moderate assistance  Skilled Clinical Factors: with HOB maximally elevated , cues for precautions  Balance  Sitting Balance:  with RW and wc follow for increased safety. due to impaired cog/memory rec home with 24/7 and HHPT. DME: TBD, probably none  Activity Tolerance: Patient limited by fatigue;Patient limited by endurance;Treatment limited secondary to medical complications  Discharge Recommendations: 24 hour supervision or assist;Home with Home health PT  PT Equipment Recommendations  Equipment Needed: Yes  Other: TBD    Goals  Patient Goals   Patient Goals : \"to go home\"  Short Term Goals  Time Frame for Short Term Goals: 9 days 7/22  Short Term Goal 1: pt will complete bed mobility with min A  Short Term Goal 2: pt will complete functional transfers with min A  Short Term Goal 3: pt will ambulate 15 ft with LRAD and min A  Long Term Goals  Time Frame for Long Term Goals : 14 days 7/27  Long Term Goal 1: pt will complete bed mobility with SBA  Long Term Goal 2: pt will complete functional transfer with CGA and LRAd  Long Term Goal 3: pt will ambulate 50 ft with LRAD and CGA  Long Term Goal 4: pt will  object from floor with LRAD and SBA  Long Term Goal 5: pt will complete car transfer with LRAD and CGA    PLAN OF CARE/SAFETY  Physical Therapy Plan  General Plan: 5-7 times per week  Current Treatment Recommendations: Strengthening;ROM;Balance training;Functional mobility training;Transfer training;Endurance training;Gait training;Home exercise program;Safety education & training;Patient/Caregiver education & training;Equipment evaluation, education, & procurement;Therapeutic activities  Safety Devices  Type of Devices: All fall risk precautions in place;Call light within reach;Gait belt;Patient at risk for falls;Nurse notified;All bushra prominences offloaded;Chair alarm in place;Left in chair    EDUCATION  Education  Education Given To: Patient  Education Provided: Role of Therapy;Mobility Training;Plan of Care;Precautions;Safety;ADL Function;DME/Home Modifications;Equipment;Transfer Training;Cognition  Education Provided

## 2024-07-16 NOTE — PATIENT CARE CONFERENCE
with 90% acc given min cues. - 07/16/2024 - progressing, ongoing    ST Assessment:  Pt alert and agreeable to participate in therapy. Noted that pt presents with hearing loss and benefits from repeated directions or tasks. During problem solving task, pt required mod cueing for identifying the problem and min cueing for providing a solution. Pt also increased acc given mod cueing for word list retention task. At this time, SLP recommends for pt to receive 24 hr supervision and ongoing speech at discharge. Continue to target above tx goals.     NUTRITION  Weight - Scale: 59.2 kg (130 lb 8 oz) / Body mass index is 25.49 kg/m².  Diet Order: ADULT DIET; Regular  ADULT ORAL NUTRITION SUPPLEMENT; Breakfast; Standard High Calorie/High Protein Oral Supplement  PO Meals Eaten (%): 76 - 100%  Malnutrition Status: At risk for malnutrition (Comment)  Nutrition Education/Counseling: No recommendation at this time      CASE MANAGEMENT  Assessment:   POC discussed with patient and daughter. Patient will return home with daughter at discharge.Referral made to [facility] for possible HHC needs the patient or daughter had no preference. Family training set for 7/23/24 at 10:00 Am.Patient has walker at home  Will follow for any other discharge needs      Interdisciplinary Goals:   1.) Pt will complete toileting with mod A.  2.) pt will complete functional transfer with mod A and LRAD  3.) Pt will carryover use of compensatory strategies for improved recall, safety, and insight across all disciplines given min cues    4.)  5.)      [x]  Family Training discussed at conference and to be scheduled.      Discharge Plan   Estimated discharge date: 7/29/2024  Destination: Home vs SNF pending available assistance  Pass:No  Services at Discharge: Home Health vs SNF  Physical Therapy, Occupational Therapy, Speech Therapy, and Nursing   Equipment at Discharge: Shower chair, RW vs 4WW if d/c home    Team Members Present at  Conference:  : Ade Diez BSN    Occupational Therapist: Barry Rosales, OTR/L  Physical Therapist: Yuridia Liz PT, DPT   Speech Therapist: Cheyenne Lehman MA CCC-SLP and Lorena Frost, ALVARO    Nurse: Lisbet Lacy RN  Dietician: Kathy Scott, RDN, LD  Scribe: Yoselin Rosen PT, DPT  : Huang Kay, OTR/L  Scribe: Yoselin Rosen PT, DPT  Psychiatry: N/A    Family members present at conference: No      I led this team conference and I approve the established interdisciplinary plan of care as documented within the medical record of Sheyla Haskins    MD: Electronically signed by Jenny Xie MD on 7/17/2024 at 3:57 PM

## 2024-07-16 NOTE — PROGRESS NOTES
precautions  Stand>sit:  Assistance Level: Maximum assistance, Moderate assistance  Bed<>chair     Stand Pivot:  Assistance Level: Maximum assistance  Skilled Clinical Factors: EOB to wc and wc to recliner with RW  Lateral transfer:     Car transfer:     Ambulation:  Surface: Level surface  Device: Rolling walker  Distance: 35 ft  Assistance Level: Dependent, Minimal assistance  Gait Deviations: Slow kunal, Decreased step length bilateral, Decreased weight shift bilateral, Decreased trunk rotation, Unsteady gait, Narrow base of support  Skilled Clinical Factors: wc follow, thus TD  Stairs:     Curb:     Wheelchair:       Occupational therapy:   Feeding     Grooming/Oral Hygiene     UE Bathing     LE Bathing     UE Dressing     LE Dressing     Putting On/Taking Off Footwear     Toileting       Speech therapy:    ADULT DIET; Regular  ADULT ORAL NUTRITION SUPPLEMENT; Breakfast; Standard High Calorie/High Protein Oral Supplement    Body mass index is 25.49 kg/m².    Assessment and Plan:  Sheyla Byrd is a 89 year old female with a past medical history significant for HTN, restrictive lung disease, CHF not on lasix, and hypothyroidism who [facility] with generalized weakness and bradycardia, now s/p pacemaker. She was admitted to [facility] on 7/14/24 due to functional deficits below her baseline.      Bradycardia with heart block  - s/p pacemaker placement 7/11  - follow up with EP outpatient  - PT, OT     Urinary Retention  - lagunas in place  - voiding trial once more mobile      UTI POA  - culture positive for Klebsiella pneumoniae   - Rocephin to complete course     HTN  - avoid beta-blockers and calcium channel blockers  - decrease amlodipine     HLD/CAD  - asa, atorvastatin     Hypothyroidism  - levothyroxine     Bowels: adjust medications as needed for regular bowel movements      Bladder: Check PVR x 3.  IMC if PVR > 200ml or if any volume is > 500 ml.      Sleep: melatonin qhs       PPX  DVT: lovenox   GI: not indicated     Follow up appointments: PCP, EP    Therapy progress: max assist for sit to stand    Jenny Xie MD 7/16/2024, 10:20 AM

## 2024-07-17 PROCEDURE — 97129 THER IVNTJ 1ST 15 MIN: CPT

## 2024-07-17 PROCEDURE — 97530 THERAPEUTIC ACTIVITIES: CPT

## 2024-07-17 PROCEDURE — 6370000000 HC RX 637 (ALT 250 FOR IP): Performed by: STUDENT IN AN ORGANIZED HEALTH CARE EDUCATION/TRAINING PROGRAM

## 2024-07-17 PROCEDURE — 2580000003 HC RX 258: Performed by: STUDENT IN AN ORGANIZED HEALTH CARE EDUCATION/TRAINING PROGRAM

## 2024-07-17 PROCEDURE — 1280000000 HC REHAB R&B

## 2024-07-17 PROCEDURE — 6360000002 HC RX W HCPCS: Performed by: STUDENT IN AN ORGANIZED HEALTH CARE EDUCATION/TRAINING PROGRAM

## 2024-07-17 PROCEDURE — 97110 THERAPEUTIC EXERCISES: CPT

## 2024-07-17 PROCEDURE — 97116 GAIT TRAINING THERAPY: CPT

## 2024-07-17 PROCEDURE — 97535 SELF CARE MNGMENT TRAINING: CPT

## 2024-07-17 PROCEDURE — 97130 THER IVNTJ EA ADDL 15 MIN: CPT

## 2024-07-17 RX ORDER — AMLODIPINE BESYLATE 2.5 MG/1
2.5 TABLET ORAL DAILY
Status: DISCONTINUED | OUTPATIENT
Start: 2024-07-18 | End: 2024-07-25

## 2024-07-17 RX ADMIN — CEFTRIAXONE SODIUM 1000 MG: 1 INJECTION, POWDER, FOR SOLUTION INTRAMUSCULAR; INTRAVENOUS at 17:52

## 2024-07-17 RX ADMIN — ATORVASTATIN CALCIUM 20 MG: 10 TABLET, FILM COATED ORAL at 08:18

## 2024-07-17 RX ADMIN — Medication 5 MG: at 19:49

## 2024-07-17 RX ADMIN — AMLODIPINE BESYLATE 5 MG: 5 TABLET ORAL at 08:18

## 2024-07-17 RX ADMIN — LEVOTHYROXINE SODIUM 125 MCG: 0.12 TABLET ORAL at 06:50

## 2024-07-17 RX ADMIN — POLYETHYLENE GLYCOL 3350 17 G: 17 POWDER, FOR SOLUTION ORAL at 13:36

## 2024-07-17 RX ADMIN — ENOXAPARIN SODIUM 30 MG: 100 INJECTION SUBCUTANEOUS at 08:18

## 2024-07-17 RX ADMIN — ASPIRIN 81 MG: 81 TABLET, COATED ORAL at 08:18

## 2024-07-17 ASSESSMENT — PAIN SCALES - GENERAL: PAINLEVEL_OUTOF10: 0

## 2024-07-17 NOTE — PROGRESS NOTES
[facility]: ACUTE REHAB UNIT  SPEECH-LANGUAGE PATHOLOGY      [x] Daily  [] Weekly Care Conference Note  [] Discharge    Patient:Sheyla Byrd      :1926  MRN:[id number]  Rehab Dx/Hx: Bradycardia [R00.1]    Precautions: N/A  Home situation: Pt lives with daughter, pt independently manages finances and medications; pt currently drives  ST Dx: [] Aphasia  [] Dysarthria  [] Apraxia   [] Oropharyngeal dysphagia [x] Cognitive Impairment  [] Other:   Date of Admit: 2024  Room #: 0154/0154-01    Current functional status (updated daily):         Pt being seen for : [] Speech/Language Treatment  [] Dysphagia Treatment [x] Cognitive Treatment  [] Other:  Communication: [x]WFL  [] Aphasia  [] Dysarthria  [] Apraxia  [] Pragmatic Impairment [] Non-verbal  [] Hearing Loss  [] Other:   Cognition: [] WFL  [x] Mild  [x] Moderate  [] Severe [] Unable to Assess  [] Other:  Memory: [] WFL  [x] Mild  [x] Moderate  [] Severe [] Unable to Assess  [] Other:  Behavior: [x] Alert  [x] Cooperative  []  Pleasant  [] Confused  [] Agitated  [] Uncooperative  [] Distractible [] Motivated  [] Self-Limiting [] Anxious  [] Other:  Endurance:  [x] Adequate for participation in SLP sessions  [] Reduced overall  [] Lethargic  [] Other:  Safety: [] No concerns at this time  [x] Reduced insight into deficits  [x]  Reduced safety awareness [] Not following call light procedures  [] Unable to Assess  [] Other:    Current Diet Order:ADULT DIET; Regular  ADULT ORAL NUTRITION SUPPLEMENT; Breakfast; Standard High Calorie/High Protein Oral Supplement  Swallowing Precautions: Sit up for all meals and thereafter for 30 minutes, Eat with small bites (1/2 tsp; 1 tsp), and Alternate solids with liquids        Date: 2024      Tx session 1  1430 - 1500 Tx session 2  All tx needs met in session 1   Total Timed Code Min 30    Total Treatment Minutes 30    Individual Treatment Minutes 30    Group Treatment Minutes 0 0   Co-Treat Minutes 0 0

## 2024-07-17 NOTE — PROGRESS NOTES
Occupational Therapy  Facility/Department: [facility]  Rehabilitation Occupational Therapy Daily Treatment Note    Date: 24  Patient Name: Sheyla Byrd       Room: 0154/0154-01  MRN: [id number]  Account: [id number]   : 1926  (89 y.o.) Gender: female                    Past Medical History:  has a past medical history of Amaurosis fugax of left eye, Benign neoplasm, Cancer of kidney (HCC), Colonic polyp, Cyst of ovary, left, Dehydration, Diverticulosis of colon, Dyslipidemia, GERD (gastroesophageal reflux disease), Glaucoma, Hyperlipidemia, Hypertension, IBS (irritable bowel syndrome), Osteoporosis, Other specified gastritis without mention of hemorrhage, and Pericardial effusion.  Past Surgical History:   has a past surgical history that includes Upper gastrointestinal endoscopy (1998); Colonoscopy (2009); Upper gastrointestinal endoscopy (09/10/1999); Colonoscopy (2001); Upper gastrointestinal endoscopy (2001); Upper gastrointestinal endoscopy (2004); Colonoscopy (2006); Upper gastrointestinal endoscopy (2007); partial nephrectomy (2010); Cholecystectomy, laparoscopic (1998); bronchoscopy (N/A, 2019); bronchoscopy (N/A, 2019); bronchoscopy (2019); and ep device procedure (N/A, 7/10/2024).    Restrictions  Restrictions/Precautions: Fall Risk, General Precautions, Surgical Protocols  Implants present? : Pacemaker  Sternal Precautions: on LUE only due to pacemaker placement on 7/10/24  Other position/activity restrictions: LUE IV, LUE pacemaker precautions, CHANTE hose  Required Braces or Orthoses  Left Upper Extremity Brace/Splint: Sling  Left Upper Extremity Brace/Splint: Sling/swathe for 24 hrs post-pacemaker placement on 7/10/24  Required Braces or Orthoses?: No  Equipment Used: Bed, Wheelchair    Subjective  Subjective: Pt in bed, no pain, agreeable to OT session, /67 in bed, orthostatic, HR 86, O2 sats 94% on  RA.  Restrictions/Precautions: Fall Risk;General Precautions;Surgical Protocols             Objective     Cognition  Overall Cognitive Status: Exceptions  Arousal/Alertness: Appropriate responses to stimuli  Following Commands: Follows one step commands with repetition  Attention Span: Appears intact  Memory: Decreased recall of precautions;Decreased short term memory;Impaired  Safety Judgement: Decreased awareness of need for assistance;Decreased awareness of need for safety;Impaired  Problem Solving: Assistance required to identify errors made;Assistance required to implement solutions;Decreased awareness of errors;Assistance required to generate solutions;Able to problem solve independently  Insights: Decreased awareness of deficits  Initiation: Requires cues for some  Sequencing: Requires cues for some  Orientation  Overall Orientation Status: Within Functional Limits         ADL  Grooming/Oral Hygiene  Assistance Level: Stand by assist  Skilled Clinical Factors: standing at sink for ~5 minutes for brushing teeth  Upper Extremity Dressing  Assistance Level: Minimal assistance  Skilled Clinical Factors: assist to spin bra around, SPV to don shirt  Lower Extremity Dressing  Assistance Level: Minimal assistance  Skilled Clinical Factors: assist to thread lagunas into pants, able to thread BLEs into pants and pull over hips with min A for balance  Putting On/Taking Off Footwear  Assistance Level: Dependent  Skilled Clinical Factors: to don B CHANTE hose and B shoes          Functional Mobility  Device: Rolling walker  Activity: To/From bathroom  Assistance Level: Contact guard assist  Skilled Clinical Factors: SBA/CGA to walk to/from bathroom and groom at sink for ~5 minutes  Bed Mobility  Overall Assistance Level: Minimal Assistance  Additional Factors: Set-up;Verbal cues;Increased time to complete;Head of bed raised  Supine to Sit  Assistance Level: Minimal assistance  Skilled Clinical Factors: min VCs for pacemaker

## 2024-07-17 NOTE — PROGRESS NOTES
Sheyla Byrd  7/17/2024  [id number]    Chief Complaint: Bradycardia    Subjective:   No acute events overnight. Today Sheyla is seen in the gym with therapy. She continues to have problems with orthostatic hypotension. Blood pressure is higher at times, but concerns about patient being low in therapies. She also reports right 1st toe pain. She is unsure if she has a history of gout.     Reviewed labs.     ROS: denies f/c, n/v, cp, sob    Objective:  Patient Vitals for the past 24 hrs:   BP Temp Temp src Pulse Resp SpO2 Weight   07/17/24 0944 112/70 -- -- 87 -- 92 % --   07/17/24 0919 (!) 108/59 -- -- 90 -- 93 % --   07/17/24 0900 123/70 -- -- 91 -- -- --   07/17/24 0845 (!) 116/57 -- -- 87 -- -- --   07/17/24 0835 (!) 148/67 -- -- 86 -- 94 % --   07/17/24 0815 (!) 146/72 98.3 °F (36.8 °C) Oral 85 16 92 % --   07/17/24 0630 -- -- -- -- -- -- 58.8 kg (129 lb 9.6 oz)   07/16/24 2021 (!) 167/80 97.8 °F (36.6 °C) Oral 94 16 92 % --     Gen: No distress, pleasant.   HEENT: Normocephalic, atraumatic.   CV: extremities well perfused   Resp: No respiratory distress. On room air   Abd: Soft, nondistended  Ext: No edema.   Neuro: Alert, oriented, appropriately interactive.   Psych: appropriate mood and affect    Wt Readings from Last 3 Encounters:   07/17/24 58.8 kg (129 lb 9.6 oz)   07/10/24 56 kg (123 lb 7.3 oz)   12/07/21 57.2 kg (126 lb)       Laboratory data:   Lab Results   Component Value Date    WBC 5.8 07/15/2024    HGB 10.3 (L) 07/15/2024    HCT 30.2 (L) 07/15/2024    MCV 92.3 07/15/2024     07/15/2024       Lab Results   Component Value Date/Time     07/15/2024 05:51 AM    K 4.0 07/15/2024 05:51 AM     07/15/2024 05:51 AM    CO2 25 07/15/2024 05:51 AM    BUN 37 07/15/2024 05:51 AM    CREATININE 1.2 07/15/2024 05:51 AM    GLUCOSE 106 07/15/2024 05:51 AM    CALCIUM 8.4 07/15/2024 05:51 AM        Therapy progress:  Physical therapy:  Bed Mobility:     Sit>supine:     Supine>sit:  Assistance  presented [facility] 7/[facility]/24 with generalized weakness and bradycardia, now s/p pacemaker. She was admitted to [facility] on 7/14/24 due to functional deficits below her baseline.      Bradycardia with heart block  - s/p pacemaker placement 7/11  - follow up with EP outpatient  - PT, OT     Urinary Retention  - lagunas in place  - voiding trial tomorrow      UTI POA  - culture positive for Klebsiella pneumoniae   - Rocephin to complete course     HTN  - avoid beta-blockers and calcium channel blockers  - decrease amlodipine to 2.5 mg daily     HLD/CAD  - asa, atorvastatin     Hypothyroidism  - levothyroxine     Bowels: adjust medications as needed for regular bowel movements      Bladder: Check PVR x 3.  IMC if PVR > 200ml or if any volume is > 500 ml.      Sleep: melatonin qhs      PPX  DVT: lovenox   GI: not indicated     Follow up appointments: PCP, EP    Therapy progress: demonstrated improved ROM and balance for dressing with min A for UB/LB dressing with assist to standing and pull pants over hips   Services:  PT, OT, ST, nursing; 24 hour assistance  EDOD: 7/29    Interdisciplinary team conference was held today with entire rehab treatment team including PT, OT, SLP (if applicable), Dietician, RN, and SW. Discussion focused on progress toward rehab goals and discharge planning. Making progress. Barriers include level of assistance, availability of assistance, endurance, comorbidities. We as a medical team, and I as the physician , made a plan to work on these barriers to facilitate safe discharge. Plan will be presented to patient/family (if available).     Jenny Xie MD 7/17/2024, 12:36 PM

## 2024-07-17 NOTE — PROGRESS NOTES
Physical Therapy  Facility/Department: [facility]  Rehabilitation Physical Therapy Treatment Note    NAME: Sheyla Byrd  : 1926 (89 y.o.)  MRN: [id number]  CODE STATUS: Full Code    Date of Service: 24       Restrictions:  Restrictions/Precautions: Fall Risk, General Precautions, Surgical Protocols  Required Braces or Orthoses  Left Upper Extremity Brace/Splint: Sling  Left Upper Extremity Brace/Splint: Sling/swathe for 24 hrs post-pacemaker placement on 7/10/24  Position Activity Restriction  Sternal Precautions: No Pushing, No Pulling, 5# Lifting Restrictions  Sternal Precautions: on LUE only due to pacemaker placement on 7/10/24  Other position/activity restrictions: LUE IV, LUE pacemaker precautions, CHANTE hose     SUBJECTIVE  Subjective  Subjective: pt found in w/c in gym  Pain: reports R big toe pain, RN notified 7/10. does not appear to limit standing tolerance       OBJECTIVE  Cognition  Overall Cognitive Status: Exceptions  Arousal/Alertness: Appropriate responses to stimuli  Following Commands: Follows one step commands with repetition  Attention Span: Appears intact  Memory: Decreased recall of precautions;Decreased short term memory;Impaired  Safety Judgement: Decreased awareness of need for assistance;Decreased awareness of need for safety;Impaired  Problem Solving: Assistance required to identify errors made;Assistance required to implement solutions;Decreased awareness of errors;Assistance required to generate solutions;Able to problem solve independently  Insights: Decreased awareness of deficits  Initiation: Requires cues for some  Sequencing: Requires cues for some  Orientation  Overall Orientation Status: Within Functional Limits  Orientation Level: Oriented to person;Oriented to place;Oriented to time;Oriented to situation    Functional Mobility  Sit to Stand  Assistance Level: Contact guard assist;Moderate assistance  Skilled Clinical Factors: progressing from mod A to CGA-min A

## 2024-07-17 NOTE — CARE COORDINATION
Weekly team care conference with interdisciplinary team on:  7/17/2024  ?   Chart reviewed for length of stay. IP day #  3 Unit:  [facility]  Diagnosis and current status as per MD progress:  Bradycardia  Consultants Following:  Cardiology; Dietician; PT/OT/SLP  Planned Discharge Date:  TBD  Durable medical equipment needed:   Shower john; Has walker at home; Family yo complete family training on 7/23/24  Discharge Plan:  Home with daughter referral place to [facility] to follow.  .Ade Diez RN

## 2024-07-18 LAB
ANION GAP SERPL CALCULATED.3IONS-SCNC: 10 MMOL/L (ref 3–16)
BASOPHILS # BLD: 0.1 K/UL (ref 0–0.2)
BASOPHILS NFR BLD: 1.3 %
BUN SERPL-MCNC: 34 MG/DL (ref 7–20)
CALCIUM SERPL-MCNC: 8.5 MG/DL (ref 8.3–10.6)
CHLORIDE SERPL-SCNC: 102 MMOL/L (ref 99–110)
CO2 SERPL-SCNC: 25 MMOL/L (ref 21–32)
CREAT SERPL-MCNC: 1.1 MG/DL (ref 0.6–1.2)
DEPRECATED RDW RBC AUTO: 14 % (ref 12.4–15.4)
EOSINOPHIL # BLD: 0.3 K/UL (ref 0–0.6)
EOSINOPHIL NFR BLD: 5.8 %
GFR SERPLBLD CREATININE-BSD FMLA CKD-EPI: 45 ML/MIN/{1.73_M2}
GLUCOSE SERPL-MCNC: 104 MG/DL (ref 70–99)
HCT VFR BLD AUTO: 29.9 % (ref 36–48)
HGB BLD-MCNC: 10 G/DL (ref 12–16)
LYMPHOCYTES # BLD: 0.9 K/UL (ref 1–5.1)
LYMPHOCYTES NFR BLD: 17.2 %
MCH RBC QN AUTO: 31.6 PG (ref 26–34)
MCHC RBC AUTO-ENTMCNC: 33.3 G/DL (ref 31–36)
MCV RBC AUTO: 94.8 FL (ref 80–100)
MONOCYTES # BLD: 0.5 K/UL (ref 0–1.3)
MONOCYTES NFR BLD: 10.2 %
NEUTROPHILS # BLD: 3.4 K/UL (ref 1.7–7.7)
NEUTROPHILS NFR BLD: 65.5 %
PLATELET # BLD AUTO: 209 K/UL (ref 135–450)
PMV BLD AUTO: 8.2 FL (ref 5–10.5)
POTASSIUM SERPL-SCNC: 5 MMOL/L (ref 3.5–5.1)
RBC # BLD AUTO: 3.16 M/UL (ref 4–5.2)
SODIUM SERPL-SCNC: 137 MMOL/L (ref 136–145)
WBC # BLD AUTO: 5.2 K/UL (ref 4–11)

## 2024-07-18 PROCEDURE — 6360000002 HC RX W HCPCS: Performed by: STUDENT IN AN ORGANIZED HEALTH CARE EDUCATION/TRAINING PROGRAM

## 2024-07-18 PROCEDURE — 51798 US URINE CAPACITY MEASURE: CPT

## 2024-07-18 PROCEDURE — 97530 THERAPEUTIC ACTIVITIES: CPT

## 2024-07-18 PROCEDURE — 2580000003 HC RX 258: Performed by: STUDENT IN AN ORGANIZED HEALTH CARE EDUCATION/TRAINING PROGRAM

## 2024-07-18 PROCEDURE — 85025 COMPLETE CBC W/AUTO DIFF WBC: CPT

## 2024-07-18 PROCEDURE — 97129 THER IVNTJ 1ST 15 MIN: CPT

## 2024-07-18 PROCEDURE — 97130 THER IVNTJ EA ADDL 15 MIN: CPT

## 2024-07-18 PROCEDURE — 1280000000 HC REHAB R&B

## 2024-07-18 PROCEDURE — 6370000000 HC RX 637 (ALT 250 FOR IP): Performed by: STUDENT IN AN ORGANIZED HEALTH CARE EDUCATION/TRAINING PROGRAM

## 2024-07-18 PROCEDURE — 36415 COLL VENOUS BLD VENIPUNCTURE: CPT

## 2024-07-18 PROCEDURE — 97110 THERAPEUTIC EXERCISES: CPT

## 2024-07-18 PROCEDURE — 80048 BASIC METABOLIC PNL TOTAL CA: CPT

## 2024-07-18 PROCEDURE — 97535 SELF CARE MNGMENT TRAINING: CPT

## 2024-07-18 PROCEDURE — 51702 INSERT TEMP BLADDER CATH: CPT

## 2024-07-18 RX ORDER — COLCHICINE 0.6 MG/1
0.6 TABLET ORAL ONCE
Status: COMPLETED | OUTPATIENT
Start: 2024-07-18 | End: 2024-07-18

## 2024-07-18 RX ORDER — SENNA AND DOCUSATE SODIUM 50; 8.6 MG/1; MG/1
2 TABLET, FILM COATED ORAL DAILY
Status: DISCONTINUED | OUTPATIENT
Start: 2024-07-18 | End: 2024-07-27 | Stop reason: HOSPADM

## 2024-07-18 RX ADMIN — Medication 5 MG: at 20:18

## 2024-07-18 RX ADMIN — ENOXAPARIN SODIUM 30 MG: 100 INJECTION SUBCUTANEOUS at 07:44

## 2024-07-18 RX ADMIN — COLCHICINE 0.6 MG: 0.6 TABLET ORAL at 11:05

## 2024-07-18 RX ADMIN — POLYETHYLENE GLYCOL 3350 17 G: 17 POWDER, FOR SOLUTION ORAL at 07:44

## 2024-07-18 RX ADMIN — ATORVASTATIN CALCIUM 20 MG: 10 TABLET, FILM COATED ORAL at 07:43

## 2024-07-18 RX ADMIN — LEVOTHYROXINE SODIUM 125 MCG: 0.12 TABLET ORAL at 06:27

## 2024-07-18 RX ADMIN — SENNOSIDES AND DOCUSATE SODIUM 2 TABLET: 50; 8.6 TABLET ORAL at 11:58

## 2024-07-18 RX ADMIN — CEFTRIAXONE SODIUM 1000 MG: 1 INJECTION, POWDER, FOR SOLUTION INTRAMUSCULAR; INTRAVENOUS at 17:18

## 2024-07-18 RX ADMIN — ASPIRIN 81 MG: 81 TABLET, COATED ORAL at 07:44

## 2024-07-18 RX ADMIN — BISACODYL 10 MG: 10 SUPPOSITORY RECTAL at 18:30

## 2024-07-18 RX ADMIN — AMLODIPINE BESYLATE 2.5 MG: 2.5 TABLET ORAL at 07:44

## 2024-07-18 ASSESSMENT — PAIN SCALES - GENERAL: PAINLEVEL_OUTOF10: 0

## 2024-07-18 NOTE — PROGRESS NOTES
Physical Therapy  Facility/Department: [facility]  Rehabilitation Physical Therapy Treatment Note    NAME: Sheyla Byrd  : 1926 (89 y.o.)  MRN: [id number]  CODE STATUS: Full Code    Date of Service: 24       Restrictions:  Restrictions/Precautions: Fall Risk, General Precautions, Surgical Protocols  Required Braces or Orthoses  Left Upper Extremity Brace/Splint: Sling  Left Upper Extremity Brace/Splint: Sling/swathe for 24 hrs post-pacemaker placement on 7/10/24  Position Activity Restriction  Sternal Precautions: No Pushing, No Pulling, 5# Lifting Restrictions  Sternal Precautions: on LUE only due to pacemaker placement on 7/10/24  Other position/activity restrictions: LUE IV, LUE pacemaker precautions, CHANTE hose     SUBJECTIVE  Subjective  Subjective: pt found in bed. pt reports continued \"dizziness\", nausea, \"blurry vision\" MD assessed pt and cleared pt for light seated activity  Pain: reports R big toe pain (red/swollen, MD notified)          OBJECTIVE  Cognition  Overall Cognitive Status: Exceptions  Arousal/Alertness: Appropriate responses to stimuli  Following Commands: Follows one step commands with repetition  Attention Span: Appears intact  Memory: Decreased recall of precautions;Decreased short term memory;Impaired  Safety Judgement: Decreased awareness of need for assistance;Decreased awareness of need for safety;Impaired  Problem Solving: Assistance required to identify errors made;Assistance required to implement solutions;Decreased awareness of errors;Assistance required to generate solutions;Able to problem solve independently  Insights: Decreased awareness of deficits  Initiation: Requires cues for some  Sequencing: Requires cues for some  Orientation  Overall Orientation Status: Within Functional Limits  Orientation Level: Oriented to person;Oriented to place;Oriented to time;Oriented to situation    Functional Mobility  Supine to Sit  Assistance Level: Minimal assistance  Skilled

## 2024-07-18 NOTE — PROGRESS NOTES
[facility]: ACUTE REHAB UNIT  SPEECH-LANGUAGE PATHOLOGY      [x] Daily  [] Weekly Care Conference Note  [] Discharge    Patient:Sheyla Byrd      :1926  MRN:[id number]  Rehab Dx/Hx: Bradycardia [R00.1]    Precautions: N/A  Home situation: Pt lives with daughter, pt independently manages finances and medications; pt currently drives  ST Dx: [] Aphasia  [] Dysarthria  [] Apraxia   [] Oropharyngeal dysphagia [x] Cognitive Impairment  [] Other:   Date of Admit: 2024  Room #: 0154/0154-01    Current functional status (updated daily):         Pt being seen for : [] Speech/Language Treatment  [] Dysphagia Treatment [x] Cognitive Treatment  [] Other:  Communication: [x]WFL  [] Aphasia  [] Dysarthria  [] Apraxia  [] Pragmatic Impairment [] Non-verbal  [] Hearing Loss  [] Other:   Cognition: [] WFL  [x] Mild  [x] Moderate  [] Severe [] Unable to Assess  [] Other:  Memory: [] WFL  [x] Mild  [x] Moderate  [] Severe [] Unable to Assess  [] Other:  Behavior: [x] Alert  [x] Cooperative  []  Pleasant  [] Confused  [] Agitated  [] Uncooperative  [] Distractible [] Motivated  [] Self-Limiting [] Anxious  [] Other:  Endurance:  [x] Adequate for participation in SLP sessions  [] Reduced overall  [] Lethargic  [] Other:  Safety: [] No concerns at this time  [x] Reduced insight into deficits  [x]  Reduced safety awareness [] Not following call light procedures  [] Unable to Assess  [] Other:    Current Diet Order:ADULT DIET; Regular  ADULT ORAL NUTRITION SUPPLEMENT; Breakfast; Standard High Calorie/High Protein Oral Supplement  Swallowing Precautions: Sit up for all meals and thereafter for 30 minutes, Eat with small bites (1/2 tsp; 1 tsp), and Alternate solids with liquids        Date: 2024      Tx session 1  [id number] Tx session 2  All tx needs met in session 1   Total Timed Code Min 30    Total Treatment Minutes 30    Individual Treatment Minutes 30    Group Treatment Minutes 0 0   Co-Treat Minutes 0 0  and to follow.    Plan: Continue as per plan of care.      Additional Information:     Barriers toward progress: Cognitive deficit, Hearing difficulties, Limited insight into deficits  Discharge recommendations:  [] Home independently  [] Home with assistance [x]  24 hour supervision  [] ECF [] Other:  Continued Tx Upon Discharge: ? [x] Yes [] No [] TBD based on progress while on ARU [] Vital Stim indicated [] Other:   Estimated discharge date: TBD    Interventions used this date:  [] Speech/Language Treatment  [] Instruction in HEP [] Group [] Dysphagia Treatment [x] Cognitive Treatment   [] Other:      Total Time Breakdown / Charges    Time in Time out Total Time / units   Cognitive Tx 0800 0830 30 min / 2 units   Speech Tx      Dysphagia Tx          Electronically Signed by     Christal Madrigal M.A. CCC-SLP   Speech-Language Pathologist

## 2024-07-18 NOTE — PLAN OF CARE
Problem: Discharge Planning  Goal: Discharge to home or other facility with appropriate resources  Outcome: Progressing     Problem: Safety - Adult  Goal: Free from fall injury  7/18/2024 1048 by Lisbet Lacy RN  Outcome: Progressing  7/17/2024 2305 by Jenn Haider, RN  Outcome: Progressing  Note: Pt. Free from falls or self injury at this time. Falls risk protocol maintained. Call light within reach.      Problem: Pain  Goal: Verbalizes/displays adequate comfort level or baseline comfort level  7/17/2024 2305 by eJnn Haider, RN  Outcome: Progressing  Note: Pain assessment completed. Nonpharmacological methods offered prior to and during times of pain. Medicated per orders as necessary.

## 2024-07-18 NOTE — PROGRESS NOTES
Sheyla Byrd  7/18/2024  [id number]    Chief Complaint: Bradycardia    Subjective:   No acute events overnight. Today Sheyla is seen with therapy. She reports nausea. She is constipated and unsure when her last bowel movement was. Starting stool softener. Therapy reporting that nursing plans to do a suppository this afternoon. She also reports large toe pain. Will give dose of colchicine to see if this helps.     Reviewed labs.     ROS: denies f/c, n/v, cp, sob    Objective:  Patient Vitals for the past 24 hrs:   BP Temp Temp src Pulse Resp SpO2 Weight   07/18/24 0907 (!) 143/65 -- -- 74 -- 92 % --   07/18/24 0730 (!) 152/76 98.6 °F (37 °C) Oral 89 16 91 % --   07/18/24 0419 -- -- -- -- -- -- 61.1 kg (134 lb 12.8 oz)   07/17/24 1930 (!) 152/67 98.6 °F (37 °C) Oral 86 16 93 % --     Gen: No distress, pleasant.   HEENT: Normocephalic, atraumatic.   CV: extremities well perfused   Resp: No respiratory distress. On room air   Abd: Soft, nondistended  Ext: No edema.   Neuro: Alert, oriented, appropriately interactive. Cranial nerves intact. Strength intact. Speech fluent  Psych: appropriate mood and affect    Wt Readings from Last 3 Encounters:   07/18/24 61.1 kg (134 lb 12.8 oz)   07/10/24 56 kg (123 lb 7.3 oz)   12/07/21 57.2 kg (126 lb)       Laboratory data:   Lab Results   Component Value Date    WBC 5.2 07/18/2024    HGB 10.0 (L) 07/18/2024    HCT 29.9 (L) 07/18/2024    MCV 94.8 07/18/2024     07/18/2024       Lab Results   Component Value Date/Time     07/18/2024 05:31 AM    K 5.0 07/18/2024 05:31 AM     07/18/2024 05:31 AM    CO2 25 07/18/2024 05:31 AM    BUN 34 07/18/2024 05:31 AM    CREATININE 1.1 07/18/2024 05:31 AM    GLUCOSE 104 07/18/2024 05:31 AM    CALCIUM 8.5 07/18/2024 05:31 AM        Therapy progress:  Physical therapy:  Bed Mobility:     Sit>supine:     Supine>sit:  Assistance Level: Minimal assistance  Skilled Clinical Factors: with HOB elevated, cues for

## 2024-07-18 NOTE — PROGRESS NOTES
Occupational Therapy  Facility/Department: [facility]  Rehabilitation Occupational Therapy Daily Treatment Note    Date: 24  Patient Name: Sheyla Byrd       Room: 0154/0154-01  MRN: [id number]  Account: [id number]   : 1926  (89 y.o.) Gender: female                    Past Medical History:  has a past medical history of Amaurosis fugax of left eye, Benign neoplasm, Cancer of kidney (HCC), Colonic polyp, Cyst of ovary, left, Dehydration, Diverticulosis of colon, Dyslipidemia, GERD (gastroesophageal reflux disease), Glaucoma, Hyperlipidemia, Hypertension, IBS (irritable bowel syndrome), Osteoporosis, Other specified gastritis without mention of hemorrhage, and Pericardial effusion.  Past Surgical History:   has a past surgical history that includes Upper gastrointestinal endoscopy (1998); Colonoscopy (2009); Upper gastrointestinal endoscopy (09/10/1999); Colonoscopy (2001); Upper gastrointestinal endoscopy (2001); Upper gastrointestinal endoscopy (2004); Colonoscopy (2006); Upper gastrointestinal endoscopy (2007); partial nephrectomy (2010); Cholecystectomy, laparoscopic (1998); bronchoscopy (N/A, 2019); bronchoscopy (N/A, 2019); bronchoscopy (2019); and ep device procedure (N/A, 7/10/2024).    Restrictions  Restrictions/Precautions: Fall Risk, General Precautions, Surgical Protocols  Implants present? : Pacemaker  Sternal Precautions: on LUE only due to pacemaker placement on 7/10/24  Other position/activity restrictions: LUE IV, LUE pacemaker precautions, CHANTE hose  Required Braces or Orthoses  Left Upper Extremity Brace/Splint: Sling  Left Upper Extremity Brace/Splint: Sling/swathe for 24 hrs post-pacemaker placement on 7/10/24  Required Braces or Orthoses?: No  Equipment Used: Bed, Wheelchair    Subjective  Subjective: Pt in recliner, pleasant, agreeable to OT session, no pain, /72, HR 89, O2 sats 93% on    Minutes 60           Timed Code Treatment Minutes:  60 Minutes    Total Treatment Minutes:  90 minutes      Barry Rosales, OT

## 2024-07-18 NOTE — PROGRESS NOTES
Pt's lagunas was removed at 0615 per orders to begin voiding trial today. Pt tolerated procedure without any c/o or complications. RN emptied lagunas with 850 ml clear yellow urine.

## 2024-07-19 LAB
ANION GAP SERPL CALCULATED.3IONS-SCNC: 11 MMOL/L (ref 3–16)
BUN SERPL-MCNC: 38 MG/DL (ref 7–20)
CALCIUM SERPL-MCNC: 8.8 MG/DL (ref 8.3–10.6)
CHLORIDE SERPL-SCNC: 98 MMOL/L (ref 99–110)
CO2 SERPL-SCNC: 23 MMOL/L (ref 21–32)
CREAT SERPL-MCNC: 1.2 MG/DL (ref 0.6–1.2)
GFR SERPLBLD CREATININE-BSD FMLA CKD-EPI: 41 ML/MIN/{1.73_M2}
GLUCOSE SERPL-MCNC: 144 MG/DL (ref 70–99)
POTASSIUM SERPL-SCNC: 4.4 MMOL/L (ref 3.5–5.1)
SODIUM SERPL-SCNC: 132 MMOL/L (ref 136–145)

## 2024-07-19 PROCEDURE — 97130 THER IVNTJ EA ADDL 15 MIN: CPT

## 2024-07-19 PROCEDURE — 6370000000 HC RX 637 (ALT 250 FOR IP): Performed by: STUDENT IN AN ORGANIZED HEALTH CARE EDUCATION/TRAINING PROGRAM

## 2024-07-19 PROCEDURE — 1280000000 HC REHAB R&B

## 2024-07-19 PROCEDURE — 97116 GAIT TRAINING THERAPY: CPT

## 2024-07-19 PROCEDURE — 97110 THERAPEUTIC EXERCISES: CPT

## 2024-07-19 PROCEDURE — 36415 COLL VENOUS BLD VENIPUNCTURE: CPT

## 2024-07-19 PROCEDURE — 97530 THERAPEUTIC ACTIVITIES: CPT

## 2024-07-19 PROCEDURE — 80048 BASIC METABOLIC PNL TOTAL CA: CPT

## 2024-07-19 PROCEDURE — 97129 THER IVNTJ 1ST 15 MIN: CPT

## 2024-07-19 PROCEDURE — 2580000003 HC RX 258: Performed by: STUDENT IN AN ORGANIZED HEALTH CARE EDUCATION/TRAINING PROGRAM

## 2024-07-19 PROCEDURE — 97535 SELF CARE MNGMENT TRAINING: CPT

## 2024-07-19 PROCEDURE — 6360000002 HC RX W HCPCS: Performed by: STUDENT IN AN ORGANIZED HEALTH CARE EDUCATION/TRAINING PROGRAM

## 2024-07-19 RX ORDER — COLCHICINE 0.6 MG/1
0.6 TABLET ORAL DAILY
Status: COMPLETED | OUTPATIENT
Start: 2024-07-19 | End: 2024-07-21

## 2024-07-19 RX ADMIN — ASPIRIN 81 MG: 81 TABLET, COATED ORAL at 08:07

## 2024-07-19 RX ADMIN — AMLODIPINE BESYLATE 2.5 MG: 2.5 TABLET ORAL at 08:07

## 2024-07-19 RX ADMIN — SENNOSIDES AND DOCUSATE SODIUM 2 TABLET: 50; 8.6 TABLET ORAL at 08:07

## 2024-07-19 RX ADMIN — Medication 5 MG: at 21:34

## 2024-07-19 RX ADMIN — COLCHICINE 0.6 MG: 0.6 TABLET ORAL at 11:44

## 2024-07-19 RX ADMIN — LEVOTHYROXINE SODIUM 125 MCG: 0.12 TABLET ORAL at 05:06

## 2024-07-19 RX ADMIN — ENOXAPARIN SODIUM 30 MG: 100 INJECTION SUBCUTANEOUS at 08:06

## 2024-07-19 RX ADMIN — CEFTRIAXONE SODIUM 1000 MG: 1 INJECTION, POWDER, FOR SOLUTION INTRAMUSCULAR; INTRAVENOUS at 17:43

## 2024-07-19 RX ADMIN — ATORVASTATIN CALCIUM 20 MG: 10 TABLET, FILM COATED ORAL at 08:07

## 2024-07-19 ASSESSMENT — PAIN SCALES - GENERAL
PAINLEVEL_OUTOF10: 0
PAINLEVEL_OUTOF10: 0

## 2024-07-19 NOTE — PROGRESS NOTES
Occupational Therapy  Facility/Department: [facility]  Rehabilitation Occupational Therapy Daily Treatment Note    Date: 24  Patient Name: Sheyla Byrd       Room: 0154/0154-01  MRN: [id number]  Account: [id number]   : 1926  (89 y.o.) Gender: female                    Past Medical History:  has a past medical history of Amaurosis fugax of left eye, Benign neoplasm, Cancer of kidney (HCC), Colonic polyp, Cyst of ovary, left, Dehydration, Diverticulosis of colon, Dyslipidemia, GERD (gastroesophageal reflux disease), Glaucoma, Hyperlipidemia, Hypertension, IBS (irritable bowel syndrome), Osteoporosis, Other specified gastritis without mention of hemorrhage, and Pericardial effusion.  Past Surgical History:   has a past surgical history that includes Upper gastrointestinal endoscopy (1998); Colonoscopy (2009); Upper gastrointestinal endoscopy (09/10/1999); Colonoscopy (2001); Upper gastrointestinal endoscopy (2001); Upper gastrointestinal endoscopy (2004); Colonoscopy (2006); Upper gastrointestinal endoscopy (2007); partial nephrectomy (2010); Cholecystectomy, laparoscopic (1998); bronchoscopy (N/A, 2019); bronchoscopy (N/A, 2019); bronchoscopy (2019); and ep device procedure (N/A, 7/10/2024).    Restrictions  Restrictions/Precautions: Fall Risk, General Precautions, Surgical Protocols  Implants present? : Pacemaker  Sternal Precautions: on LUE only due to pacemaker placement on 7/10/24  Other position/activity restrictions: LUE IV, LUE pacemaker precautions, CHANTE hose  Required Braces or Orthoses  Left Upper Extremity Brace/Splint: Sling  Left Upper Extremity Brace/Splint: Sling/swathe for 24 hrs post-pacemaker placement on 7/10/24  Required Braces or Orthoses?: No  Equipment Used: Bed, Wheelchair    Subjective  Subjective: Pt in bed, resting slow to arouse, no pain, agreeable to OT session with increased time, /79, HR 86, O2  dishes     Bed Mobility  Overall Assistance Level: Minimal Assistance  Additional Factors: Set-up;Verbal cues;Increased time to complete;Head of bed raised  Supine to Sit  Assistance Level: Minimal assistance  Skilled Clinical Factors: min VCs for pacemaker precautions  Scooting  Assistance Level: Minimal assistance  Transfers  Surface: To chair with arms;From bed  Additional Factors: Set-up;Verbal cues;Hand placement cues;Increased time to complete;With handrails  Device: Walker  Sit to Stand  Assistance Level: Contact guard assist  Stand to Sit  Assistance Level: Stand by assist  Bed To/From Chair  Technique: Stand pivot  Assistance Level: Contact guard assist         Assessment  Assessment  Assessment: First Session: Pt agreeable to OT session. Pt demonstrated improved ability to thread BLEs into pants and doff B socks this date. Pt required assist to manage shirt down fully in back and for threading catheter into pants. Pt completed transfer CGA and increased time. Pt able to eat breakfast with independence.   Second Session: Pt in recliner, feeling slightly light headed, /73 HR 83 on arrival. Pt completed toileting and toilet transfer with min A/CGA and grooming at sink for ~5 minutes with SPV. Pt performed BUE ther ex with 2# weight and good strength and adherence to pacemaker precautions. Pt performed IADL task to  with min A to correct 1 LOB and education to scoot items on counter as opposed to carrying item while holding onto walker. Pt demonstrated good standing tolerance to standing for 11:30 and 4 minutes in kitchen. Pt completed x10 sit<>stands with SBA. Continue POC.  Activity Tolerance: Patient limited by fatigue;Patient limited by endurance;Patient tolerated treatment well  Discharge Recommendations: 24 hour supervision or assist;Home with Home health OT;S Level 1  OT Equipment Recommendations  Equipment Needed: Yes  Mobility Devices: ADL Assistive Devices  ADL Assistive Devices:

## 2024-07-19 NOTE — CARE COORDINATION
CM update; LOS # 5; Patient POC includes Home with daughter. Discharge slated for 7/29/24. Patient will need Shower chair already has Rolling Walker. Will follow.Ade Diez RN

## 2024-07-19 NOTE — PROGRESS NOTES
[facility]     Electrophysiology                                     Progress Note    Admission date:  2024    Reason for follow up visit: Complete heart block    HPI/CC: Sheyla Byrd was admitted on  for with generalized weakness and fatigue.  EMS arrived and found her heart rate to be in the 20s with systolic blood pressure 80.  She was brought to the emergency room for evaluation and found to bili and complete heart block.  EP consulted.  Of note she was also noted to have a large pericardial effusion which was also noted 1 year ago.  She has a history of a LBBB dating back to .7/10 [employer] DC PPM implantation    Rhythm has been VPaced    Subjective: Patient denies chest pain, shortness with palpitations.  Daughter at bedside.  Left upper chest dressing removed.  No bleeding or hematoma noted.  Steri-Strips dry and intact.  Post care instructions reviewed with daughter and patient    Vitals:  Blood pressure (!) 159/66, pulse 73, temperature 98 °F (36.7 °C), temperature source Oral, resp. rate 13, height 1.524 m (5'), weight 56 kg (123 lb 7.3 oz), SpO2 98 %.  Temp  Av.3 °F (36.8 °C)  Min: 98 °F (36.7 °C)  Max: 98.5 °F (36.9 °C)  Pulse  Av.8  Min: 27  Max: 92  BP  Min: 109/85  Max: 172/72  SpO2  Av.8 %  Min: 92 %  Max: 98 %    24 hour I/O    Intake/Output Summary (Last 24 hours) at 7/10/2024 1359  Last data filed at 7/10/2024 1201  Gross per 24 hour   Intake 900 ml   Output 410 ml   Net 490 ml     Current Facility-Administered Medications   Medication Dose Route Frequency Provider Last Rate Last Admin    mupirocin (BACTROBAN) 2 % ointment   Each Nostril BID Sachin Hu MD   Given at 07/10/24 1247    naloxone 0.4 mg in 10 mL sodium chloride syringe   IntraVENous PRN Josef Cook MD        sodium chloride flush 0.9 % injection 5-40 mL  5-40 mL IntraVENous 2 times per day Josef Cook MD        sodium chloride flush 0.9 % injection 5-40 mL  5-40 mL 
    V2.[place]    [facility] [place]      Name:  Sheyla Byrd /Age/Sex: 1926  (89 y.o. female)   MRN & CSN:  [id number] & [id number] Encounter Date/Time: 2024 12:02 PM EDT   [facility]  [facility] PCP: Cornelius Gerardo MD     Attending:Chris Fong MD       Hospital Day: 3    Assessment and Recommendations   Sheyla Byrd is a 89 y.o. female with pmh of hypertension, history of lung disease, chronic diastolic heart failure, hypothyroidism who presents with Arrhythmia      Plan:     Bradycardia transient heart block  TSH within normal limits  Pacemaker placed yesterday  X-ray showing pacemaker in correct position  Cardiology following  EP signed out  Continue telemetry    Delirium  secondary to hospital stay,  and anesthesia  Patient required Geodon and Zyprexa overnight   Patient was alert and oriented x 3 at time of my evaluation  Will give melatonin before 9 PM daily  Continue to monitor    Hypertension  Avoiding AV florida blocking agents at this time  Patient was on atenolol 25 mg and is held  Continue amlodipine    Hyperlipidemia/coronary artery disease  Continue atorvastatin 20 mg  Continue aspirin 81 mg    Hypothyroidism  Continue levothyroxine 125 mcg  TSH within normal limits    Diet ADULT DIET; Regular; Low Fat/Low Chol/High Fiber/2 gm Na   DVT Prophylaxis [] Lovenox, []  Heparin, [] SCDs, [] Ambulation,  [] Eliquis, [] Xarelto  [] Coumadin   Code Status Full Code   Disposition From: Home  Expected Disposition: TBD  Estimated Date of Discharge: 2 to 3 days  Patient requires continued admission due to pacemaker placement   Surrogate Decision Maker/ Ruth Ann Perez     Personally reviewed Lab Studies and Imaging     Subjective:     Chief Complaint: Weakness and bradycardia    Sheyla Byrd is a 89 y.o. female who presents with hypertension, restrictive lung disease, CHF not on Lasix, hypothyroidism, presented with generalized weakness and bradycardia.  EMS arrived and 
    V2.0    [facility] [place]      Name:  Sheyla Byrd /Age/Sex: 1926  (89 y.o. female)   MRN & CSN:  [id number] & [id number] Encounter Date/Time: 2024 12:02 PM EDT   Location:  0355/0355-02 PCP: Cornelius Gerardo MD     Attending:Chris Fong MD       Hospital Day: 5    Assessment and Recommendations   Sheyla Byrd is a 89 y.o. female with pmh of hypertension, history of lung disease, chronic diastolic heart failure, hypothyroidism who presents with Arrhythmia      Plan:     Bradycardia transient heart block  TSH within normal limits  Pacemaker placed yesterday  X-ray showing pacemaker in correct position  Cardiology following  EP signed out  Continue telemetry  ARU consult placed  Discharge to ARU , 2024    Acute urinary retention  Possible secondary to postoperative urinary retention  Will trend creatinine today at 1.3  1 L was obtained from the urinary catheter  Voiding trial today    Delirium  secondary to hospital stay,  and anesthesia  Will give melatonin before 9 PM daily  Resolved alert  X 3  Continue to monitor    Hypertension  Avoiding AV florida blocking agents at this time  Continue amlodipine    Hyperlipidemia/coronary artery disease  Continue atorvastatin 20 mg  Continue aspirin 81 mg    Hypothyroidism  Continue levothyroxine 125 mcg  TSH within normal limits    Diet ADULT DIET; Regular; Low Fat/Low Chol/High Fiber/2 gm Na   DVT Prophylaxis [x] Lovenox, []  Heparin, [] SCDs, [] Ambulation,  [] Eliquis, [] Xarelto  [] Coumadin   Code Status Full Code   Disposition From: Home  Expected Disposition: TBD  Estimated Date of Discharge: 2 to 3 days  Patient requires continued admission due to pacemaker placement   Surrogate Decision Maker/ Ruth Ann Perez     Personally reviewed Lab Studies and Imaging     Subjective:     Chief Complaint: Weakness and bradycardia    Sheyla Byrd is a 89 y.o. female who presents with hypertension, restrictive lung disease, 
    V2.0    [facility] [place] Progress Note      Name:  Sheyla Byrd /Age/Sex: 1926  (89 y.o. female)   MRN & CSN:  [id number] & [id number] Encounter Date/Time: 2024 12:02 PM EDT   Location:  0355/0355-02 PCP: Cornelius Gerardo MD     Attending:Chris Fong MD       Hospital Day: 4    Assessment and Recommendations   Sheyla Byrd is a 89 y.o. female with pmh of hypertension, history of lung disease, chronic diastolic heart failure, hypothyroidism who presents with Arrhythmia      Plan:     Bradycardia transient heart block  TSH within normal limits  Pacemaker placed yesterday  X-ray showing pacemaker in correct position  Cardiology following  EP signed out  Continue telemetry  ARU consult placed  Discharge pending ARU    Acute urinary retention  Possible secondary to postoperative urinary retention  Will trend creatinine today at 1.3  1 L was obtained from the urinary catheter  Plan for voiding trial tomorrow    Delirium  secondary to hospital stay,  and anesthesia  Will give melatonin before 9 PM daily  Patient examined this morning only oriented to self  Continue to monitor    Hypertension  Avoiding AV florida blocking agents at this time  Continue amlodipine    Hyperlipidemia/coronary artery disease  Continue atorvastatin 20 mg  Continue aspirin 81 mg    Hypothyroidism  Continue levothyroxine 125 mcg  TSH within normal limits    Diet ADULT DIET; Regular; Low Fat/Low Chol/High Fiber/2 gm Na   DVT Prophylaxis [] Lovenox, []  Heparin, [] SCDs, [] Ambulation,  [] Eliquis, [] Xarelto  [] Coumadin   Code Status Full Code   Disposition From: Home  Expected Disposition: TBD  Estimated Date of Discharge: 2 to 3 days  Patient requires continued admission due to pacemaker placement   Surrogate Decision Maker/ Ruth Ann Perez     Personally reviewed Lab Studies and Imaging     Subjective:     Chief Complaint: Weakness and bradycardia    Sheyla Byrd is a 89 y.o. female who presents with 
  Physician Progress Note      PATIENT:               JENARO JOHNSON  CSN #:                  [id number]  :                       1926  ADMIT DATE:       2024 1:18 PM  DISCH DATE:        2024 6:02 PM  RESPONDING  PROVIDER #:        PAULA KEMP          QUERY TEXT:    Internal Medicine,    Pt admitted with bradycardia, transient complete heart block and has   confusion/delirium documented. Noted documentation of confusion related to   anesthesia. If possible, please document in the progress notes and discharge   summary if the delirium can be further specified as any of the following:    The medical record reflects the following:  Risk Factors:  anesthesia, bradycardia  Clinical Indicators: Per documentation: Delirium secondary to hospital stay,   sun downing and anesthesia. Nursing documents confusion post procedure  Treatment: melatonin, medical management and supportive care    Thank you,  Noelle Boggs RN [facility]  [phone]  Options provided:  -- Drug-induced encephalopathy due to anesthesia  -- Other encephalopathy, please document encephalopathy type  -- Other - I will add my own diagnosis  -- Disagree - Not applicable / Not valid  -- Disagree - Clinically unable to determine / Unknown  -- Refer to Clinical Documentation Reviewer    PROVIDER RESPONSE TEXT:    This patient has drug-induced encephalopathy due to anesthesia    Query created by: Noelle Boggs on 2024 10:53 AM      Electronically signed by:  PAULA KEMP 2024 4:50 PM          
Handoff given to Cath lab RN. Pt to cath lab at this time. VSS on transfer    
Occupational Therapy  Facility/Department: [facility] C2 CARD TELEMETRY  Occupational Therapy Initial Assessment    Name: Sheyla Byrd  : 1926  MRN: [id number]  Date of Service: 7/10/2024    Discharge Recommendations:  Continue to assess pending progress  OT Equipment Recommendations  Equipment Needed: Yes  Mobility Devices: ADL Assistive Devices  ADL Assistive Devices: Transfer Tub Bench       Patient Diagnosis(es): The primary encounter diagnosis was Bradycardia. Diagnoses of Other fatigue, Chronic pericardial effusion, Diarrhea, unspecified type, Chronic kidney disease, unspecified CKD stage, Pericardial effusion, and Arrhythmia were also pertinent to this visit.  Past Medical History:  has a past medical history of Amaurosis fugax of left eye, Benign neoplasm, Cancer of kidney (HCC), Colonic polyp, Cyst of ovary, left, Dehydration, Diverticulosis of colon, Dyslipidemia, GERD (gastroesophageal reflux disease), Glaucoma, Hyperlipidemia, Hypertension, IBS (irritable bowel syndrome), Osteoporosis, Other specified gastritis without mention of hemorrhage, and Pericardial effusion.  Past Surgical History:  has a past surgical history that includes Upper gastrointestinal endoscopy (1998); Colonoscopy (2009); Upper gastrointestinal endoscopy (09/10/1999); Colonoscopy (2001); Upper gastrointestinal endoscopy (2001); Upper gastrointestinal endoscopy (2004); Colonoscopy (2006); Upper gastrointestinal endoscopy (2007); partial nephrectomy (2010); Cholecystectomy, laparoscopic (1998); bronchoscopy (N/A, 2019); bronchoscopy (N/A, 2019); and bronchoscopy (2019).         Therapy discharge recommendations are subject to collaboration from the patient’s interdisciplinary healthcare team, including MD and case management recommendations.    Barriers to Home Discharge:   [] Steps to access home entry or bed/bath:   [x] Unable to transfer, ambulate, or propel 
Occupational Therapy  Facility/Department: [facility] B3 - MED SURG  Treatment Note    Name: Sheyla Byrd  : 1926  MRN: [id number]  Date of Service: 2024    Discharge Recommendations:  IP Rehab  Therapy discharge recommendations take into account each patient's current medical complexities and are subject to input/oversight from the patient's healthcare team.   Barriers to Home Discharge:     [x] Unable to transfer, ambulate, or propel wheelchair household distances without assist   [x] Limited available assist at home upon discharge    [x] Patient or family requests d/c to post-acute facility       If pt is unable to be seen after this session, please let this note serve as discharge summary.  Please see case management note for discharge disposition.  Thank you.           Patient Diagnosis(es): The primary encounter diagnosis was Bradycardia. Diagnoses of Other fatigue, Chronic pericardial effusion, Diarrhea, unspecified type, Chronic kidney disease, unspecified CKD stage, Pericardial effusion, and Arrhythmia were also pertinent to this visit.  Past Medical History:  has a past medical history of Amaurosis fugax of left eye, Benign neoplasm, Cancer of kidney (HCC), Colonic polyp, Cyst of ovary, left, Dehydration, Diverticulosis of colon, Dyslipidemia, GERD (gastroesophageal reflux disease), Glaucoma, Hyperlipidemia, Hypertension, IBS (irritable bowel syndrome), Osteoporosis, Other specified gastritis without mention of hemorrhage, and Pericardial effusion.  Past Surgical History:  has a past surgical history that includes Upper gastrointestinal endoscopy (1998); Colonoscopy (2009); Upper gastrointestinal endoscopy (09/10/1999); Colonoscopy (2001); Upper gastrointestinal endoscopy (2001); Upper gastrointestinal endoscopy (2004); Colonoscopy (2006); Upper gastrointestinal endoscopy (2007); partial nephrectomy (2010); Cholecystectomy, laparoscopic 
Occupational Therapy  Facility/Department: [facility] B3 - MED SURG  Daily Treatment Note  NAME: Sheyla Byrd  : 1926  MRN: [id number]    Date of Service: 2024    Discharge Recommendations:  IP Rehab       Therapy discharge recommendations are subject to collaboration from the patient’s interdisciplinary healthcare team, including MD and case management recommendations.  Barriers to Home Discharge:   [] Steps to access home entry or bed/bath   [x] Unable to transfer, ambulate, or propel wheelchair household distances without assist   [x] Limited available assist at home upon discharge    [] Patient or family requests d/c to post-acute facility    [] Poor cognition/safety awareness for d/c to home alone    [] Unable to maintain ordered weight bearing status    [] Patient with salient signs of long-standing immobility   [x] Decreased independence with ADLs   [x] Increased risk for falls   [] Other:    Patient Diagnosis(es): The primary encounter diagnosis was Bradycardia. Diagnoses of Other fatigue, Chronic pericardial effusion, Diarrhea, unspecified type, Chronic kidney disease, unspecified CKD stage, Pericardial effusion, and Arrhythmia were also pertinent to this visit.     Assessment    Assessment: Pt tolerated OT treatment well overall, very motivated for OOB activity. Co-tx collaboration this date with PT staff to safely progress pt toward goals. Pt will have better occupational performance outcomes within a co-treatment than 1:1 session. Pt initially min-mod A of 2 for transfers, progressing to CGA-min A of 2. Pt completed grooming tasks seated in chair with SBA. Pt with initial posterior lean in standing bracing against bed, improved as session progressed. Pt functioning below her baseline, demos ability to tolerate 3 hrs therapy per day, recommend IPR at d/c.   Activity Tolerance: Patient limited by fatigue;Patient limited by endurance  Discharge Recommendations: IP Rehab      Plan   Occupational 
Patient had one wet brief at beginning of night shift. Bladder scanned patient, retaining 999+. Encouraged patient to try and pee, patient stated they did not have to pee. Three nurses attempted to straight cath three different times. Unsuccessful due to patient anatomy. Notified overnight GERRY Hoffman. Order for lagunas. C3 nurses placed lagunas. 1250ml out from lagunas  
Per Dr. Fong, leave lagunas in today, voiding trial tomorrow 7/13/24  
Per pt chart, pt never a smoker. No smoking cessation education given.  
Pt has not voided since lagunas removed by day shift RN. Bladder scan 445mL. Straight cath voided 525mL. Post void bladder scan 0mL  
Pt increasingly confused since arrival back to room from cath lab. Pt oriented to person and year.   Pt also restless and repeatedly undoing her left arm sling and not adhering to instructions to not move the LUE.   Pt's daughter Ruth Ann, expressed concerned for pt's increased confusion, as pt has had no history of this. RN verbalized that pt's confusion was more than likely to be related to the sedation used intra op, and pt had not slept the night prior.     Pt also has had x3 loose Bms, brown in color. Not liquid in nature.      notified of the above. CT head order and imodium ordered.   Virtual  also ordered.   
Pt is increasingly restless, agitated, pulling off wires, noncompliant with LUE restrictions, and attempting to get out of bed. Writer has edu pt, but pt is nonaccepting and confused x3.   Call made to daughter Ruth Ann, updated with the above. Daughter spoke with pt per pt request.   Secure message made to Dr. Sweeeny/hilton lisa in regards to the above.   
Sheyla Byrd  7/14/2024  [id number]    Chief Complaint: Arrhythmia    Subjective:   This is a 89-year-old female with a past medical history including:  Past Medical History:   Diagnosis Date    Amaurosis fugax of left eye 3/15/2011    Benign neoplasm     colon    Cancer of kidney (HCC) 11/03/2010    Right    Colonic polyp 1999    Cyst of ovary, left 9/2/2012    Dehydration 2/9/2011    Diverticulosis of colon 1999    Dyslipidemia 2008    GERD (gastroesophageal reflux disease)     Glaucoma     Hyperlipidemia     Hypertension 1960    IBS (irritable bowel syndrome)     Osteoporosis     Other specified gastritis without mention of hemorrhage 1998    Pericardial effusion 8/23/2011     Who came into the hospital with cardiac arrhythmia.  She was found to have bradycardia transient heart plaque, acute urinary retention, delirium, hypertension, and hypothyroidism.  Previous to this she was mod independent.  Currently she is limited in therapy and likely will need intensive therapy before planned discharge home.  She is agreeable to come to the rehab unit when medically stable.      Interval history: Plan to admit to ARU today  ROS: No CP, SOB, dyspnea    Objective:  Patient Vitals for the past 24 hrs:   BP Temp Temp src Pulse Resp SpO2   07/14/24 1102 123/69 98.7 °F (37.1 °C) Oral 82 18 94 %   07/14/24 0749 (!) 146/72 98.6 °F (37 °C) Oral 87 16 91 %   07/14/24 0600 (!) 157/73 99.5 °F (37.5 °C) Oral 89 16 90 %   07/13/24 2339 (!) 152/75 98.6 °F (37 °C) -- 79 -- 91 %   07/13/24 2115 (!) 146/68 98.6 °F (37 °C) Oral 78 16 92 %   07/13/24 1500 (!) 150/71 99.3 °F (37.4 °C) Oral 79 18 91 %       Gen: No distress, pleasant.   HEENT: Normocephalic, atraumatic.   CV: No audible murmurs, well perfused extremities  Resp: No respiratory distress. No increased WOB  Abd: Soft, nontender nondistended  Ext: No edema.   Neuro: Alert,  appropriately interactive.     Laboratory data: Available via EMR.     Therapy 
Shift: 1680-0322    Admitting diagnosis: transiet CHB, LBBB, Chronic large pleural effusion    Presentation to hospital: Lethargic + fatigue     Surgery: no     Nursing assessment at handoff  stable    Emergency Contact/POA:Ruth Ann (Daughter)  Family updated: yes     Most recent vitals: BP (!) 150/54   Pulse 75   Temp 98.4 °F (36.9 °C) (Oral)   Resp 16   Ht 1.524 m (5')   Wt 56 kg (123 lb 7.3 oz)   SpO2 97%   BMI 24.11 kg/m²      Rhythm: Normal Sinus Rhythm      NC/HFNC- 2 lpm  Respiratory support: - No ventilator support    Vent days: n/a    Increased O2 requirements: no    Admission weight Weight - Scale: 56.2 kg (124 lb)  Today's weight   Wt Readings from Last 1 Encounters:   07/10/24 56 kg (123 lb 7.3 oz)         UOP >30ml/hr: yes      Cunningham need assessed each shift: no    Restraints: no  Order current and documentation up to date?    Lines/Drains  LDA Insertion Date Discontinued Date Dressing Changes   PIV  7/9/24 7/9/24     TLC       Arterial       Cunningham       Vas Cath      ETT       Surgical drains        Night Shift Hospitalist Interventions    Problem(Brief) Date Time Intervention Physician contacted                                               Drip rates at handoff:    isoproterenol (ISUPREL) 1 mg in sodium chloride 0.9 % 250 mL infusion      sodium chloride         Hospital Course Daily Updates:  Admit Day# 0 7/09/24 Nights  - Vital signs stable overnight  - No complains of pain made  - BM x 2  - Scheduled for pacemaker today  - Daughter Ruth Ann updated regarding patient condition    Lab Data:   CBC:   Recent Labs     07/09/24  1336 07/10/24  0441   WBC 5.6 6.8   HGB 11.8* 12.2   HCT 35.0* 36.1   MCV 93.2 92.8    188     BMP:    Recent Labs     07/09/24  1336 07/10/24  0441   * 135*   K 4.8 4.0   CO2 19* 24   BUN 41* 32*   CREATININE 1.4* 1.1     LIVR:   Recent Labs     07/09/24  1336   AST 20   ALT 11     PT/INR:   Recent Labs     07/09/24  1336   INR 1.01     APTT: No results for 
post-pacemaker placement on 7/10/24  Position Activity Restriction  Other position/activity restrictions: High fall risk per nursing assessment, telemetry, MARCELLO lagunas pacemaker precautions     Subjective    Subjective  Subjective: pt in bed, RN cleared  Pain: Pt denies pain.  Orientation  Overall Orientation Status: Within Functional Limits  Orientation Level: Oriented to person;Oriented to place;Oriented to situation;Oriented to time  Cognition  Overall Cognitive Status: Exceptions  Arousal/Alertness: Delayed responses to stimuli (likely due to hearing)  Following Commands: Follows one step commands with increased time;Follows one step commands with repetition  Attention Span: Attends with cues to redirect  Memory: Decreased recall of precautions  Safety Judgement: Decreased awareness of need for assistance  Problem Solving: Assistance required to implement solutions;Assistance required to generate solutions  Insights: Decreased awareness of deficits  Initiation: Requires cues for some  Sequencing: Requires cues for some     Objective   Vitals  Pulse: 81  Heart Rate Source: Monitor  BP: 137/69  BP Location: Right upper arm  BP Method: Automatic  Patient Position: Semi fowlers  MAP (Calculated): 92  SpO2: 94 %  O2 Device: None (Room air)  Bed Mobility Training  Bed Mobility Training: Yes  Interventions: Safety awareness training;Verbal cues  Supine to Sit: Stand-by assistance;Additional time;Adaptive equipment (HOB elevated with bed rail used)  Scooting: Stand-by assistance (forward in chair)  Balance  Sitting: Intact  Standing: Impaired  Standing - Static: Poor;Constant support  Standing - Dynamic: Poor;Constant support  Transfer Training  Transfer Training: Yes  Interventions: Safety awareness training;Verbal cues;Tactile cues;Visual cues  Sit to Stand: Moderate assistance;Minimum assistance;Assist X2;Adaptive equipment (MIN A+ MOD A, to rollator (4x during session))  Stand to Sit: Minimum assistance;Moderate 
Training  Interventions: Safety awareness training;Verbal cues;Tactile cues;Manual cues  Rolling: Maximum assistance (supine<>R sidelying)  Supine to Sit: Maximum assistance  Sit to Supine: Maximum assistance  Scooting: Maximum assistance;Assist X2 (maxA x 1 to scoot forward to EOB, maxA x 2 to scoot up in bed)    Balance  Sitting: Impaired  Sitting - Static: Fair (occasional)  Sitting - Dynamic: Fair (occasional)  Standing: Impaired  Standing - Static: Poor;Constant support  Standing - Dynamic: Poor;Constant support    Transfer Training  Interventions: Safety awareness training;Verbal cues;Visual cues;Tactile cues  Sit to Stand: Maximum assistance (from EOB to standing, pt only able to stand ~75% upright due to weakness/confusion)  Stand to Sit: Maximum assistance (from standing to EOB)  Bed to Chair: JERED - RN requests pt return to bed to rest and given increased confusion this morning    Gait Training  Gait Training: No (pt too weak/unsteady to safely attempt today)    PT Exercises  Exercise Treatment: x 10 BLE: Ankle pumps, LAQ, seated marching (at EOB)    Safety Devices  Type of Devices: All fall risk precautions in place;Bed alarm in place;Call light within reach;Gait belt;Patient at risk for falls;Left in bed;Nurse notified;All bushra prominences offloaded       Goals  Short Term Goals  Time Frame for Short Term Goals: 1 week, 7/17/24 (unless otherwise specified) - no goals met as of 7/11/24, all ongoing  Short Term Goal 1: Pt will transfer supine <-> sit with SBA  Short Term Goal 2: Pt will transfer sit <-> stand and bed>chair using rollator with SBA  Short Term Goal 3: Pt will ambulate x 75 feet using rollator with CGA/SBA x 1  Short Term Goal 4: By 7/14/24: Pt will tolerate 12-15 reps BLE exercise for strengthening, balance, and endurance  Patient Goals   Patient Goals : \"To get stronger\"    Education  Patient Education  Education Given To: Patient  Education Provided: Role of Therapy;Plan of 
effusion present.  There are early echocardiographic signs of cardiac tamponade. Evidence includes tricuspid transvalvular velocity variation during respiration.  Clinical correlation advised. Pericardial anterior effusion measures 1.2 cm. Pericardial posterior effusion measures 2.4 cm.   IVC/SVC: IVC was not assessed due to poor image quality.     Result Date: 7/9/2024    Left Ventricle: Grossly normal left ventricular systolic function.   Right Ventricle: Right ventricle size is grossly normal. Visually normal systolic function.   Pericardium: Large (>2 cm) circumferential pericardial effusion present.  There are early echocardiographic signs of cardiac tamponade. Evidence includes tricuspid transvalvular velocity variation during respiration. Pericardial anterior effusion measures 1.2 cm. Pericardial posterior effusion measures 2.4 cm.   IVC/SVC: IVC was not assessed due to poor image quality.     XR CHEST (2 VW)    Result Date: 7/9/2024  EXAMINATION: TWO XRAY VIEWS OF THE CHEST 7/9/2024 1:30 pm COMPARISON: 11/04/2021 HISTORY: ORDERING SYSTEM PROVIDED HISTORY: bradycardia TECHNOLOGIST PROVIDED HISTORY: Reason for exam:->bradycardia Reason for Exam: bradycardia and generalized weakness and fatigue FINDINGS: The lungs are without acute focal process.  There is no effusion or pneumothorax. The cardiomediastinal silhouette is stable. The osseous structures are stable.     Stable cardiomegaly.       CBC:   Recent Labs     07/09/24  1336 07/10/24  0441   WBC 5.6 6.8   HGB 11.8* 12.2    188     BMP:    Recent Labs     07/09/24  1336 07/10/24  0441   * 135*   K 4.8 4.0    101   CO2 19* 24   BUN 41* 32*   CREATININE 1.4* 1.1   GLUCOSE 113* 114*     Hepatic:   Recent Labs     07/09/24  1336   AST 20   ALT 11   BILITOT 0.3   ALKPHOS 95     Lipids:   Lab Results   Component Value Date/Time    CHOL 156 03/16/2011 05:59 AM    HDL 71 03/16/2011 05:59 AM    TRIG 97 03/16/2011 05:59 AM     Hemoglobin A1C:   Lab 
Goals: 1 week, 7/17/24 (unless otherwise specified)  Short Term Goal 1: Pt will transfer supine <-> sit with SBA  Short Term Goal 2: Pt will transfer sit <-> stand and bed>chair using rollator with SBA  Short Term Goal 3: Pt will ambulate x 75 feet using rollator with CGA/SBA x 1  Short Term Goal 4: By 7/14/24: Pt will tolerate 12-15 reps BLE exercise for strengthening, balance, and endurance  Patient Goals   Patient Goals : \"To get stronger\"       Education  Patient Education  Education Given To: Patient;Family (pt and dtr)  Education Provided: Role of Therapy;Plan of Care;Precautions;Transfer Training;Fall Prevention Strategies;Family Education;Equipment;Energy Conservation  Education Provided Comments: Pt educated on role of PT in acute setting and benefits of regular OOB activity in order to maintain/improve strength and lessen likelihood of developing hospital-acquired weakness; pt verbalizes understanding.  Education Method: Demonstration;Verbal  Barriers to Learning: Hearing  Education Outcome: Verbalized understanding;Demonstrated understanding;Continued education needed      Therapy Time   Individual Concurrent Group Co-treatment   Time In 0810         Time Out 0848         Minutes 38         Timed Code Treatment Minutes: 28 Minutes (10 minute eval + 28 minutes treatment)       Juliana Ken, PT, DPT #[id number]

## 2024-07-19 NOTE — PROGRESS NOTES
Sheyla Byrd  7/19/2024  [id number]    Chief Complaint: Bradycardia    Subjective:   No acute events overnight. She was able to have a large bowel movement after a suppository last night. Voiding trial yesterday, however patient was unable to urinate. Cunningham replaced. Today she reports feeling ok. She notes continued pain in her great right toe. Given colchicine dose yesterday and she is unsure if this helped. Great toe is erythematous and tender to palpation. Will give additional few days of colchicine. She continues to have orthostatic hypotension with therapies. BP is high at times, but I am more concerns about the lows.     Reviewed labs.     ROS: denies f/c, n/v, cp, sob    Objective:  Patient Vitals for the past 24 hrs:   BP Temp Temp src Pulse Resp SpO2   07/19/24 1015 138/64 -- -- 86 -- 94 %   07/19/24 0800 (!) 141/69 98.3 °F (36.8 °C) Oral 96 18 94 %   07/19/24 0733 (!) 164/79 -- -- 85 -- 92 %   07/18/24 2000 (!) 160/72 97.7 °F (36.5 °C) Oral 84 16 92 %     Gen: No distress, pleasant.   HEENT: Normocephalic, atraumatic.   CV: extremities well perfused   Resp: No respiratory distress. On room air   Abd: Soft, nondistended  Ext: R first toe is erythematous and tender to palpation  Neuro: Alert, oriented, appropriately interactive.   Psych: appropriate mood and affect    Wt Readings from Last 3 Encounters:   07/18/24 61.1 kg (134 lb 12.8 oz)   07/10/24 56 kg (123 lb 7.3 oz)   12/07/21 57.2 kg (126 lb)       Laboratory data:   Lab Results   Component Value Date    WBC 5.2 07/18/2024    HGB 10.0 (L) 07/18/2024    HCT 29.9 (L) 07/18/2024    MCV 94.8 07/18/2024     07/18/2024       Lab Results   Component Value Date/Time     07/18/2024 05:31 AM    K 5.0 07/18/2024 05:31 AM     07/18/2024 05:31 AM    CO2 25 07/18/2024 05:31 AM    BUN 34 07/18/2024 05:31 AM    CREATININE 1.1 07/18/2024 05:31 AM    GLUCOSE 104 07/18/2024 05:31 AM    CALCIUM 8.5 07/18/2024 05:31 AM        Therapy

## 2024-07-19 NOTE — PROGRESS NOTES
[facility]: ACUTE REHAB UNIT  SPEECH-LANGUAGE PATHOLOGY      [x] Daily  [] Weekly Care Conference Note  [] Discharge    Patient:Sheyla Byrd      :1926  MRN:[id number]  Rehab Dx/Hx: Bradycardia [R00.1]    Precautions: N/A  Home situation: Pt lives with daughter, pt independently manages finances and medications; pt currently drives  ST Dx: [] Aphasia  [] Dysarthria  [] Apraxia   [] Oropharyngeal dysphagia [x] Cognitive Impairment  [] Other:   Date of Admit: 2024  Room #: 0154/0154-01    Current functional status (updated daily):         Pt being seen for : [] Speech/Language Treatment  [] Dysphagia Treatment [x] Cognitive Treatment  [] Other:  Communication: [x]WFL  [] Aphasia  [] Dysarthria  [] Apraxia  [] Pragmatic Impairment [] Non-verbal  [] Hearing Loss  [] Other:   Cognition: [] WFL  [x] Mild  [x] Moderate  [] Severe [] Unable to Assess  [] Other:  Memory: [] WFL  [x] Mild  [x] Moderate  [] Severe [] Unable to Assess  [] Other:  Behavior: [x] Alert  [x] Cooperative  []  Pleasant  [] Confused  [] Agitated  [] Uncooperative  [] Distractible [] Motivated  [] Self-Limiting [] Anxious  [] Other:  Endurance:  [x] Adequate for participation in SLP sessions  [] Reduced overall  [] Lethargic  [] Other:  Safety: [] No concerns at this time  [x] Reduced insight into deficits  [x]  Reduced safety awareness [] Not following call light procedures  [] Unable to Assess  [] Other:    Current Diet Order:ADULT DIET; Regular  ADULT ORAL NUTRITION SUPPLEMENT; Breakfast; Standard High Calorie/High Protein Oral Supplement  Swallowing Precautions: Sit up for all meals and thereafter for 30 minutes, Eat with small bites (1/2 tsp; 1 tsp), and Alternate solids with liquids        Date: 2024      Tx session 1  5240-4820 Tx session 2  All tx needs met in session 1   Total Timed Code Min 30    Total Treatment Minutes 30    Individual Treatment Minutes 30    Group Treatment Minutes 0 0   Co-Treat Minutes 0 0

## 2024-07-19 NOTE — PROGRESS NOTES
ARU requirements, pacemaker, safety with transfers  Education Method: Demonstration;Verbal  Barriers to Learning: Cognition;Hearing  Education Outcome: Verbalized understanding;Continued education needed        Therapy Time   Individual Concurrent Group Co-treatment   Time In 1000         Time Out 1100         Minutes 60           Timed Code Treatment Minutes: 60 Minutes       Yuridia Liz, PT, 07/19/24 at 10:45 AM

## 2024-07-20 PROCEDURE — 6360000002 HC RX W HCPCS: Performed by: STUDENT IN AN ORGANIZED HEALTH CARE EDUCATION/TRAINING PROGRAM

## 2024-07-20 PROCEDURE — 6370000000 HC RX 637 (ALT 250 FOR IP): Performed by: STUDENT IN AN ORGANIZED HEALTH CARE EDUCATION/TRAINING PROGRAM

## 2024-07-20 PROCEDURE — 1280000000 HC REHAB R&B

## 2024-07-20 PROCEDURE — 2580000003 HC RX 258: Performed by: STUDENT IN AN ORGANIZED HEALTH CARE EDUCATION/TRAINING PROGRAM

## 2024-07-20 RX ADMIN — SENNOSIDES AND DOCUSATE SODIUM 2 TABLET: 50; 8.6 TABLET ORAL at 08:14

## 2024-07-20 RX ADMIN — Medication 5 MG: at 20:43

## 2024-07-20 RX ADMIN — LEVOTHYROXINE SODIUM 125 MCG: 0.12 TABLET ORAL at 05:48

## 2024-07-20 RX ADMIN — ASPIRIN 81 MG: 81 TABLET, COATED ORAL at 08:14

## 2024-07-20 RX ADMIN — CEFTRIAXONE SODIUM 1000 MG: 1 INJECTION, POWDER, FOR SOLUTION INTRAMUSCULAR; INTRAVENOUS at 17:07

## 2024-07-20 RX ADMIN — ENOXAPARIN SODIUM 30 MG: 100 INJECTION SUBCUTANEOUS at 08:13

## 2024-07-20 RX ADMIN — COLCHICINE 0.6 MG: 0.6 TABLET ORAL at 08:14

## 2024-07-20 RX ADMIN — ATORVASTATIN CALCIUM 20 MG: 10 TABLET, FILM COATED ORAL at 08:14

## 2024-07-20 ASSESSMENT — PAIN SCALES - GENERAL
PAINLEVEL_OUTOF10: 0
PAINLEVEL_OUTOF10: 0

## 2024-07-20 NOTE — CONSULTS
Patient:  Sheyla Byrd  YOB: 1926   CSN:  [id number]    Primary Care Provider:  Cornelius Gerardo MD       Urology Attending Consult Note     Reason for Consultation:  retention of urine    Chief Complaint: \"I could not urinate\"    HPI:  Sheyla is a 89 y.o. female who presented with to physical medicine rehab unit after cardiac dysfunction.  Admitted July 9, pacemaker intervention performed.  Postoperatively had urinary retention with Cunningham catheter indwelling then yesterday voiding trial was performed, catheter is out nearly 24 hours and then a bladder scan showed 450 mL.  So the Cunningham catheter was replaced.  Unclear if she had pain or pressure or any urination.    [facility] and she states she had no voiding issues prior to this.      Ct reviewed as below    Past Medical History:   Diagnosis Date    Amaurosis fugax of left eye 3/15/2011    Benign neoplasm     colon    Cancer of kidney (HCC) 11/03/2010    Right    Colonic polyp 1999    Cyst of ovary, left 9/2/2012    Dehydration 2/9/2011    Diverticulosis of colon 1999    Dyslipidemia 2008    GERD (gastroesophageal reflux disease)     Glaucoma     Hyperlipidemia     Hypertension 1960    IBS (irritable bowel syndrome)     Osteoporosis     Other specified gastritis without mention of hemorrhage 1998    Pericardial effusion 8/23/2011       Past Surgical History:   Procedure Laterality Date    BRONCHOSCOPY N/A 11/04/2019    BRONCHOSCOPY N/A 11/4/2019    BRONCHOSCOPY ALVEOLAR LAVAGE performed by Jorge Padron MD at [facility] ENDOSCOPY    BRONCHOSCOPY  11/4/2019    BRONCHOSCOPY THERAPUTIC ASPIRATION INITIAL performed by Jorge Padron MD at [facility] ENDOSCOPY    CHOLECYSTECTOMY, LAPAROSCOPIC  02/11/1998    COLONOSCOPY  08/24/2009    TA & Diverticulosis    COLONOSCOPY  07/17/2001    diverticulosis    COLONOSCOPY  08/29/2006    diverticulosis    EP DEVICE PROCEDURE N/A 7/10/2024    Insert PPM dual

## 2024-07-20 NOTE — PLAN OF CARE
Problem: Discharge Planning  Goal: Discharge to home or other facility with appropriate resources  Outcome: Progressing     Problem: Safety - Adult  Goal: Free from fall injury  7/20/2024 1157 by Spakrle Martínez RN  Outcome: Progressing     Problem: ABCDS Injury Assessment  Goal: Absence of physical injury  Outcome: Progressing

## 2024-07-21 VITALS
RESPIRATION RATE: 18 BRPM | WEIGHT: 133.5 LBS | OXYGEN SATURATION: 92 % | SYSTOLIC BLOOD PRESSURE: 161 MMHG | HEART RATE: 79 BPM | HEIGHT: 60 IN | BODY MASS INDEX: 26.21 KG/M2 | TEMPERATURE: 97.8 F | DIASTOLIC BLOOD PRESSURE: 82 MMHG

## 2024-07-21 PROCEDURE — 6370000000 HC RX 637 (ALT 250 FOR IP): Performed by: STUDENT IN AN ORGANIZED HEALTH CARE EDUCATION/TRAINING PROGRAM

## 2024-07-21 PROCEDURE — 6360000002 HC RX W HCPCS: Performed by: STUDENT IN AN ORGANIZED HEALTH CARE EDUCATION/TRAINING PROGRAM

## 2024-07-21 PROCEDURE — 1280000000 HC REHAB R&B

## 2024-07-21 PROCEDURE — 2580000003 HC RX 258: Performed by: STUDENT IN AN ORGANIZED HEALTH CARE EDUCATION/TRAINING PROGRAM

## 2024-07-21 RX ADMIN — ENOXAPARIN SODIUM 30 MG: 100 INJECTION SUBCUTANEOUS at 10:28

## 2024-07-21 RX ADMIN — SENNOSIDES AND DOCUSATE SODIUM 2 TABLET: 50; 8.6 TABLET ORAL at 10:28

## 2024-07-21 RX ADMIN — Medication 5 MG: at 20:10

## 2024-07-21 RX ADMIN — COLCHICINE 0.6 MG: 0.6 TABLET ORAL at 10:28

## 2024-07-21 RX ADMIN — CEFTRIAXONE SODIUM 1000 MG: 1 INJECTION, POWDER, FOR SOLUTION INTRAMUSCULAR; INTRAVENOUS at 17:47

## 2024-07-21 RX ADMIN — ATORVASTATIN CALCIUM 20 MG: 10 TABLET, FILM COATED ORAL at 10:28

## 2024-07-21 RX ADMIN — ASPIRIN 81 MG: 81 TABLET, COATED ORAL at 10:28

## 2024-07-21 RX ADMIN — LEVOTHYROXINE SODIUM 125 MCG: 0.12 TABLET ORAL at 05:23

## 2024-07-21 ASSESSMENT — PAIN SCALES - GENERAL: PAINLEVEL_OUTOF10: 0

## 2024-07-22 ENCOUNTER — APPOINTMENT (OUTPATIENT)
Dept: GENERAL RADIOLOGY | Age: 89
DRG: 948 | End: 2024-07-22
Attending: STUDENT IN AN ORGANIZED HEALTH CARE EDUCATION/TRAINING PROGRAM
Payer: MEDICARE

## 2024-07-22 LAB
ANION GAP SERPL CALCULATED.3IONS-SCNC: 10 MMOL/L (ref 3–16)
BASOPHILS # BLD: 0.1 K/UL (ref 0–0.2)
BASOPHILS NFR BLD: 1.2 %
BUN SERPL-MCNC: 29 MG/DL (ref 7–20)
CALCIUM SERPL-MCNC: 8.6 MG/DL (ref 8.3–10.6)
CHLORIDE SERPL-SCNC: 103 MMOL/L (ref 99–110)
CO2 SERPL-SCNC: 27 MMOL/L (ref 21–32)
CREAT SERPL-MCNC: 1 MG/DL (ref 0.6–1.2)
DEPRECATED RDW RBC AUTO: 13.7 % (ref 12.4–15.4)
EOSINOPHIL # BLD: 0.3 K/UL (ref 0–0.6)
EOSINOPHIL NFR BLD: 4.9 %
GFR SERPLBLD CREATININE-BSD FMLA CKD-EPI: 51 ML/MIN/{1.73_M2}
GLUCOSE SERPL-MCNC: 98 MG/DL (ref 70–99)
HCT VFR BLD AUTO: 32.6 % (ref 36–48)
HGB BLD-MCNC: 11 G/DL (ref 12–16)
LYMPHOCYTES # BLD: 0.9 K/UL (ref 1–5.1)
LYMPHOCYTES NFR BLD: 16.9 %
MCH RBC QN AUTO: 31.1 PG (ref 26–34)
MCHC RBC AUTO-ENTMCNC: 33.7 G/DL (ref 31–36)
MCV RBC AUTO: 92.3 FL (ref 80–100)
MONOCYTES # BLD: 0.5 K/UL (ref 0–1.3)
MONOCYTES NFR BLD: 9.2 %
NEUTROPHILS # BLD: 3.7 K/UL (ref 1.7–7.7)
NEUTROPHILS NFR BLD: 67.8 %
PLATELET # BLD AUTO: 288 K/UL (ref 135–450)
PMV BLD AUTO: 7.8 FL (ref 5–10.5)
POTASSIUM SERPL-SCNC: 4.1 MMOL/L (ref 3.5–5.1)
RBC # BLD AUTO: 3.53 M/UL (ref 4–5.2)
SODIUM SERPL-SCNC: 140 MMOL/L (ref 136–145)
WBC # BLD AUTO: 5.4 K/UL (ref 4–11)

## 2024-07-22 PROCEDURE — 97110 THERAPEUTIC EXERCISES: CPT

## 2024-07-22 PROCEDURE — 97530 THERAPEUTIC ACTIVITIES: CPT

## 2024-07-22 PROCEDURE — 97535 SELF CARE MNGMENT TRAINING: CPT

## 2024-07-22 PROCEDURE — 80048 BASIC METABOLIC PNL TOTAL CA: CPT

## 2024-07-22 PROCEDURE — 1280000000 HC REHAB R&B

## 2024-07-22 PROCEDURE — 6370000000 HC RX 637 (ALT 250 FOR IP): Performed by: STUDENT IN AN ORGANIZED HEALTH CARE EDUCATION/TRAINING PROGRAM

## 2024-07-22 PROCEDURE — 97116 GAIT TRAINING THERAPY: CPT

## 2024-07-22 PROCEDURE — 97130 THER IVNTJ EA ADDL 15 MIN: CPT

## 2024-07-22 PROCEDURE — 97129 THER IVNTJ 1ST 15 MIN: CPT

## 2024-07-22 PROCEDURE — 6360000002 HC RX W HCPCS: Performed by: STUDENT IN AN ORGANIZED HEALTH CARE EDUCATION/TRAINING PROGRAM

## 2024-07-22 PROCEDURE — 85025 COMPLETE CBC W/AUTO DIFF WBC: CPT

## 2024-07-22 PROCEDURE — 36415 COLL VENOUS BLD VENIPUNCTURE: CPT

## 2024-07-22 PROCEDURE — 71045 X-RAY EXAM CHEST 1 VIEW: CPT

## 2024-07-22 RX ADMIN — SENNOSIDES AND DOCUSATE SODIUM 2 TABLET: 50; 8.6 TABLET ORAL at 07:50

## 2024-07-22 RX ADMIN — ATORVASTATIN CALCIUM 20 MG: 10 TABLET, FILM COATED ORAL at 07:50

## 2024-07-22 RX ADMIN — ENOXAPARIN SODIUM 30 MG: 100 INJECTION SUBCUTANEOUS at 07:50

## 2024-07-22 RX ADMIN — ASPIRIN 81 MG: 81 TABLET, COATED ORAL at 07:50

## 2024-07-22 RX ADMIN — ACETAMINOPHEN 650 MG: 325 TABLET ORAL at 05:23

## 2024-07-22 RX ADMIN — Medication 5 MG: at 19:45

## 2024-07-22 NOTE — PROGRESS NOTES
Comprehensive Nutrition Assessment    Type and Reason for Visit:  Reassess    Nutrition Recommendations/Plan:   Continue general diet   Modify ONS to Ensure Compact daily   Encourage PO intakes   Monitor nutrition adequacy, pertinent labs, bowel habits, wt changes, and clinical progress     Malnutrition Assessment:  Malnutrition Status:  At risk for malnutrition (07/22/24 1312)    Context:  Acute Illness     Findings of the 6 clinical characteristics of malnutrition:  Energy Intake:  Mild decrease in energy intake  Body Fat Loss:  No significant body fat loss     Muscle Mass Loss:  No significant muscle mass loss      Nutrition Assessment:    Follow up: Pt nutritionally at risk AEB variable PO intakes. Pt reports good appetite. Reports occasionally eats poorly d/t not liking the food provided. Reports will drink ONS, but only some. Offered 4 oz ONS, pt agreeable. Denies any further nutrition questions or concerns. Will monitor.    Nutrition Related Findings:    Labs reviewed. Active BS. BM on 7/18. +Bowel regimen. Wound Type: Surgical Incision       Current Nutrition Intake & Therapies:    Average Meal Intake: 1-25%, 51-75%, %  Average Supplements Intake: Unable to assess  ADULT DIET; Regular  ADULT ORAL NUTRITION SUPPLEMENT; Breakfast; Standard High Calorie/High Protein Oral Supplement    Anthropometric Measures:  Height: 152.4 cm (5')  Ideal Body Weight (IBW): 100 lbs (45 kg)    Admission Body Weight: 60.3 kg (133 lb) (bed scale)  Current Body Weight: 59.4 kg (131 lb), 133 % IBW. Weight Source: Bed Scale  Current BMI (kg/m2): 25.6                          BMI Categories: Overweight (BMI 25.0-29.9)    Estimated Daily Nutrient Needs:  Energy Requirements Based On: Kcal/kg (25-30)  Weight Used for Energy Requirements: Current  Energy (kcal/day): 0350-9619 kcal  Weight Used for Protein Requirements: Ideal (1.0-1.2 g/kg)  Protein (g/day): 60-72 g  Method Used for Fluid Requirements: 1 ml/kcal  Fluid (ml/day):

## 2024-07-22 NOTE — PROGRESS NOTES
Occupational Therapy  Facility/Department: [facility]  Rehabilitation Occupational Therapy Daily Treatment Note    Date: 24  Patient Name: Sheyla Byrd       Room: 0154/0154-01  MRN: [id number]  Account: [id number]   : 1926  (89 y.o.) Gender: female            Pt was bathed during OT session this date. Pt was Showered with soap/water with Stand By Assistance.          Past Medical History:  has a past medical history of Amaurosis fugax of left eye, Benign neoplasm, Cancer of kidney (HCC), Colonic polyp, Cyst of ovary, left, Dehydration, Diverticulosis of colon, Dyslipidemia, GERD (gastroesophageal reflux disease), Glaucoma, Hyperlipidemia, Hypertension, IBS (irritable bowel syndrome), Osteoporosis, Other specified gastritis without mention of hemorrhage, and Pericardial effusion.  Past Surgical History:   has a past surgical history that includes Upper gastrointestinal endoscopy (1998); Colonoscopy (2009); Upper gastrointestinal endoscopy (09/10/1999); Colonoscopy (2001); Upper gastrointestinal endoscopy (2001); Upper gastrointestinal endoscopy (2004); Colonoscopy (2006); Upper gastrointestinal endoscopy (2007); partial nephrectomy (2010); Cholecystectomy, laparoscopic (1998); bronchoscopy (N/A, 2019); bronchoscopy (N/A, 2019); bronchoscopy (2019); and ep device procedure (N/A, 7/10/2024).    Restrictions  Restrictions/Precautions: Fall Risk, General Precautions, Surgical Protocols  Implants present? : Pacemaker  Sternal Precautions: on LUE only due to pacemaker placement on 7/10/24  Other position/activity restrictions: RUE IV, LUE pacemaker precautions, CHANTE hose  Required Braces or Orthoses  Left Upper Extremity Brace/Splint: Sling  Left Upper Extremity Brace/Splint: Sling/swathe for 24 hrs post-pacemaker placement on 7/10/24  Required Braces or Orthoses?: No  Equipment Used: Bed, Wheelchair    Subjective  Subjective: Pt in

## 2024-07-22 NOTE — PROGRESS NOTES
Sheyla Byrd  7/22/2024  [id number]    Chief Complaint: Bradycardia    Subjective:   No acute events overnight. Today Sheyla is seen with therapy. She is standing at the sink, brushing her teeth. She reports some improvement in the pain in her toe. She reports cough. Will obtain XR chest. Blood pressure remains labile.     Reviewed labs.     ROS: denies f/c, n/v, cp, sob    Objective:  Patient Vitals for the past 24 hrs:   BP Temp Temp src Pulse Resp SpO2 Weight   07/22/24 1020 (!) 97/53 -- -- 88 -- -- --   07/22/24 0952 (!) 146/64 -- -- 80 -- -- --   07/22/24 0905 117/60 -- -- 84 -- 94 % --   07/22/24 0138 -- -- -- -- -- -- 59.5 kg (131 lb 3.2 oz)   07/21/24 2000 (!) 161/82 97.8 °F (36.6 °C) Oral 79 18 92 % --     Gen: No distress, pleasant.   HEENT: Normocephalic, atraumatic.   CV: extremities well perfused   Resp: No respiratory distress.Lungs CTAB  Abd: Soft, nondistended  Ext: R first toe is erythematous, improved from prior  Neuro: Alert, oriented, appropriately interactive.   Psych: appropriate mood and affect    Wt Readings from Last 3 Encounters:   07/22/24 59.5 kg (131 lb 3.2 oz)   07/10/24 56 kg (123 lb 7.3 oz)   12/07/21 57.2 kg (126 lb)       Laboratory data:   Lab Results   Component Value Date    WBC 5.4 07/22/2024    HGB 11.0 (L) 07/22/2024    HCT 32.6 (L) 07/22/2024    MCV 92.3 07/22/2024     07/22/2024       Lab Results   Component Value Date/Time     07/22/2024 06:38 AM    K 4.1 07/22/2024 06:38 AM     07/22/2024 06:38 AM    CO2 27 07/22/2024 06:38 AM    BUN 29 07/22/2024 06:38 AM    CREATININE 1.0 07/22/2024 06:38 AM    GLUCOSE 98 07/22/2024 06:38 AM    CALCIUM 8.6 07/22/2024 06:38 AM        Therapy progress:  Physical therapy:  Bed Mobility:     Sit>supine:     Supine>sit:  Assistance Level: Minimal assistance  Skilled Clinical Factors: with HOB elevated, cues for precautions  Transfers:     Sit>stand:  Assistance Level: Minimal assistance, Contact guard assist  Skilled

## 2024-07-22 NOTE — PLAN OF CARE
Problem: Safety - Adult  Goal: Free from fall injury  7/22/2024 0843 by Sparkle Martínez RN  Outcome: Progressing     Problem: ABCDS Injury Assessment  Goal: Absence of physical injury  Outcome: Progressing     Problem: Discharge Planning  Goal: Discharge to home or other facility with appropriate resources  Outcome: Progressing

## 2024-07-22 NOTE — PROGRESS NOTES
Physical Therapy  Facility/Department: [facility]  Rehabilitation Physical Therapy Treatment Note    NAME: Sheyla Byrd  : 1926 (89 y.o.)  MRN: [id number]  CODE STATUS: Full Code    Date of Service: 24       Restrictions:  Restrictions/Precautions: Fall Risk, General Precautions, Surgical Protocols  Required Braces or Orthoses  Left Upper Extremity Brace/Splint: Sling  Left Upper Extremity Brace/Splint: Sling/swathe for 24 hrs post-pacemaker placement on 7/10/24  Position Activity Restriction  Sternal Precautions: No Pushing, No Pulling, 5# Lifting Restrictions  Sternal Precautions: on LUE only due to pacemaker placement on 7/10/24  Other position/activity restrictions: RUE IV, LUE pacemaker precautions, CHANTE hose     SUBJECTIVE  Subjective  Subjective: pt found in recliner, reports fatigue  Pain: reports continued pain in R big toe but does not rate          OBJECTIVE  Cognition  Overall Cognitive Status: Exceptions  Arousal/Alertness: Appropriate responses to stimuli  Following Commands: Follows one step commands with repetition  Attention Span: Appears intact  Memory: Decreased recall of precautions;Decreased short term memory;Impaired  Safety Judgement: Decreased awareness of need for assistance;Decreased awareness of need for safety;Impaired  Problem Solving: Assistance required to identify errors made;Assistance required to implement solutions;Decreased awareness of errors;Assistance required to generate solutions;Able to problem solve independently  Insights: Decreased awareness of deficits  Initiation: Requires cues for some  Sequencing: Requires cues for some  Orientation  Overall Orientation Status: Within Functional Limits  Orientation Level: Oriented to person;Oriented to place;Oriented to time;Oriented to situation    Functional Mobility  Sit to Supine  Assistance Level: Stand by assist  Supine to Sit  Assistance Level: Minimal assistance  Sit to Stand  Assistance Level: Contact guard

## 2024-07-22 NOTE — PROGRESS NOTES
[facility]: ACUTE REHAB UNIT  SPEECH-LANGUAGE PATHOLOGY      [x] Daily  [] Weekly Care Conference Note  [] Discharge    Patient:Sheyla Byrd      :1926  MRN:[id number]  Rehab Dx/Hx: Bradycardia [R00.1]    Precautions: N/A  Home situation: Pt lives with daughter, pt independently manages finances and medications; pt currently drives  ST Dx: [] Aphasia  [] Dysarthria  [] Apraxia   [] Oropharyngeal dysphagia [x] Cognitive Impairment  [] Other:   Date of Admit: 2024  Room #: 0154/0154-01    Current functional status (updated daily):         Pt being seen for : [] Speech/Language Treatment  [] Dysphagia Treatment [x] Cognitive Treatment  [] Other:  Communication: [x]WFL  [] Aphasia  [] Dysarthria  [] Apraxia  [] Pragmatic Impairment [] Non-verbal  [] Hearing Loss  [] Other:   Cognition: [] WFL  [x] Mild  [x] Moderate  [] Severe [] Unable to Assess  [] Other:  Memory: [] WFL  [x] Mild  [x] Moderate  [] Severe [] Unable to Assess  [] Other:  Behavior: [x] Alert  [x] Cooperative  []  Pleasant  [] Confused  [] Agitated  [] Uncooperative  [] Distractible [] Motivated  [] Self-Limiting [] Anxious  [] Other:  Endurance:  [x] Adequate for participation in SLP sessions  [] Reduced overall  [] Lethargic  [] Other:  Safety: [] No concerns at this time  [x] Reduced insight into deficits  [x]  Reduced safety awareness [] Not following call light procedures  [] Unable to Assess  [] Other:    Current Diet Order:ADULT DIET; Regular  ADULT ORAL NUTRITION SUPPLEMENT; Breakfast; Standard High Calorie/High Protein Oral Supplement  Swallowing Precautions: Sit up for all meals and thereafter for 30 minutes, Eat with small bites (1/2 tsp; 1 tsp), and Alternate solids with liquids        Date: 2024      Tx session 1  0830 - 0900 Tx session 2  All tx needs met in session 1   Total Timed Code Min 30    Total Treatment Minutes 30    Individual Treatment Minutes 30    Group Treatment Minutes 0 0   Co-Treat Minutes 0 0

## 2024-07-23 PROCEDURE — 6370000000 HC RX 637 (ALT 250 FOR IP): Performed by: STUDENT IN AN ORGANIZED HEALTH CARE EDUCATION/TRAINING PROGRAM

## 2024-07-23 PROCEDURE — 97129 THER IVNTJ 1ST 15 MIN: CPT

## 2024-07-23 PROCEDURE — 97110 THERAPEUTIC EXERCISES: CPT

## 2024-07-23 PROCEDURE — 97116 GAIT TRAINING THERAPY: CPT

## 2024-07-23 PROCEDURE — 97130 THER IVNTJ EA ADDL 15 MIN: CPT

## 2024-07-23 PROCEDURE — 97530 THERAPEUTIC ACTIVITIES: CPT

## 2024-07-23 PROCEDURE — 6360000002 HC RX W HCPCS: Performed by: STUDENT IN AN ORGANIZED HEALTH CARE EDUCATION/TRAINING PROGRAM

## 2024-07-23 PROCEDURE — 97535 SELF CARE MNGMENT TRAINING: CPT

## 2024-07-23 PROCEDURE — 1280000000 HC REHAB R&B

## 2024-07-23 RX ADMIN — ATORVASTATIN CALCIUM 20 MG: 10 TABLET, FILM COATED ORAL at 09:15

## 2024-07-23 RX ADMIN — ASPIRIN 81 MG: 81 TABLET, COATED ORAL at 09:15

## 2024-07-23 RX ADMIN — ENOXAPARIN SODIUM 30 MG: 100 INJECTION SUBCUTANEOUS at 09:15

## 2024-07-23 RX ADMIN — LEVOTHYROXINE SODIUM 125 MCG: 0.12 TABLET ORAL at 05:50

## 2024-07-23 RX ADMIN — Medication 5 MG: at 19:49

## 2024-07-23 RX ADMIN — SENNOSIDES AND DOCUSATE SODIUM 2 TABLET: 50; 8.6 TABLET ORAL at 09:15

## 2024-07-23 NOTE — PROGRESS NOTES
Occupational Therapy  Facility/Department: [facility]  Rehabilitation Occupational Therapy Daily Treatment Note    Date: 24  Patient Name: Sheyla Byrd       Room: 0154/0154-01  MRN: [id number]  Account: [id number]   : 1926  (89 y.o.) Gender: female                    Past Medical History:  has a past medical history of Amaurosis fugax of left eye, Benign neoplasm, Cancer of kidney (HCC), Colonic polyp, Cyst of ovary, left, Dehydration, Diverticulosis of colon, Dyslipidemia, GERD (gastroesophageal reflux disease), Glaucoma, Hyperlipidemia, Hypertension, IBS (irritable bowel syndrome), Osteoporosis, Other specified gastritis without mention of hemorrhage, and Pericardial effusion.  Past Surgical History:   has a past surgical history that includes Upper gastrointestinal endoscopy (1998); Colonoscopy (2009); Upper gastrointestinal endoscopy (09/10/1999); Colonoscopy (2001); Upper gastrointestinal endoscopy (2001); Upper gastrointestinal endoscopy (2004); Colonoscopy (2006); Upper gastrointestinal endoscopy (2007); partial nephrectomy (2010); Cholecystectomy, laparoscopic (1998); bronchoscopy (N/A, 2019); bronchoscopy (N/A, 2019); bronchoscopy (2019); and ep device procedure (N/A, 7/10/2024).    Restrictions  Restrictions/Precautions: Fall Risk, General Precautions, Surgical Protocols  Implants present? : Pacemaker  Sternal Precautions: on LUE only due to pacemaker placement on 7/10/24  Other position/activity restrictions: RUE IV, LUE pacemaker precautions, CHANTE hose  Required Braces or Orthoses  Left Upper Extremity Brace/Splint: Sling  Left Upper Extremity Brace/Splint: Sling/swathe for 24 hrs post-pacemaker placement on 7/10/24  Required Braces or Orthoses?: No  Equipment Used: Bed, Wheelchair    Subjective  Subjective: Pt in recliner, pleasant, reports pain in R big toe but does not rate, able to continue with therapy with

## 2024-07-23 NOTE — CARE COORDINATION
Weekly team care conference with interdisciplinary team on:  7/24/2024  ?   Chart reviewed for length of stay. IP day #  10 Unit:  ARU  Diagnosis and current status as per MD progress:  Bradycardia  Consultants Following:  Cardiology; Dietician; PT/OT/SLP  Planned Discharge Date:  07/29/24  Durable medical equipment needed:   Shower john; Will need a rolling walker  walker and tub transfer bench ( she has a Rollator) at home; Family has completed family training on 7/23/24. Team has recommended Life Alert  Discharge Plan:  Home with daughter referral place to [facility] to follow.  .Ade Diez RN

## 2024-07-23 NOTE — PLAN OF CARE
Problem: Safety - Adult  Goal: Free from fall injury  7/23/2024 0925 by Mehdi Mercado, RN  Outcome: Progressing  7/22/2024 2136 by Mirtha Bean, RN  Outcome: Progressing

## 2024-07-23 NOTE — PLAN OF CARE
Problem: Safety - Adult  Goal: Free from fall injury  7/22/2024 2136 by Mirtha Bean, RN  Outcome: Progressing

## 2024-07-23 NOTE — PROGRESS NOTES
[facility]: ACUTE REHAB UNIT  SPEECH-LANGUAGE PATHOLOGY      [x] Daily  [] Weekly Care Conference Note  [] Discharge    Patient:Sheyla Byrd      :1926  MRN:[id number]  Rehab Dx/Hx: Bradycardia [R00.1]    Precautions: N/A  Home situation: Pt lives with daughter, pt independently manages finances and medications; pt currently drives  ST Dx: [] Aphasia  [] Dysarthria  [] Apraxia   [] Oropharyngeal dysphagia [x] Cognitive Impairment  [] Other:   Date of Admit: 2024  Room #: 0154/0154-01    Current functional status (updated daily):         Pt being seen for : [] Speech/Language Treatment  [] Dysphagia Treatment [x] Cognitive Treatment  [] Other:  Communication: [x]WFL  [] Aphasia  [] Dysarthria  [] Apraxia  [] Pragmatic Impairment [] Non-verbal  [] Hearing Loss  [] Other:   Cognition: [] WFL  [x] Mild  [x] Moderate  [] Severe [] Unable to Assess  [] Other:  Memory: [] WFL  [x] Mild  [x] Moderate  [] Severe [] Unable to Assess  [] Other:  Behavior: [x] Alert  [x] Cooperative  []  Pleasant  [] Confused  [] Agitated  [] Uncooperative  [] Distractible [] Motivated  [] Self-Limiting [] Anxious  [] Other:  Endurance:  [x] Adequate for participation in SLP sessions  [] Reduced overall  [] Lethargic  [] Other:  Safety: [] No concerns at this time  [x] Reduced insight into deficits  [x]  Reduced safety awareness [] Not following call light procedures  [] Unable to Assess  [] Other:    Current Diet Order:ADULT DIET; Regular  ADULT ORAL NUTRITION SUPPLEMENT; Breakfast; Standard 4 oz Oral Supplement  Swallowing Precautions: Sit up for all meals and thereafter for 30 minutes, Eat with small bites (1/2 tsp; 1 tsp), and Alternate solids with liquids        Date: 2024      Tx session 1  3703-0193 Tx session 2  All tx needs met in session 1   Total Timed Code Min 30    Total Treatment Minutes 30    Individual Treatment Minutes 30    Group Treatment Minutes 0 0   Co-Treat Minutes 0 0   Variance/Reason:  N/A    Pain No c/o

## 2024-07-23 NOTE — PROGRESS NOTES
Needed: Yes  Walker: Rolling    Goals  Patient Goals   Patient Goals : \"to go home\"  Short Term Goals  Time Frame for Short Term Goals: 9 days 7/22  Short Term Goal 1: pt will complete bed mobility with min A- GOAL MET  Short Term Goal 2: pt will complete functional transfers with min A- GOAL MET  Short Term Goal 3: pt will ambulate 15 ft with LRAD and min A- GOAL MET  Long Term Goals  Time Frame for Long Term Goals : 14 days 7/27  Long Term Goal 1: pt will complete bed mobility with SBA  Long Term Goal 2: pt will complete functional transfer with CGA and LRAd  Long Term Goal 3: pt will ambulate 50 ft with LRAD and CGA  Long Term Goal 4: pt will  object from floor with LRAD and SBA  Long Term Goal 5: pt will complete car transfer with LRAD and CGA    PLAN OF CARE/SAFETY  Physical Therapy Plan  General Plan: 5-7 times per week  Current Treatment Recommendations: Strengthening;ROM;Balance training;Functional mobility training;Transfer training;Endurance training;Gait training;Home exercise program;Safety education & training;Patient/Caregiver education & training;Equipment evaluation, education, & procurement;Therapeutic activities  Safety Devices  Type of Devices: All fall risk precautions in place;Call light within reach;Gait belt;Patient at risk for falls;Nurse notified;All bushra prominences offloaded;Chair alarm in place;Left in chair    EDUCATION  Education  Education Given To: Patient  Education Provided: Role of Therapy;Mobility Training;Plan of Care;Precautions;Safety;ADL Function;DME/Home Modifications;Equipment;Transfer Training;Cognition  Education Provided Comments: role of PT, ARU requirements, pacemaker, safety with transfers  Education Method: Demonstration;Verbal  Barriers to Learning: Cognition;Hearing  Education Outcome: Verbalized understanding;Continued education needed        Therapy Time   Individual Concurrent Group Co-treatment   Time In 1030         Time Out 1130         Minutes 60

## 2024-07-23 NOTE — PROGRESS NOTES
Sheyla Byrd  7/23/2024  [id number]    Chief Complaint: Bradycardia    Subjective:   No acute events overnight. Today Sheyla is seen in her room without family present. She reports feeling tired. She notes that she still has some intermittent toe pain, but feels that it is overall improved. She is hopeful to have lagunas catheter out before going home. Discussed voiding trial tomorrow versus Thursday.     Reviewed labs.     ROS: denies f/c, n/v, cp, sob    Objective:  Patient Vitals for the past 24 hrs:   BP Temp Temp src Pulse Resp SpO2   07/23/24 0900 111/70 97.6 °F (36.4 °C) Oral 73 16 96 %   07/22/24 1930 (!) 165/78 98.1 °F (36.7 °C) Oral 72 18 95 %   07/22/24 1241 122/72 -- -- 71 -- 96 %   07/22/24 1031 (!) 155/74 97.6 °F (36.4 °C) Oral 81 16 96 %   07/22/24 1020 (!) 97/53 -- -- 88 -- --   07/22/24 0952 (!) 146/64 -- -- 80 -- --     Gen: No distress, pleasant.   HEENT: Normocephalic, atraumatic.   CV: extremities well perfused   Resp: No respiratory distress. On room air  Abd: Soft, nondistended  Ext: no long bone deformities  Neuro: Alert, oriented, appropriately interactive.   Psych: appropriate mood and affect    Wt Readings from Last 3 Encounters:   07/22/24 59.5 kg (131 lb 3.2 oz)   07/10/24 56 kg (123 lb 7.3 oz)   12/07/21 57.2 kg (126 lb)       Laboratory data:   Lab Results   Component Value Date    WBC 5.4 07/22/2024    HGB 11.0 (L) 07/22/2024    HCT 32.6 (L) 07/22/2024    MCV 92.3 07/22/2024     07/22/2024       Lab Results   Component Value Date/Time     07/22/2024 06:38 AM    K 4.1 07/22/2024 06:38 AM     07/22/2024 06:38 AM    CO2 27 07/22/2024 06:38 AM    BUN 29 07/22/2024 06:38 AM    CREATININE 1.0 07/22/2024 06:38 AM    GLUCOSE 98 07/22/2024 06:38 AM    CALCIUM 8.6 07/22/2024 06:38 AM        Therapy progress:  Physical therapy:  Bed Mobility:     Sit>supine:  Assistance Level: Stand by assist  Supine>sit:  Assistance Level: Minimal assistance  Skilled Clinical Factors:

## 2024-07-23 NOTE — PATIENT CARE CONFERENCE
Tolerance: Patient tolerated treatment well;Treatment limited secondary to medical complications  Discharge Recommendations: 24 hour supervision or assist;Home with Home health OT;S Level 1  OT Equipment Recommendations  Equipment Needed: Yes  Mobility Devices: ADL Assistive Devices  ADL Assistive Devices: Transfer Tub Bench       SPEECH THERAPY   Speech therapy observed barriers to dc:               Baseline: Pt lives with daughter, pt independently manages finances and medications; pt currently drives               Current level: mild to moderate cognitive deficits               Barriers to DC: cognitive deficits, hearing difficulties, limited insight into deficits              Needs in order to achieve dc home/next level of care: 24 hour supervision, assist with medications and finances, improved insight      Speech Therapy interventions:  Dysphagia: N/A  Speech/Language/Cognition: Compensatory strategy training and carryover, recall/STM, problem solving, reasoning, exec function, thought organization, attention.     Dysphagia Goals:  N/A    Speech/Language/Cog Goals:  Time Frame for Long Term Goals: 14 Days (07/27/24)  Goal 1: Pt will improve overall cognitive linguistic skills to increase safety and independence to return to PLOF - 07/23 - ongoing, progressing         Short Term Goals  Time Frame for Short Term Goals: 10 Days (07/23/24)  Goal 1: Pt will complete recall tasks with 80% acc given min cues for use of compensatory strategies. - 07/23 - ongoing, progressing   Goal 2: Pt will complete problem solving and safety awareness tasks with 90% acc given min cues. - 07/23 - ongoing, progressing   Goal 3: Pt will complete executive function tasks (meds, money, time) with 90% acc given min cues. - 07/23 - ongoing, progressing       ST Assessment:   Pt agreeable and cooperative to SLP tx this date to target cog function. Pt stated that she was tired and \"may fall asleep\", but was cooperative and participatory

## 2024-07-24 PROCEDURE — 97116 GAIT TRAINING THERAPY: CPT

## 2024-07-24 PROCEDURE — 97530 THERAPEUTIC ACTIVITIES: CPT

## 2024-07-24 PROCEDURE — 6360000002 HC RX W HCPCS: Performed by: STUDENT IN AN ORGANIZED HEALTH CARE EDUCATION/TRAINING PROGRAM

## 2024-07-24 PROCEDURE — 97110 THERAPEUTIC EXERCISES: CPT

## 2024-07-24 PROCEDURE — 6370000000 HC RX 637 (ALT 250 FOR IP): Performed by: STUDENT IN AN ORGANIZED HEALTH CARE EDUCATION/TRAINING PROGRAM

## 2024-07-24 PROCEDURE — 97129 THER IVNTJ 1ST 15 MIN: CPT

## 2024-07-24 PROCEDURE — 97130 THER IVNTJ EA ADDL 15 MIN: CPT

## 2024-07-24 PROCEDURE — 51702 INSERT TEMP BLADDER CATH: CPT

## 2024-07-24 PROCEDURE — 97535 SELF CARE MNGMENT TRAINING: CPT

## 2024-07-24 PROCEDURE — 1280000000 HC REHAB R&B

## 2024-07-24 RX ORDER — ASPIRIN 81 MG/1
81 TABLET ORAL DAILY
Qty: 30 TABLET | Refills: 1 | Status: SHIPPED | OUTPATIENT
Start: 2024-07-24

## 2024-07-24 RX ORDER — HYDRALAZINE HYDROCHLORIDE 10 MG/1
10 TABLET, FILM COATED ORAL EVERY 6 HOURS PRN
Status: DISCONTINUED | OUTPATIENT
Start: 2024-07-24 | End: 2024-07-27 | Stop reason: HOSPADM

## 2024-07-24 RX ORDER — ATORVASTATIN CALCIUM 20 MG/1
20 TABLET, FILM COATED ORAL DAILY
Qty: 30 TABLET | Refills: 1 | Status: SHIPPED | OUTPATIENT
Start: 2024-07-24

## 2024-07-24 RX ADMIN — LEVOTHYROXINE SODIUM 125 MCG: 0.12 TABLET ORAL at 06:14

## 2024-07-24 RX ADMIN — SENNOSIDES AND DOCUSATE SODIUM 2 TABLET: 50; 8.6 TABLET ORAL at 07:23

## 2024-07-24 RX ADMIN — ATORVASTATIN CALCIUM 20 MG: 10 TABLET, FILM COATED ORAL at 07:25

## 2024-07-24 RX ADMIN — Medication 5 MG: at 20:03

## 2024-07-24 RX ADMIN — HYDRALAZINE HYDROCHLORIDE 10 MG: 10 TABLET ORAL at 20:04

## 2024-07-24 RX ADMIN — ASPIRIN 81 MG: 81 TABLET, COATED ORAL at 07:23

## 2024-07-24 RX ADMIN — ENOXAPARIN SODIUM 30 MG: 100 INJECTION SUBCUTANEOUS at 07:23

## 2024-07-24 NOTE — PROGRESS NOTES
Physical Therapy  Facility/Department: [facility]  Rehabilitation Physical Therapy Treatment Note    NAME: Sheyla Byrd  : 1926 (89 y.o.)  MRN: [id number]  CODE STATUS: Full Code    Date of Service: 24       Restrictions:  Restrictions/Precautions: Fall Risk, General Precautions, Surgical Protocols  Required Braces or Orthoses  Left Upper Extremity Brace/Splint: Sling  Left Upper Extremity Brace/Splint: Sling/swathe for 24 hrs post-pacemaker placement on 7/10/24  Position Activity Restriction  Sternal Precautions: No Pushing, No Pulling, 5# Lifting Restrictions  Sternal Precautions: on LUE only due to pacemaker placement on 7/10/24  Other position/activity restrictions: RUE IV, LUE pacemaker precautions, CHANTE hose     SUBJECTIVE  Subjective  Subjective: pt found in recliner  Pain: denies pain          OBJECTIVE  Cognition  Overall Cognitive Status: Exceptions  Arousal/Alertness: Appropriate responses to stimuli  Following Commands: Follows one step commands with repetition  Attention Span: Appears intact  Memory: Decreased recall of precautions;Decreased short term memory;Impaired  Safety Judgement: Decreased awareness of need for assistance;Decreased awareness of need for safety;Impaired  Problem Solving: Assistance required to identify errors made;Assistance required to implement solutions;Decreased awareness of errors;Assistance required to generate solutions;Able to problem solve independently  Insights: Decreased awareness of deficits  Initiation: Requires cues for some  Sequencing: Requires cues for some  Orientation  Overall Orientation Status: Within Functional Limits  Orientation Level: Oriented to person;Oriented to place;Oriented to time;Oriented to situation    Functional Mobility  Sit to Stand  Assistance Level: Contact guard assist;Stand by assist  Skilled Clinical Factors: from wc and EOB to RW  Stand to Sit  Assistance Level: Stand by assist;Contact guard assist      Environmental

## 2024-07-24 NOTE — PLAN OF CARE
Skin assessments completed Q shift and PRN. Staff assists patient with repositioning and pillow support minimally Q 2 hours while in bed.

## 2024-07-24 NOTE — PROGRESS NOTES
Occupational Therapy  Facility/Department: [facility]  Rehabilitation Occupational Therapy Daily Treatment Note    Date: 24  Patient Name: Sheyla Byrd       Room: 0154/0154-01  MRN: [id number]  Account: [id number]   : 1926  (89 y.o.) Gender: female                    Past Medical History:  has a past medical history of Amaurosis fugax of left eye, Benign neoplasm, Cancer of kidney (HCC), Colonic polyp, Cyst of ovary, left, Dehydration, Diverticulosis of colon, Dyslipidemia, GERD (gastroesophageal reflux disease), Glaucoma, Hyperlipidemia, Hypertension, IBS (irritable bowel syndrome), Osteoporosis, Other specified gastritis without mention of hemorrhage, and Pericardial effusion.  Past Surgical History:   has a past surgical history that includes Upper gastrointestinal endoscopy (1998); Colonoscopy (2009); Upper gastrointestinal endoscopy (09/10/1999); Colonoscopy (2001); Upper gastrointestinal endoscopy (2001); Upper gastrointestinal endoscopy (2004); Colonoscopy (2006); Upper gastrointestinal endoscopy (2007); partial nephrectomy (2010); Cholecystectomy, laparoscopic (1998); bronchoscopy (N/A, 2019); bronchoscopy (N/A, 2019); bronchoscopy (2019); and ep device procedure (N/A, 7/10/2024).    Restrictions  Restrictions/Precautions: Fall Risk, General Precautions, Surgical Protocols  Implants present? : Pacemaker  Sternal Precautions: on LUE only due to pacemaker placement on 7/10/24  Other position/activity restrictions: RUE IV, LUE pacemaker precautions, CHANTE hose  Required Braces or Orthoses  Left Upper Extremity Brace/Splint: Sling  Left Upper Extremity Brace/Splint: Sling/swathe for 24 hrs post-pacemaker placement on 7/10/24  Required Braces or Orthoses?: No  Equipment Used: Bed, Wheelchair    Subjective  Subjective: Pt in recliner, eating breakfast, no pain, R big toe doing better, agreeable to OT session, /64, HR 75, O2

## 2024-07-24 NOTE — PROGRESS NOTES
[facility]: ACUTE REHAB UNIT  SPEECH-LANGUAGE PATHOLOGY      [x] Daily  [] Weekly Care Conference Note  [] Discharge    Patient:Sheyla Byrd      :1926  MRN:[id number]  Rehab Dx/Hx: Bradycardia [R00.1]    Precautions: N/A  Home situation: Pt lives with daughter, pt independently manages finances and medications; pt currently drives  ST Dx: [] Aphasia  [] Dysarthria  [] Apraxia   [] Oropharyngeal dysphagia [x] Cognitive Impairment  [] Other:   Date of Admit: 2024  Room #: 0154/0154-01    Current functional status (updated daily):         Pt being seen for : [] Speech/Language Treatment  [] Dysphagia Treatment [x] Cognitive Treatment  [] Other:  Communication: [x]WFL  [] Aphasia  [] Dysarthria  [] Apraxia  [] Pragmatic Impairment [] Non-verbal  [] Hearing Loss  [] Other:   Cognition: [] WFL  [x] Mild  [x] Moderate  [] Severe [] Unable to Assess  [] Other:  Memory: [] WFL  [x] Mild  [x] Moderate  [] Severe [] Unable to Assess  [] Other:  Behavior: [x] Alert  [x] Cooperative  []  Pleasant  [] Confused  [] Agitated  [] Uncooperative  [] Distractible [] Motivated  [] Self-Limiting [] Anxious  [] Other:  Endurance:  [x] Adequate for participation in SLP sessions  [] Reduced overall  [] Lethargic  [] Other:  Safety: [] No concerns at this time  [x] Reduced insight into deficits  [x]  Reduced safety awareness [] Not following call light procedures  [] Unable to Assess  [] Other:    Current Diet Order:ADULT DIET; Regular  ADULT ORAL NUTRITION SUPPLEMENT; Breakfast; Standard 4 oz Oral Supplement  Swallowing Precautions: Sit up for all meals and thereafter for 30 minutes, Eat with small bites (1/2 tsp; 1 tsp), and Alternate solids with liquids        Date: 2024      Tx session 1  0800 - 0830 Tx session 2  All tx needs met in session 1   Total Timed Code Min 30    Total Treatment Minutes 30    Individual Treatment Minutes 30    Group Treatment Minutes 0 0   Co-Treat Minutes 0 0   Variance/Reason:  N/A    Pain No

## 2024-07-24 NOTE — PROGRESS NOTES
Contact guard assist  Skilled Clinical Factors: from wc to RW , cues for sequencing  Bed<>chair     Stand Pivot:  Assistance Level: Minimal assistance  Skilled Clinical Factors: from EOB to wc and wc to recliner  Lateral transfer:     Car transfer:  Assistance Level: Stand by assist  Skilled Clinical Factors: with RW  Ambulation:  Surface: Level surface  Device: Rolling walker  Distance: 150 ft  Assistance Level: Stand by assist, Contact guard assist  Gait Deviations: Slow kunal, Decreased step length bilateral, Decreased weight shift bilateral, Decreased trunk rotation, Unsteady gait, Narrow base of support  Skilled Clinical Factors: whem ambulating 175 ft, pt searched and picked up 6 cones with RW for support, cGA and cues to reach within pacemaker precautions.  Stairs:     Curb:     Wheelchair:       Occupational therapy:   Feeding  Skilled Clinical Factors: to eat breakfast, including opening packages and lids  Grooming/Oral Hygiene  Assistance Level: Supervision  Skilled Clinical Factors: standing at sink for 1-2 minutes to wash hands  UE Bathing  Assistance Level: Set-up  LE Bathing  Assistance Level: Stand by assist  Skilled Clinical Factors: for brief standing to bathe/dry buttocks/groin  UE Dressing  Assistance Level: Set-up, Increased time to complete  Skilled Clinical Factors: able to doff/don shirt/bra without cues or assistance  LE Dressing  Assistance Level: Minimal assistance  Skilled Clinical Factors: assist to thread lagunas into brief/pants, able to thread BLEs into brief/pants and pull over hips with CGA for balance  Putting On/Taking Off Footwear  Assistance Level: Dependent  Skilled Clinical Factors: to don B CHANTE hose  Toileting  Assistance Level: Stand by assist  Skilled Clinical Factors: for balance to complete clothing management in stance, hygiene completed while seated    Speech therapy:    ADULT DIET; Regular  ADULT ORAL NUTRITION SUPPLEMENT; Breakfast; Standard 4 oz Oral

## 2024-07-24 NOTE — PLAN OF CARE
Patient remains free from falls and injuries.  Call light within reach.  Patient has non skid footwear on and is compliant with asking staff to help with transfers.  Patient has bed/chair alarms on at all times. Jeremias Nayak RN

## 2024-07-25 LAB
ANION GAP SERPL CALCULATED.3IONS-SCNC: 10 MMOL/L (ref 3–16)
BASOPHILS # BLD: 0.1 K/UL (ref 0–0.2)
BASOPHILS NFR BLD: 1.5 %
BUN SERPL-MCNC: 37 MG/DL (ref 7–20)
CALCIUM SERPL-MCNC: 8.8 MG/DL (ref 8.3–10.6)
CHLORIDE SERPL-SCNC: 103 MMOL/L (ref 99–110)
CO2 SERPL-SCNC: 25 MMOL/L (ref 21–32)
CREAT SERPL-MCNC: 1.2 MG/DL (ref 0.6–1.2)
DEPRECATED RDW RBC AUTO: 13.8 % (ref 12.4–15.4)
EOSINOPHIL # BLD: 0.3 K/UL (ref 0–0.6)
EOSINOPHIL NFR BLD: 5.1 %
GFR SERPLBLD CREATININE-BSD FMLA CKD-EPI: 41 ML/MIN/{1.73_M2}
GLUCOSE SERPL-MCNC: 99 MG/DL (ref 70–99)
HCT VFR BLD AUTO: 31.4 % (ref 36–48)
HGB BLD-MCNC: 10.4 G/DL (ref 12–16)
LYMPHOCYTES # BLD: 0.9 K/UL (ref 1–5.1)
LYMPHOCYTES NFR BLD: 16.2 %
MCH RBC QN AUTO: 31.5 PG (ref 26–34)
MCHC RBC AUTO-ENTMCNC: 33.2 G/DL (ref 31–36)
MCV RBC AUTO: 94.8 FL (ref 80–100)
MONOCYTES # BLD: 0.4 K/UL (ref 0–1.3)
MONOCYTES NFR BLD: 8.1 %
NEUTROPHILS # BLD: 3.8 K/UL (ref 1.7–7.7)
NEUTROPHILS NFR BLD: 69.1 %
PLATELET # BLD AUTO: 266 K/UL (ref 135–450)
PMV BLD AUTO: 7.7 FL (ref 5–10.5)
POTASSIUM SERPL-SCNC: 4.4 MMOL/L (ref 3.5–5.1)
RBC # BLD AUTO: 3.32 M/UL (ref 4–5.2)
SODIUM SERPL-SCNC: 138 MMOL/L (ref 136–145)
WBC # BLD AUTO: 5.5 K/UL (ref 4–11)

## 2024-07-25 PROCEDURE — 85025 COMPLETE CBC W/AUTO DIFF WBC: CPT

## 2024-07-25 PROCEDURE — 97530 THERAPEUTIC ACTIVITIES: CPT

## 2024-07-25 PROCEDURE — 6370000000 HC RX 637 (ALT 250 FOR IP): Performed by: STUDENT IN AN ORGANIZED HEALTH CARE EDUCATION/TRAINING PROGRAM

## 2024-07-25 PROCEDURE — 36415 COLL VENOUS BLD VENIPUNCTURE: CPT

## 2024-07-25 PROCEDURE — 97110 THERAPEUTIC EXERCISES: CPT

## 2024-07-25 PROCEDURE — 97129 THER IVNTJ 1ST 15 MIN: CPT

## 2024-07-25 PROCEDURE — 97116 GAIT TRAINING THERAPY: CPT

## 2024-07-25 PROCEDURE — 1280000000 HC REHAB R&B

## 2024-07-25 PROCEDURE — 97130 THER IVNTJ EA ADDL 15 MIN: CPT

## 2024-07-25 PROCEDURE — 80048 BASIC METABOLIC PNL TOTAL CA: CPT

## 2024-07-25 PROCEDURE — 51701 INSERT BLADDER CATHETER: CPT

## 2024-07-25 PROCEDURE — 6360000002 HC RX W HCPCS: Performed by: STUDENT IN AN ORGANIZED HEALTH CARE EDUCATION/TRAINING PROGRAM

## 2024-07-25 PROCEDURE — 97535 SELF CARE MNGMENT TRAINING: CPT

## 2024-07-25 PROCEDURE — 51798 US URINE CAPACITY MEASURE: CPT

## 2024-07-25 RX ORDER — AMLODIPINE BESYLATE 5 MG/1
5 TABLET ORAL DAILY
Status: DISCONTINUED | OUTPATIENT
Start: 2024-07-26 | End: 2024-07-25

## 2024-07-25 RX ORDER — AMLODIPINE BESYLATE 5 MG/1
5 TABLET ORAL DAILY
Status: DISCONTINUED | OUTPATIENT
Start: 2024-07-25 | End: 2024-07-26

## 2024-07-25 RX ADMIN — AMLODIPINE BESYLATE 5 MG: 5 TABLET ORAL at 10:04

## 2024-07-25 RX ADMIN — ATORVASTATIN CALCIUM 20 MG: 10 TABLET, FILM COATED ORAL at 07:18

## 2024-07-25 RX ADMIN — ENOXAPARIN SODIUM 30 MG: 100 INJECTION SUBCUTANEOUS at 07:18

## 2024-07-25 RX ADMIN — Medication 5 MG: at 20:22

## 2024-07-25 RX ADMIN — LEVOTHYROXINE SODIUM 125 MCG: 0.12 TABLET ORAL at 05:39

## 2024-07-25 RX ADMIN — ASPIRIN 81 MG: 81 TABLET, COATED ORAL at 07:18

## 2024-07-25 RX ADMIN — SENNOSIDES AND DOCUSATE SODIUM 2 TABLET: 50; 8.6 TABLET ORAL at 07:18

## 2024-07-25 RX ADMIN — HYDRALAZINE HYDROCHLORIDE 10 MG: 10 TABLET ORAL at 20:22

## 2024-07-25 RX ADMIN — ONDANSETRON 4 MG: 4 TABLET, ORALLY DISINTEGRATING ORAL at 18:20

## 2024-07-25 NOTE — PROGRESS NOTES
----- Message from Faith Pineda sent at 12/18/2023 10:28 AM CST -----  Contact: Self  [phone]  Would like to receive medical advice.    Would they like a call back or a response via [facility]:  call back    Additional information:  Calling to r/s missed  appt for device check.           Patient was assisted to bathroom to urinate.  Patient did urinate 150mL approximately and was then bladder scanned.  Bladder scan showed >513mL.  MD byrne notified for further instructions regarding possible lagunas placement. Jeremias Nayak RN

## 2024-07-25 NOTE — PROGRESS NOTES
Cunningham cath removed per order without complications. Patient tolerated very well. Fluids encouraged.

## 2024-07-25 NOTE — PROGRESS NOTES
Physical Therapy  Facility/Department: [facility]  Rehabilitation Physical Therapy Treatment Note    NAME: Sheyla Byrd  : 1926 (89 y.o.)  MRN: [id number]  CODE STATUS: Full Code    Date of Service: 24       Restrictions:  Restrictions/Precautions: Fall Risk, General Precautions, Surgical Protocols  Required Braces or Orthoses  Left Upper Extremity Brace/Splint: Sling  Left Upper Extremity Brace/Splint: Sling/swathe for 24 hrs post-pacemaker placement on 7/10/24  Position Activity Restriction  Sternal Precautions: No Pushing, No Pulling, 5# Lifting Restrictions  Sternal Precautions: on LUE only due to pacemaker placement on 7/10/24  Other position/activity restrictions: RUE IV, LUE pacemaker precautions, CHANTE hose     SUBJECTIVE  Subjective  Subjective: pt found in recliner  Pain: denies pain throughout          OBJECTIVE  Cognition  Overall Cognitive Status: Exceptions  Arousal/Alertness: Appropriate responses to stimuli  Following Commands: Follows one step commands with repetition  Attention Span: Appears intact  Memory: Decreased recall of precautions;Decreased short term memory;Impaired  Safety Judgement: Decreased awareness of need for assistance;Decreased awareness of need for safety;Impaired  Problem Solving: Assistance required to identify errors made;Assistance required to implement solutions;Decreased awareness of errors;Assistance required to generate solutions;Able to problem solve independently  Insights: Decreased awareness of deficits  Initiation: Requires cues for some  Sequencing: Requires cues for some  Orientation  Overall Orientation Status: Within Functional Limits  Orientation Level: Oriented to person;Oriented to place;Oriented to time;Oriented to situation    Functional Mobility  Balance  Sitting Balance: Modified independent   Standing Balance: Stand by assistance  Standing Balance  Activity: pt utilized commode at start of session. voided small amount of urine, requires

## 2024-07-25 NOTE — PROGRESS NOTES
Occupational Therapy  Facility/Department: [facility]  Rehabilitation Occupational Therapy Daily Treatment Note    Date: 24  Patient Name: Sheyla Byrd       Room: 0154/0154-01  MRN: [id number]  Account: [id number]   : 1926  (89 y.o.) Gender: female                    Past Medical History:  has a past medical history of Amaurosis fugax of left eye, Benign neoplasm, Cancer of kidney (HCC), Colonic polyp, Cyst of ovary, left, Dehydration, Diverticulosis of colon, Dyslipidemia, GERD (gastroesophageal reflux disease), Glaucoma, Hyperlipidemia, Hypertension, IBS (irritable bowel syndrome), Osteoporosis, Other specified gastritis without mention of hemorrhage, and Pericardial effusion.  Past Surgical History:   has a past surgical history that includes Upper gastrointestinal endoscopy (1998); Colonoscopy (2009); Upper gastrointestinal endoscopy (09/10/1999); Colonoscopy (2001); Upper gastrointestinal endoscopy (2001); Upper gastrointestinal endoscopy (2004); Colonoscopy (2006); Upper gastrointestinal endoscopy (2007); partial nephrectomy (2010); Cholecystectomy, laparoscopic (1998); bronchoscopy (N/A, 2019); bronchoscopy (N/A, 2019); bronchoscopy (2019); and ep device procedure (N/A, 7/10/2024).    Restrictions  Restrictions/Precautions: Fall Risk, General Precautions, Surgical Protocols  Implants present? : Pacemaker  Sternal Precautions: on LUE only due to pacemaker placement on 7/10/24  Other position/activity restrictions: RUE IV, LUE pacemaker precautions, CHANTE hose  Required Braces or Orthoses  Left Upper Extremity Brace/Splint: Sling  Left Upper Extremity Brace/Splint: Sling/swathe for 24 hrs post-pacemaker placement on 7/10/24  Required Braces or Orthoses?: No  Equipment Used: Bed, Wheelchair    Subjective  Subjective: Pt in recliner, pleasant, no pain, agreeable to OT session, already dressed and declining shower this date, BP

## 2024-07-25 NOTE — PROGRESS NOTES
[facility]: ACUTE REHAB UNIT  SPEECH-LANGUAGE PATHOLOGY      [x] Daily  [] Weekly Care Conference Note  [] Discharge    Patient:Sheyla Byrd      :1926  MRN:[id number]  Rehab Dx/Hx: Bradycardia [R00.1]    Precautions: N/A  Home situation: Pt lives with daughter, pt independently manages finances and medications; pt currently drives  ST Dx: [] Aphasia  [] Dysarthria  [] Apraxia   [] Oropharyngeal dysphagia [x] Cognitive Impairment  [] Other:   Date of Admit: 2024  Room #: 0154/0154-01    Current functional status (updated daily):         Pt being seen for : [] Speech/Language Treatment  [] Dysphagia Treatment [x] Cognitive Treatment  [] Other:  Communication: [x]WFL  [] Aphasia  [] Dysarthria  [] Apraxia  [] Pragmatic Impairment [] Non-verbal  [] Hearing Loss  [] Other:   Cognition: [] WFL  [x] Mild  [x] Moderate  [] Severe [] Unable to Assess  [] Other:  Memory: [] WFL  [x] Mild  [x] Moderate  [] Severe [] Unable to Assess  [] Other:  Behavior: [x] Alert  [x] Cooperative  []  Pleasant  [] Confused  [] Agitated  [] Uncooperative  [] Distractible [] Motivated  [] Self-Limiting [] Anxious  [] Other:  Endurance:  [x] Adequate for participation in SLP sessions  [] Reduced overall  [] Lethargic  [] Other:  Safety: [] No concerns at this time  [x] Reduced insight into deficits  [x]  Reduced safety awareness [] Not following call light procedures  [] Unable to Assess  [] Other:    Current Diet Order:ADULT DIET; Regular  ADULT ORAL NUTRITION SUPPLEMENT; Breakfast; Standard 4 oz Oral Supplement  Swallowing Precautions: Sit up for all meals and thereafter for 30 minutes, Eat with small bites (1/2 tsp; 1 tsp), and Alternate solids with liquids        Date: 2024      Tx session 1  0800 - 0830 Tx session 2  All tx needs met in session 1   Total Timed Code Min 30    Total Treatment Minutes 30    Individual Treatment Minutes 30    Group Treatment Minutes 0 0   Co-Treat Minutes 0 0   Variance/Reason:  N/A    Pain No

## 2024-07-25 NOTE — PLAN OF CARE
Patient remains free from falls and injuries.  Call light within reach.  Patient has non skid footwear on and has been compliant with asking staff to help with transfers.  Patient has bed/chair alarms on. Jeremias Nayak RN

## 2024-07-25 NOTE — PLAN OF CARE
Problem: Safety - Adult  Goal: Free from fall injury  7/24/2024 2229 by Mirtha Bean, RN  Outcome: Progressing

## 2024-07-25 NOTE — CARE COORDINATION
Cm update; LOS # 11; Patient given a copy of DME list not covered with [facility] [employer] [facility] [employer]. Will follow.Ade Diez RN

## 2024-07-25 NOTE — PROGRESS NOTES
Modified independent  Supine>sit:  Assistance Level: Supervision, Modified independent  Skilled Clinical Factors: with HOB elevated, cues for precautions  Transfers:     Sit>stand:  Assistance Level: Contact guard assist, Stand by assist  Skilled Clinical Factors: from wc and EOB to RW  Stand>sit:  Assistance Level: Stand by assist, Contact guard assist  Skilled Clinical Factors: from wc to RW , cues for sequencing  Bed<>chair     Stand Pivot:  Assistance Level: Minimal assistance  Skilled Clinical Factors: from EOB to wc and wc to recliner  Lateral transfer:     Car transfer:  Assistance Level: Stand by assist  Skilled Clinical Factors: with RW  Ambulation:  Surface: Level surface  Device: Rolling walker  Distance: 150 ft  Assistance Level: Stand by assist, Contact guard assist  Gait Deviations: Slow kunal, Decreased step length bilateral, Decreased weight shift bilateral, Decreased trunk rotation, Unsteady gait, Narrow base of support  Skilled Clinical Factors: whem ambulating 175 ft, pt searched and picked up 6 cones with RW for support, cGA and cues to reach within pacemaker precautions.  Stairs:     Curb:     Wheelchair:       Occupational therapy:   Feeding  Skilled Clinical Factors: to eat breakfast, including opening packages and lids  Grooming/Oral Hygiene  Assistance Level: Supervision  Skilled Clinical Factors: standing at sink for 1-2 minutes to wash hands  UE Bathing  Assistance Level: Set-up  LE Bathing  Assistance Level: Stand by assist  Skilled Clinical Factors: for brief standing to bathe/dry buttocks/groin  UE Dressing  Assistance Level: Set-up, Increased time to complete  Skilled Clinical Factors: able to doff/don shirt/bra without cues or assistance  LE Dressing  Assistance Level: Minimal assistance  Skilled Clinical Factors: assist to thread lagunas into brief/pants, able to thread BLEs into brief/pants and pull over hips with CGA for balance  Putting On/Taking Off Footwear  Assistance Level:

## 2024-07-26 ENCOUNTER — PROCEDURE VISIT (OUTPATIENT)
Dept: CARDIOLOGY CLINIC | Age: 89
End: 2024-07-26

## 2024-07-26 DIAGNOSIS — Z95.0 PACEMAKER: Primary | ICD-10-CM

## 2024-07-26 DIAGNOSIS — R00.1 BRADYCARDIA: ICD-10-CM

## 2024-07-26 PROCEDURE — 6370000000 HC RX 637 (ALT 250 FOR IP): Performed by: STUDENT IN AN ORGANIZED HEALTH CARE EDUCATION/TRAINING PROGRAM

## 2024-07-26 PROCEDURE — 97110 THERAPEUTIC EXERCISES: CPT

## 2024-07-26 PROCEDURE — 97130 THER IVNTJ EA ADDL 15 MIN: CPT

## 2024-07-26 PROCEDURE — 97530 THERAPEUTIC ACTIVITIES: CPT

## 2024-07-26 PROCEDURE — 51798 US URINE CAPACITY MEASURE: CPT

## 2024-07-26 PROCEDURE — 97129 THER IVNTJ 1ST 15 MIN: CPT

## 2024-07-26 PROCEDURE — 97116 GAIT TRAINING THERAPY: CPT

## 2024-07-26 PROCEDURE — 1280000000 HC REHAB R&B

## 2024-07-26 PROCEDURE — 97535 SELF CARE MNGMENT TRAINING: CPT

## 2024-07-26 PROCEDURE — 6360000002 HC RX W HCPCS: Performed by: STUDENT IN AN ORGANIZED HEALTH CARE EDUCATION/TRAINING PROGRAM

## 2024-07-26 RX ORDER — AMLODIPINE BESYLATE 5 MG/1
5 TABLET ORAL ONCE
Status: COMPLETED | OUTPATIENT
Start: 2024-07-26 | End: 2024-07-26

## 2024-07-26 RX ORDER — AMLODIPINE BESYLATE 5 MG/1
10 TABLET ORAL DAILY
Status: DISCONTINUED | OUTPATIENT
Start: 2024-07-27 | End: 2024-07-27 | Stop reason: HOSPADM

## 2024-07-26 RX ORDER — AMLODIPINE BESYLATE 10 MG/1
10 TABLET ORAL DAILY
Qty: 30 TABLET | Refills: 1 | Status: SHIPPED | OUTPATIENT
Start: 2024-07-27

## 2024-07-26 RX ORDER — SENNA AND DOCUSATE SODIUM 50; 8.6 MG/1; MG/1
2 TABLET, FILM COATED ORAL DAILY
Qty: 60 TABLET | Refills: 1 | Status: SHIPPED | OUTPATIENT
Start: 2024-07-26

## 2024-07-26 RX ADMIN — ATORVASTATIN CALCIUM 20 MG: 10 TABLET, FILM COATED ORAL at 10:13

## 2024-07-26 RX ADMIN — SENNOSIDES AND DOCUSATE SODIUM 2 TABLET: 50; 8.6 TABLET ORAL at 10:13

## 2024-07-26 RX ADMIN — AMLODIPINE BESYLATE 5 MG: 5 TABLET ORAL at 10:15

## 2024-07-26 RX ADMIN — ONDANSETRON 4 MG: 4 TABLET, ORALLY DISINTEGRATING ORAL at 11:07

## 2024-07-26 RX ADMIN — Medication 5 MG: at 20:15

## 2024-07-26 RX ADMIN — ENOXAPARIN SODIUM 30 MG: 100 INJECTION SUBCUTANEOUS at 10:15

## 2024-07-26 RX ADMIN — AMLODIPINE BESYLATE 5 MG: 5 TABLET ORAL at 08:50

## 2024-07-26 RX ADMIN — ASPIRIN 81 MG: 81 TABLET, COATED ORAL at 10:15

## 2024-07-26 RX ADMIN — LEVOTHYROXINE SODIUM 125 MCG: 0.12 TABLET ORAL at 06:06

## 2024-07-26 NOTE — CARE COORDINATION
Cm update; LOS # 12; Spoke with daughter this Am she will be taking patient home with [facility] tomorrow. Anna with [facility] will follow.Ade Diez RN

## 2024-07-26 NOTE — PROGRESS NOTES
Occupational Therapy  Occupational Therapy  Discharge Summary     Name:Sheyla Byrd  MRN:[id number]  :1926  Treatment Diagnosis: Decreased strength and (I) with functional mobility  Diagnosis: Bradycardia with transient L BBB (scheduled for pacemaker 7/10/24)    Restrictions/Precautions  Restrictions/Precautions: Fall Risk, General Precautions, Surgical Protocols  Required Braces or Orthoses?: No  Implants present? : Pacemaker           Position Activity Restriction  Sternal Precautions: No Pushing, No Pulling, 5# Lifting Restrictions  Sternal Precautions: on LUE only due to pacemaker placement on 7/10/24  Other position/activity restrictions: RUE IV, LUE pacemaker precautions, CHANTE hose     Goals:   Patient Goals   Patient goals : \"get out of here...be able to stand up and walk around\"  Short Term Goals  Time Frame for Short Term Goals: 6 days- 24  Short Term Goal 1: Pt will complete toileting with max A. GOAL MET 24 Pt completed toileting with CGA.  Short Term Goal 2: Pt will perform functional transfers with min A. GOAL MET 24 Pt performed functional transfers with CGA.  Short Term Goal 3: Pt will complete UB ADLs with SPV. GOAL MET 24 Pt completed UB ADLs with SPV.  Short Term Goal 4: Pt will complete LB dressing with min A. GOAL MET 24 Pt completed LB dressing with min A.  Short Term Goal 5: Pt will perform full body bathing with min A. GOAL MET 24 Pt performed full body bathing with CGA.  Long Term Goals  Time Frame for Long Term Goals : 14 days- 24  Long Term Goal 1: Pt will complete functional transfers with CGA. GOAL MET 24 Pt performed functional transfers with SBA.  Long Term Goal 2: Pt will complete toileting with CGA. GOAL MET 24 Pt completed toileting with CGA.  Long Term Goal 3: Pt will perform LB dressing with CGA. - goal met   Long Term Goal 4: Pt will complete full body bathing with CGA. - goal met     Pt. Met / short term goals and

## 2024-07-26 NOTE — PROGRESS NOTES
2020: Patient ambulated to commode for attempt at voiding. Patient was able to void approximately 300ml of clear, yellow urine. Post void Bladder scan performed and apprx 600ml was seen. Discussed attempting to void again, patient verbalized will try within an hour or so.   2200: Patient ambulated to toilet for attempt at voiding, urge incontinence noted and additional 300ml clear, yellow urine in commode. Bladder scanned greater than 650ml. Discussed with patient orders for straight cath, patient verbalized understanding. Intermittent straight cath performed with no adverse effects. Approximately 700ml of clear, yellow, odorless urine drained.

## 2024-07-26 NOTE — PLAN OF CARE
Patient s/p pacemaker implant on 7/10/2024. Steri-strips removed from implant site. Incision well approximated without swelling, erythema or drainage. Okay for discharge. Follow up as planned.     GERRY Dumont-CNP  [facility]  [phone]

## 2024-07-26 NOTE — PROGRESS NOTES
[facility]: ACUTE REHAB UNIT  SPEECH-LANGUAGE PATHOLOGY      [x] Daily  [] Weekly Care Conference Note  [x] Discharge    Patient:Sheyla Byrd      :1926  MRN:[id number]  Rehab Dx/Hx: Bradycardia [R00.1]    Precautions: N/A  Home situation: Pt lives with daughter, pt independently manages finances and medications; pt currently drives  ST Dx: [] Aphasia  [] Dysarthria  [] Apraxia   [] Oropharyngeal dysphagia [x] Cognitive Impairment  [] Other:   Date of Admit: 2024  Room #: 0154/0154-01    Current functional status (updated daily):         Pt being seen for : [] Speech/Language Treatment  [] Dysphagia Treatment [x] Cognitive Treatment  [] Other:  Communication: [x]WFL  [] Aphasia  [] Dysarthria  [] Apraxia  [] Pragmatic Impairment [] Non-verbal  [] Hearing Loss  [] Other:   Cognition: [] WFL  [x] Mild  [x] Moderate  [] Severe [] Unable to Assess  [] Other:  Memory: [] WFL  [x] Mild  [x] Moderate  [] Severe [] Unable to Assess  [] Other:  Behavior: [x] Alert  [x] Cooperative  []  Pleasant  [] Confused  [] Agitated  [] Uncooperative  [] Distractible [] Motivated  [] Self-Limiting [] Anxious  [] Other:  Endurance:  [x] Adequate for participation in SLP sessions  [] Reduced overall  [] Lethargic  [] Other:  Safety: [] No concerns at this time  [x] Reduced insight into deficits  [x]  Reduced safety awareness [] Not following call light procedures  [] Unable to Assess  [] Other:    Current Diet Order:ADULT DIET; Regular  ADULT ORAL NUTRITION SUPPLEMENT; Breakfast; Standard 4 oz Oral Supplement  Swallowing Precautions: Sit up for all meals and thereafter for 30 minutes, Eat with small bites (1/2 tsp; 1 tsp), and Alternate solids with liquids        Date: 2024       Tx session 1  0800 - 0830 Tx session 2  All tx needs met in session 1 Discharge Summary   Total Timed Code Min 30     Total Treatment Minutes 30     Individual Treatment Minutes 30     Group Treatment Minutes 0 0    Co-Treat Minutes 0 0

## 2024-07-26 NOTE — PROGRESS NOTES
devices: Left in chair;Nurse notified;Gait belt;Call light within reach;Chair alarm in place    Patient Education  Education  Education Given To: Patient  Education Provided: Role of Therapy;Mobility Training;Plan of Care;Transfer Training;Precautions;Cognition;Safety;ADL Function;Equipment;DME/Home Modifications;Energy Conservation;Fall Prevention Strategies;Family Education  Education Provided Comments: role of OT, pacemaker precautions, sit<>stands, BUE ther ex,  Education Method: Demonstration;Verbal  Barriers to Learning: Hearing;Cognition  Education Outcome: Verbalized understanding;Continued education needed    Plan  Occupational Therapy Plan  Times Per Week: 5-7x  Current Treatment Recommendations: Balance training;Functional mobility training;Endurance training;Strengthening;Self-Care / ADL;Safety education & training;Equipment evaluation, education, & procurement;Patient/Caregiver education & training;Positioning;Home management training;Coordination training;ROM    Goals  Patient Goals   Patient goals : \"get out of here...be able to stand up and walk around\"  Short Term Goals  Time Frame for Short Term Goals: 6 days- 7/19/24  Short Term Goal 1: Pt will complete toileting with max A. GOAL MET 7/18/24 Pt completed toileting with CGA.  Short Term Goal 2: Pt will perform functional transfers with min A. GOAL MET 7/18/24 Pt performed functional transfers with CGA.  Short Term Goal 3: Pt will complete UB ADLs with SPV. GOAL MET 7/22/24 Pt completed UB ADLs with SPV.  Short Term Goal 4: Pt will complete LB dressing with min A. GOAL MET 7/17/24 Pt completed LB dressing with min A.  Short Term Goal 5: Pt will perform full body bathing with min A. GOAL MET 7/22/24 Pt performed full body bathing with CGA.  Long Term Goals  Time Frame for Long Term Goals : 14 days- 7/27/24  Long Term Goal 1: Pt will complete functional transfers with CGA. GOAL MET 7/24/24 Pt performed functional transfers with SBA.  Long Term Goal 2:

## 2024-07-26 NOTE — DISCHARGE INSTR - COC
Risk of Unplanned Readmission:  16           Discharging to Facility/ Agency   [facility]  Services: Home Health Services   Address: [address], [facility] [address]   Phone: [phone]       / signature: Electronically signed by Ade Diez RN on 7/26/24 at 12:50 PM EDT    PHYSICIAN SECTION    Prognosis: Good    Condition at Discharge: Stable    Rehab Potential (if transferring to Rehab): Good    Recommended Labs or Other Treatments After Discharge:   - continue PT and OT  - Follow up with Cardiology, Urology, and your family doctor    Physician Certification: I certify the above information and transfer of Sheyla Byrd  is necessary for the continuing treatment of the diagnosis listed and that she requires Home Care for less 30 days.     Update Admission H&P: No change in H&P    PHYSICIAN SIGNATURE:  Electronically signed by Jenny Xie MD on 7/26/24 at 9:43 AM EDT

## 2024-07-26 NOTE — CARE COORDINATION
Case Management Discharge Summary  [facility] - Acute Rehab Unit    Patient:Sheyla Byrd     Rehab Dx/Hx: Bradycardia [R00.1]       Today's Date: 7/27/2024    Discharge to:  Home with daughter and [facility]   Pre-certification completed:  [x] No       [] Yes     [] N/A   Hospital Exemption Notification (HENS) completed:  [x] No       [] Yes     [] N/A   IMM given:  7/26/24       New Durable Medical Equipment ordered/agency:  Shower chair ; Tub transfer bench( not covered by insurance ordering information printed for patient and suggestions of [facility] and/or Lisa for purchase)   Transportation:  Transportation method: Private car/ family   time:1100  Ambulance form completed: [] No       [] Yes   [x] N/A       Confirmed discharge plan with:  Patient: [] No       [x] Yes  Family:  [] No       [x] Yes      Name:Ruth Ann  Contact number: [phone]  Facility/Agency, name: [facility] for HHC needs, they can pull orders from Epic   FAIZAN/AVS does not need to be faxed    RN, name: Isabella       Has lack of transportation kept you from medical appointments, meetings, work, or ADL's? [] Yes, it has kept me from medical appointments or from getting my meds  [] Yes, It has kept me from non-medical meetings, appointments, work, or getting things I need  [x] No  [] Pt unable to respond  [] Pt declines to respond     How often do you need to have someone help you when you read instructions, pamphlets, or other written material from your doctor or pharmacy? [] Never                             [] Always  [] Rarely                            [] Patient declines to respond  [] Sometimes                    [] Patient unable to respond  [x] Often       Note: Discharging nurse to complete FAIZAN, reconcile AVS, and place final copy with patient's discharge packet. RN to ensure that written prescriptions for  Level II medications are sent with patient to the facility as per protocol.

## 2024-07-26 NOTE — PROGRESS NOTES
Sheyla Byrd  7/26/2024  [id number]    Chief Complaint: Bradycardia    Subjective:   No acute events overnight. Patient consistently retaining urine and reporting abdominal discomfort. Nursing discussed with Urology, plan to replace lagunas and follow up outpatient. Nursing also reaching out to EP about discharge tomorrow. Today Sheyla reports feeling ok. She denies acute complaints at this time. BP has remained high despite resuming amlodipine. Will increase back to home dose of 10 mg.     Reviewed labs.     ROS: denies f/c, n/v, cp, sob    Objective:  Patient Vitals for the past 24 hrs:   BP Temp Temp src Pulse Resp SpO2   07/26/24 0856 (!) 170/72 -- -- 77 -- 94 %   07/26/24 0845 (!) 170/72 -- -- -- -- --   07/25/24 2130 (!) 174/78 -- -- -- -- --   07/25/24 2000 (!) 182/76 98 °F (36.7 °C) Oral 76 16 92 %   07/25/24 1323 (!) 141/87 -- -- 75 -- 94 %   07/25/24 1000 (!) 167/78 -- -- 83 -- 96 %     Gen: No distress, pleasant.   HEENT: Normocephalic, atraumatic.   CV: RRR  Resp: No respiratory distress. Lungs CTAB  Abd: Soft, nondistended  Ext: no long bone deformities  Neuro: Alert, oriented, appropriately interactive. Speech fluent  Psych: appropriate mood and affect  Skin: steri strips in place    Wt Readings from Last 3 Encounters:   07/22/24 59.5 kg (131 lb 3.2 oz)   07/10/24 56 kg (123 lb 7.3 oz)   12/07/21 57.2 kg (126 lb)       Laboratory data:   Lab Results   Component Value Date    WBC 5.5 07/25/2024    HGB 10.4 (L) 07/25/2024    HCT 31.4 (L) 07/25/2024    MCV 94.8 07/25/2024     07/25/2024       Lab Results   Component Value Date/Time     07/25/2024 05:38 AM    K 4.4 07/25/2024 05:38 AM     07/25/2024 05:38 AM    CO2 25 07/25/2024 05:38 AM    BUN 37 07/25/2024 05:38 AM    CREATININE 1.2 07/25/2024 05:38 AM    GLUCOSE 99 07/25/2024 05:38 AM    CALCIUM 8.8 07/25/2024 05:38 AM        Therapy progress:  Physical therapy:  Bed Mobility:     Sit>supine:  Assistance Level: Modified

## 2024-07-26 NOTE — PROGRESS NOTES
Physical Therapy  Facility/Department: [facility]  Rehabilitation Physical Therapy Treatment Note    NAME: Sheyla Byrd  : 1926 (89 y.o.)  MRN: [id number]  CODE STATUS: Full Code    Date of Service: 24       Restrictions:  Restrictions/Precautions: Fall Risk, General Precautions, Surgical Protocols  Required Braces or Orthoses  Left Upper Extremity Brace/Splint: Sling  Left Upper Extremity Brace/Splint: Sling/swathe for 24 hrs post-pacemaker placement on 7/10/24  Position Activity Restriction  Sternal Precautions: No Pushing, No Pulling, 5# Lifting Restrictions  Sternal Precautions: on LUE only due to pacemaker placement on 7/10/24  Other position/activity restrictions: RUE IV, LUE pacemaker precautions, CHANTE hose     SUBJECTIVE  Subjective  Subjective: pt found in bed, requires assist to dress  Pain: no pain indicated during session        OBJECTIVE  Cognition  Overall Cognitive Status: Exceptions  Arousal/Alertness: Appropriate responses to stimuli  Following Commands: Follows one step commands with repetition  Attention Span: Appears intact  Memory: Decreased recall of precautions;Decreased short term memory;Impaired  Safety Judgement: Decreased awareness of need for assistance;Decreased awareness of need for safety;Impaired  Problem Solving: Assistance required to identify errors made;Assistance required to implement solutions;Decreased awareness of errors;Assistance required to generate solutions;Able to problem solve independently  Insights: Decreased awareness of deficits  Initiation: Requires cues for some  Sequencing: Requires cues for some  Orientation  Overall Orientation Status: Within Functional Limits  Orientation Level: Oriented to person;Oriented to place;Oriented to time;Oriented to situation    Functional Mobility  Roll Left  Assistance Level: Modified independent  Roll Right  Assistance Level: Modified independent  Sit to Supine  Assistance Level: Modified independent  Supine to

## 2024-07-26 NOTE — PROGRESS NOTES
Physical Therapy  Discharge Summary    Name:Sheyla Byrd  MRN:[id number]  :1926  Treatment Diagnosis: Decreased strength and (I) with functional mobility  Diagnosis: Bradycardia with transient L BBB (scheduled for pacemaker 7/10/24)    Restrictions/Precautions  Restrictions/Precautions: Fall Risk, General Precautions, Surgical Protocols  Required Braces or Orthoses?: No  Implants present? : Pacemaker           Position Activity Restriction  Sternal Precautions: No Pushing, No Pulling, 5# Lifting Restrictions  Sternal Precautions: on LUE only due to pacemaker placement on 7/10/24  Other position/activity restrictions: RUE IV, LUE pacemaker precautions, CHANTE hose     Goals:                  Short Term Goals  Time Frame for Short Term Goals: 9 days   Short Term Goal 1: pt will complete bed mobility with min A- GOAL MET  Short Term Goal 2: pt will complete functional transfers with min A- GOAL MET  Short Term Goal 3: pt will ambulate 15 ft with LRAD and min A- GOAL MET            Long Term Goals  Time Frame for Long Term Goals : 14 days   Long Term Goal 1: pt will complete bed mobility with SBA- GOAL MET  Long Term Goal 2: pt will complete functional transfer with CGA and LRAd- GOAL MET  Long Term Goal 3: pt will ambulate 50 ft with LRAD and CGA- GOAL MET  Long Term Goal 4: pt will  object from floor with LRAD and SBA- GoAL MET  Long Term Goal 5: pt will complete car transfer with LRAD and CGA- GOAL MET    Pt. Met 3/3 short term goals and 5/5 long term goals.     Pt. Currently ambulates up to 150 feet with Rw and CGA  Up/down 4 steps with min A  Up/down curb step with min a and RW  Sit to/from stand with CGA-SBa  Bed mobility with mod ind  Recommend HHPT in order to increase endurance/safety with household mobility  Pt. Safe to return home with assistance from family . Dtr participated in family training and state/demos ability to assist with transfers/gait with RW   Provided pt. with

## 2024-07-27 VITALS
HEIGHT: 60 IN | TEMPERATURE: 98.5 F | HEART RATE: 81 BPM | DIASTOLIC BLOOD PRESSURE: 63 MMHG | SYSTOLIC BLOOD PRESSURE: 141 MMHG | BODY MASS INDEX: 25.76 KG/M2 | RESPIRATION RATE: 18 BRPM | OXYGEN SATURATION: 93 % | WEIGHT: 131.2 LBS

## 2024-07-27 PROCEDURE — 6370000000 HC RX 637 (ALT 250 FOR IP): Performed by: STUDENT IN AN ORGANIZED HEALTH CARE EDUCATION/TRAINING PROGRAM

## 2024-07-27 PROCEDURE — 6360000002 HC RX W HCPCS: Performed by: STUDENT IN AN ORGANIZED HEALTH CARE EDUCATION/TRAINING PROGRAM

## 2024-07-27 RX ADMIN — ENOXAPARIN SODIUM 30 MG: 100 INJECTION SUBCUTANEOUS at 09:19

## 2024-07-27 RX ADMIN — LEVOTHYROXINE SODIUM 125 MCG: 0.12 TABLET ORAL at 06:51

## 2024-07-27 RX ADMIN — ATORVASTATIN CALCIUM 20 MG: 10 TABLET, FILM COATED ORAL at 09:19

## 2024-07-27 RX ADMIN — ASPIRIN 81 MG: 81 TABLET, COATED ORAL at 09:19

## 2024-07-27 RX ADMIN — AMLODIPINE BESYLATE 10 MG: 5 TABLET ORAL at 09:19

## 2024-07-27 NOTE — PROGRESS NOTES
IRF JAN Discharge Assessment  [facility] Acute Rehab Unit    Patient:Sheyla Byrd     Rehab Dx/Hx: Bradycardia [R00.1]   :1926  MRN:[id number]  Date of Admit: 2024     Pain Effect on Sleep:  Over the past 5 days, how much of the time has pain made it hard for you to sleep at night?  []  0. Does not apply - I have not had any pain or hurting in the past 5 days  [x]  1. Rarely or not at all  []  2. Occasionally  []  3. Frequently  []  4. Almost constantly  []  8. Unable to answer    Over the past 5 days, how often have you limited your participation in rehabilitation therapy sessions due to pain?  []  0. Does not apply - I have not received rehabilitation therapy in the past 5 days  [x]  1. Rarely or not at all  []  2. Occasionally  []  3. Frequently  []  4. Almost constantly  []  8. Unable to answer    Pain Interference with Day-to-Day Activities:  Over the past 5 days, how often have you limited your day-to-day activities (excluding rehabilitation therapy session)?  [x]  1. Rarely or not at all  []  2. Occasionally  []  3. Frequently  []  4. Almost constantly  []  8. Unable to answer      High-Risk Drug Classes: Use and Indication   Is taking: Check if the pt is taking any medications by pharmacological classification  Indication noted: If column 1 is checked, check if there is an indication noted for all meds in the drug class Is taking  (check all that apply) Indication noted (check all that apply)   Antipsychotic [] []   Anticoagulant [] []   Antibiotic [] []   Opioid [] []   Antiplatelet [x] [x]   Hypoglycemic (including insulin) [] []   None of the above [] []     Special Treatments, Procedures, and Programs   Check all of the following treatments, procedures, and programs that apply on discharge.  On discharge (check all that apply)   Cancer Treatments    A1. Chemotherapy []   A2. IV []   A3. Oral []   A10. Other []   B1. Radiation []   Respiratory Therapies    C1. Oxygen Therapy []   C2.

## 2024-07-27 NOTE — PROGRESS NOTES
report correct day of the week:  []  0. Incorrect or no answer  [x]  1. Correct    Recall:   Able to recall \"sock\":  []  0. No could not recall  []  1. Yes, after cueing  [x]  2. Yes, no cue required  Able to recall \"blue\":  []  0. No could not recall  []  1. Yes, after cueing  [x]  2. Yes, no cue required  Able to recall \"bed\":  []  0. No could not recall  []  1. Yes, after cueing  [x]  2. Yes, no cue required      Patient Mood Interview0    Symptom Presence  0. No (enter 0 in column 2)  1. Yes (enter 0-3 in column 2)  9. No response (leave column 2 blank  Symptom Frequency  0. Never or 1 day  1. 2-6 days (several days)  2. 7-11 days (half or more days)  3. 12-14 days (nearly everyday) 1. Symptom Presence 2. Symptom Frequency    Enter scores in boxes   Little interest or pleasure in doing things   0 0   Feeling down, depressed, or hopeless   0 0   If either A or B is coded 2 or 3, CONTINUE asking the questions below.  If not, END the interview.     Trouble falling or staying asleep, or sleeping too much       Feeling tired or having little energy       Poor appetite or overeating       Feeling bad about yourself - or that you are a failure or have let yourself or your family down       Trouble concentrating on things, such as reading the newspaper or watching television       Moving or speaking so slowly that other people could have noticed.  Or the opposite- being so fidgety or restless that you have been moving around a lot more than usual.       Thoughts that you would be better off dead, or of hurting yourself in some way.       Total Severity: Add scores for all frequency responses in column 2    0   Social isolation (from [place])  How often do you feel lonely or isolated from those around you?  [x] 0. Never  [] 1. Rarely  [] 2. Sometimes  [] 3. Often  [] 4. Always  [] 7. Patient declines to respond  [] 8. Patient unable to respond.           Discharging Nurse Signature:  Maribel MIX RN

## 2024-10-04 NOTE — PROGRESS NOTES
creatinine ingestion, or following  therapy that affects renal tubular secretion.       Glucose   Date Value Ref Range Status   07/25/2024 99 70 - 99 mg/dL Final   07/22/2024 98 70 - 99 mg/dL Final   07/19/2024 144 (H) 70 - 99 mg/dL Final     PT/INR:   Protime   Date Value Ref Range Status   07/09/2024 13.5 11.9 - 14.9 sec Final     Comment:     Effective 4-3-24 10:00am EST  Please note reference ranges have  changed for PT and INR Testing.     09/09/2019 11.3 9.8 - 13.0 sec Final     Comment:     Effective 5-31-18 09:00am EST  Please note reference ranges have  changed for PT and INR Testing.     12/11/2013 11.8 10.0 - 12.8 sec Final     INR   Date Value Ref Range Status   07/09/2024 1.01 0.85 - 1.15 Final     Comment:     Effective 4/3/24 at 10:00am EST    Normal: 0.85 - 1.15  Therapeutic: 2.0 - 3.0  Pros. Valve: 2.5 - 3.5  AMI: 2.0 - 3.0     09/09/2019 0.99 0.86 - 1.14 Final     Comment:     Effective 05/31/18 at 09:00am EST    Normal: 0.86 - 1.14  Therapeutic: 2.0 - 3.0  Pros. Valve: 2.5 - 3.5  AMI: 2.0 - 3.0     12/11/2013 1.06 0.85 - 1.15 Final     Comment:     Normal: 0.85 - 1.15  Therapeutic: 2.0 - 3.0  Pros. Valve: 2.5 - 3.5     APTT: No components found for: \"PT2T\"  FASTING LIPID PANEL:   Lab Results   Component Value Date/Time    HDL 55 07/09/2024 06:18 PM    HDL 71 03/16/2011 05:59 AM    TRIG 113 07/09/2024 06:18 PM     LIVER PROFILE:No results for input(s): \"AST\", \"ALT\" in the last 72 hours.    Invalid input(s): \"ALB\"    IMPRESSION:    Patient Active Problem List   Diagnosis    HTN (hypertension)    Hyponatremia    Hypokalemia    Light headed    Generalized weakness    Colitis    Pneumonia    Acute respiratory failure with hypoxia    Pulmonary infiltrates    HH (hiatus hernia)    Restrictive lung disease    Chest congestion    Diastolic dysfunction    Chills    Hypothyroidism    Pericardial effusion    Arrhythmia    Bradycardia    Pacemaker-[employer] DC LEONARDO implant 7/10/24-MRI COMPATIBLE

## 2024-10-09 ENCOUNTER — NURSE ONLY (OUTPATIENT)
Dept: CARDIOLOGY CLINIC | Age: 89
End: 2024-10-09

## 2024-10-09 ENCOUNTER — OFFICE VISIT (OUTPATIENT)
Dept: CARDIOLOGY CLINIC | Age: 89
End: 2024-10-09
Payer: MEDICARE

## 2024-10-09 VITALS
OXYGEN SATURATION: 96 % | WEIGHT: 132 LBS | HEART RATE: 76 BPM | SYSTOLIC BLOOD PRESSURE: 134 MMHG | BODY MASS INDEX: 25.91 KG/M2 | HEIGHT: 60 IN | DIASTOLIC BLOOD PRESSURE: 82 MMHG

## 2024-10-09 DIAGNOSIS — Z95.0 PACEMAKER: Primary | ICD-10-CM

## 2024-10-09 DIAGNOSIS — R00.1 BRADYCARDIA: Primary | ICD-10-CM

## 2024-10-09 DIAGNOSIS — R00.1 BRADYCARDIA: ICD-10-CM

## 2024-10-09 PROCEDURE — 93000 ELECTROCARDIOGRAM COMPLETE: CPT

## 2024-10-09 PROCEDURE — 1036F TOBACCO NON-USER: CPT

## 2024-10-09 PROCEDURE — G8419 CALC BMI OUT NRM PARAM NOF/U: HCPCS

## 2024-10-09 PROCEDURE — 1123F ACP DISCUSS/DSCN MKR DOCD: CPT

## 2024-10-09 PROCEDURE — G8484 FLU IMMUNIZE NO ADMIN: HCPCS

## 2024-10-09 PROCEDURE — G8427 DOCREV CUR MEDS BY ELIG CLIN: HCPCS

## 2024-10-09 PROCEDURE — 1090F PRES/ABSN URINE INCON ASSESS: CPT

## 2024-10-09 PROCEDURE — 99214 OFFICE O/P EST MOD 30 MIN: CPT

## 2024-10-09 RX ORDER — ATENOLOL 25 MG/1
25 TABLET ORAL DAILY
Qty: 30 TABLET | Refills: 3 | Status: SHIPPED | OUTPATIENT
Start: 2024-10-09

## 2024-10-09 RX ORDER — AMLODIPINE BESYLATE 10 MG/1
5 TABLET ORAL DAILY
Qty: 30 TABLET | Refills: 1 | Status: SHIPPED | OUTPATIENT
Start: 2024-10-09

## 2024-10-24 NOTE — DISCHARGE SUMMARY
V2.0  Discharge Summary    Name:  Sheyla Byrd /Age/Sex: 1926 (89 y.o. female)   Admit Date: 2024  Discharge Date: 24    MRN & CSN:  [id number] & [id number] Encounter Date and Time 24 10:51 AM EDT    Attending:  Chris Fong MD Discharging Provider: Josef Naranjo DO       Hospital Course:     Brief HPI:   Sheyla Byrd is a 89 y.o. female who presents with hypertension, restrictive lung disease, CHF not on Lasix, hypothyroidism, presented with generalized weakness and bradycardia.  EMS arrived and found patient with heart rate of 22. In emergency department, patient was found to have BNP of 956 creatinine 1.4 and VBG showed pH of 7.384.  EKG in the emergency department showed bradycardia at 24 bpm.  Patient was given atropine injections and put on a isoproterenol drip. She was admitted to the inpatient service for further evaluation.       Brief Problem Based Course:   Symptomatic Bradycardia with Transient Heart Block  - EKG shortly after arrival with HR 20's (initial in the 80's)  - Echo showing normal LV function but with early signs of cardiac tamponade 2/2 to large pericardial effusion   - TSH Normal   - EP consulted. Their recommendations appreciated:    - Patient s/p Pacemaker on 7/10   - Continue Amolipidine 10 mg daily   - Continue ASA 81 mg daily   - Continue Atorvastatin 20 mg daily    - Device Check in 3 days after discharge    - Follow up in EP Clinic in 3 months    - Signed off      Acute Urinary Retention  - Likely 2/2 to recent procedure   - Multiple attempts of voiding trial unsuccessful. Will discharge to ARU on Cunningham. May consider adding Flomax however patient has allergy to Sulfa, so will hold for now. Attempt to discontinue Cunningham prior to discharge from ARU if able. Otherwise, may need outpatient Urology follow up.      Acute Delirium  - Resolved. PRN Melatonin if indicated      Hypertension  - Continue Amlodipine      Hyperlipidemia/CAD  -  Normal respiratory effort. Breath sounds clear and equal bilaterally.

## 2024-12-24 RX ORDER — ATENOLOL 25 MG/1
25 TABLET ORAL DAILY
Qty: 30 TABLET | Refills: 3 | OUTPATIENT
Start: 2024-12-24

## 2024-12-24 RX ORDER — ATENOLOL 25 MG/1
25 TABLET ORAL DAILY
Qty: 90 TABLET | Refills: 2 | Status: SHIPPED | OUTPATIENT
Start: 2024-12-24

## 2024-12-24 NOTE — TELEPHONE ENCOUNTER
Medication refill sent in separate encounter. Pt has been contact regarding refill and needing labs. Refusing medication refill

## 2025-04-18 NOTE — PROGRESS NOTES
[facility]   Electrophysiology Outpatient Note              Date:  April 21, 2025  Patient name: Sheyla Byrd  YOB: 1926    Primary Care physician: Cornelius Gerardo MD    HISTORY OF PRESENT ILLNESS: The patient is a 89 y.o.  female with a history of complete heart block, [employer] DC PPM, hypertension, hyperlipidemia, hypothyroidism    Patient follows with Dr. Hu in EP clinic.  Patient was admitted to [facility] 7/9/2024 for generalized weakness and fatigue.  Her heart rate was found to be in the 20s.  Patient also noted to have longstanding history of LBBB.  EP consulted.  Patient underwent Medtronic DC PPM implantation    10/9/2024 she is being seen for history of complete heart block and device management.  Device check reveals AP 1.1%,  99.8%.  1 NSVT events PAT with one-to-one AVC.  Lowered outputs keeping 2:1.  ECG shows SR with a HR of 76. Patient is accompanied by her daughter today.  She has lower extremity swelling.  Patient states she took some diuretics that was prescribed by her nephrologist for 3 days however the edema remains.  We will decrease her Norvasc to 5 mg as this can cause lower extremity edema and start atenolol that she was on prior to getting the device implanted.  If this does not help her lower extremity edema she will call her nephrologist regarding her diuretics    Today patient is being seen for history of complete heart block and device management.  Device check reveals AP 7.1%,  99.8% no events recorded.  ECG reveals sinus rhythm rate 68.  Patient is accompanied by her daughter today.  Today is her 89th birthday.  Patient denies chest pain, shortness of breath and palpitations.  Patient is frustrated due to frequent urination at night and unable to control her bladder.  Her daughter states she thinks she has a UTI and she is going to  medication for this.  Other than her urinary issues at night patient has been doing well and she

## 2025-04-21 ENCOUNTER — OFFICE VISIT (OUTPATIENT)
Dept: CARDIOLOGY CLINIC | Age: 89
End: 2025-04-21
Payer: MEDICARE

## 2025-04-21 ENCOUNTER — CLINICAL SUPPORT (OUTPATIENT)
Dept: CARDIOLOGY CLINIC | Age: 89
End: 2025-04-21

## 2025-04-21 VITALS
OXYGEN SATURATION: 97 % | BODY MASS INDEX: 24.74 KG/M2 | HEART RATE: 68 BPM | HEIGHT: 60 IN | SYSTOLIC BLOOD PRESSURE: 116 MMHG | DIASTOLIC BLOOD PRESSURE: 66 MMHG | WEIGHT: 126 LBS

## 2025-04-21 DIAGNOSIS — Z95.0 S/P PLACEMENT OF CARDIAC PACEMAKER: ICD-10-CM

## 2025-04-21 DIAGNOSIS — R00.1 BRADYCARDIA: ICD-10-CM

## 2025-04-21 DIAGNOSIS — R00.1 BRADYCARDIA: Primary | ICD-10-CM

## 2025-04-21 DIAGNOSIS — Z95.0 PACEMAKER: Primary | ICD-10-CM

## 2025-04-21 DIAGNOSIS — I10 PRIMARY HYPERTENSION: ICD-10-CM

## 2025-04-21 DIAGNOSIS — I44.2 COMPLETE HEART BLOCK (HCC): ICD-10-CM

## 2025-04-21 PROCEDURE — 93000 ELECTROCARDIOGRAM COMPLETE: CPT

## 2025-04-21 PROCEDURE — 99214 OFFICE O/P EST MOD 30 MIN: CPT

## 2025-04-21 PROCEDURE — 1160F RVW MEDS BY RX/DR IN RCRD: CPT

## 2025-04-21 PROCEDURE — 1159F MED LIST DOCD IN RCRD: CPT

## 2025-04-21 PROCEDURE — G2211 COMPLEX E/M VISIT ADD ON: HCPCS

## 2025-04-21 PROCEDURE — 1123F ACP DISCUSS/DSCN MKR DOCD: CPT

## 2025-04-21 PROCEDURE — G8427 DOCREV CUR MEDS BY ELIG CLIN: HCPCS

## 2025-04-21 PROCEDURE — 1090F PRES/ABSN URINE INCON ASSESS: CPT

## 2025-04-21 PROCEDURE — G8420 CALC BMI NORM PARAMETERS: HCPCS

## 2025-04-21 PROCEDURE — 1036F TOBACCO NON-USER: CPT

## 2025-04-21 NOTE — PATIENT INSTRUCTIONS
Continue Norvasc 5 mg daily  Continue atenolol 25 mg daily   Continue aspirin 81 mg daily  Continue Lipitor 20 mg daily  Continue with every 3-month remote device checks    Follow up in 6 months Jenna GRANADOS

## (undated) DEVICE — LINER,SOFT,SUCTION CANISTER,1500CC: Brand: MEDLINE

## (undated) DEVICE — CONMED SCOPE SAVER BITE BLOCK, 20X27 MM: Brand: SCOPE SAVER

## (undated) DEVICE — MEDTRONIC HIS SHEATH

## (undated) DEVICE — MERITMEDICAL 7FR PRELUDE SNAP PEEL-AWAY

## (undated) DEVICE — SINGLE USE BIOPSY VALVE MAJ-210: Brand: SINGLE USE BIOPSY VALVE (STERILE)

## (undated) DEVICE — SYRINGE MED 50ML LUERSLIP TIP

## (undated) DEVICE — CANNULA,OXY,ADULT,SUPERSOFT,W/7'TUB,SC: Brand: MEDLINE INDUSTRIES, INC.

## (undated) DEVICE — IMMOBILIZER SHOULDER SLING SWATHE DLX

## (undated) DEVICE — ELECTRODE,RADIOTRANSLUCENT,FOAM,3PK: Brand: MEDLINE

## (undated) DEVICE — SYRINGE MED 10ML SLIP TIP BLNT FILL AND LUERLOCK DISP

## (undated) DEVICE — BOWL MED M 16OZ PLAS CAP GRAD

## (undated) DEVICE — TRAP,MUCUS SPECIMEN, 80CC: Brand: MEDLINE

## (undated) DEVICE — CABLE PACE LNG L12IN CHN A OR V SM CLP EPG TEMP DISP

## (undated) DEVICE — SINGLE USE SUCTION VALVE MAJ-209: Brand: SINGLE USE SUCTION VALVE (STERILE)

## (undated) DEVICE — 150CM STANDARD JWIRE

## (undated) DEVICE — YANKAUER,BULB TIP,W/O VENT,RIGID,STERILE: Brand: MEDLINE

## (undated) DEVICE — TUBING, SUCTION, 3/16" X 6', STRAIGHT: Brand: MEDLINE

## (undated) DEVICE — SUTURE PERMAHAND SZ 2-0 L18IN NONABSORBABLE BLK L26MM SH C012D

## (undated) DEVICE — TUBING, SUCTION, 3/16" X 12', STRAIGHT: Brand: MEDLINE

## (undated) DEVICE — PACEMAKER PACK: Brand: MEDLINE INDUSTRIES, INC.